# Patient Record
Sex: FEMALE | Race: WHITE | Employment: PART TIME | URBAN - METROPOLITAN AREA
[De-identification: names, ages, dates, MRNs, and addresses within clinical notes are randomized per-mention and may not be internally consistent; named-entity substitution may affect disease eponyms.]

---

## 2017-04-14 ENCOUNTER — HOSPITAL ENCOUNTER (EMERGENCY)
Age: 22
Discharge: HOME | End: 2017-04-14
Attending: EMERGENCY MEDICINE
Payer: COMMERCIAL

## 2017-04-14 VITALS
HEART RATE: 80 BPM | RESPIRATION RATE: 18 BRPM | OXYGEN SATURATION: 100 % | HEIGHT: 58 IN | WEIGHT: 85 LBS | TEMPERATURE: 97.9 F | SYSTOLIC BLOOD PRESSURE: 94 MMHG | BODY MASS INDEX: 17.84 KG/M2 | DIASTOLIC BLOOD PRESSURE: 55 MMHG

## 2017-04-14 DIAGNOSIS — K52.9 GASTROENTERITIS: Primary | ICD-10-CM

## 2017-04-14 LAB
ALBUMIN SERPL-MCNC: 5.1 G/DL (ref 3.9–4.9)
ALP BLD-CCNC: 60 U/L (ref 40–130)
ALT SERPL-CCNC: 34 U/L (ref 0–33)
AMPHETAMINE SCREEN, URINE: ABNORMAL
ANION GAP SERPL CALCULATED.3IONS-SCNC: 25 MEQ/L (ref 7–13)
AST SERPL-CCNC: 42 U/L (ref 0–35)
BACTERIA: ABNORMAL /HPF
BARBITURATE SCREEN URINE: ABNORMAL
BASOPHILS ABSOLUTE: 0.1 K/UL (ref 0–0.2)
BASOPHILS RELATIVE PERCENT: 0.6 %
BENZODIAZEPINE SCREEN, URINE: ABNORMAL
BILIRUB SERPL-MCNC: 0.6 MG/DL (ref 0–1.2)
BILIRUBIN URINE: NEGATIVE
BLOOD, URINE: ABNORMAL
BUN BLDV-MCNC: 20 MG/DL (ref 6–20)
CALCIUM SERPL-MCNC: 10.1 MG/DL (ref 8.6–10.2)
CANNABINOID SCREEN URINE: POSITIVE
CHLORIDE BLD-SCNC: 99 MEQ/L (ref 98–107)
CLARITY: CLEAR
CO2: 11 MEQ/L (ref 22–29)
COCAINE METABOLITE SCREEN URINE: ABNORMAL
COLOR: YELLOW
CREAT SERPL-MCNC: 0.68 MG/DL (ref 0.5–0.9)
EOSINOPHILS ABSOLUTE: 0.1 K/UL (ref 0–0.7)
EOSINOPHILS RELATIVE PERCENT: 0.4 %
EPITHELIAL CELLS, UA: ABNORMAL /HPF
GFR AFRICAN AMERICAN: >60
GFR NON-AFRICAN AMERICAN: >60
GLOBULIN: 3 G/DL (ref 2.3–3.5)
GLUCOSE BLD-MCNC: 64 MG/DL (ref 74–109)
GLUCOSE URINE: NEGATIVE MG/DL
HCT VFR BLD CALC: 43.9 % (ref 37–47)
HEMOGLOBIN: 14.7 G/DL (ref 12–16)
KETONES, URINE: >=80 MG/DL
LEUKOCYTE ESTERASE, URINE: NEGATIVE
LYMPHOCYTES ABSOLUTE: 3.1 K/UL (ref 1–4.8)
LYMPHOCYTES RELATIVE PERCENT: 22.9 %
Lab: ABNORMAL
MCH RBC QN AUTO: 30.5 PG (ref 27–31.3)
MCHC RBC AUTO-ENTMCNC: 33.5 % (ref 33–37)
MCV RBC AUTO: 91.1 FL (ref 82–100)
MONOCYTES ABSOLUTE: 0.6 K/UL (ref 0.2–0.8)
MONOCYTES RELATIVE PERCENT: 4.4 %
NEUTROPHILS ABSOLUTE: 9.7 K/UL (ref 1.4–6.5)
NEUTROPHILS RELATIVE PERCENT: 71.7 %
NITRITE, URINE: NEGATIVE
OPIATE SCREEN URINE: ABNORMAL
PDW BLD-RTO: 12.6 % (ref 11.5–14.5)
PH UA: 5.5 (ref 5–9)
PHENCYCLIDINE SCREEN URINE: ABNORMAL
PLATELET # BLD: 177 K/UL (ref 130–400)
POTASSIUM SERPL-SCNC: 4.2 MEQ/L (ref 3.5–5.1)
PROTEIN UA: 30 MG/DL
RBC # BLD: 4.82 M/UL (ref 4.2–5.4)
RBC UA: ABNORMAL /HPF (ref 0–2)
SLIDE REVIEW: ABNORMAL
SODIUM BLD-SCNC: 135 MEQ/L (ref 132–144)
SPECIFIC GRAVITY UA: 1.03 (ref 1–1.03)
TOTAL PROTEIN: 8.1 G/DL (ref 6.4–8.1)
URINE REFLEX TO CULTURE: YES
UROBILINOGEN, URINE: 0.2 E.U./DL
WBC # BLD: 13.5 K/UL (ref 4.5–11)
WBC UA: ABNORMAL /HPF (ref 0–5)

## 2017-04-14 PROCEDURE — 99284 EMERGENCY DEPT VISIT MOD MDM: CPT

## 2017-04-14 PROCEDURE — 6370000000 HC RX 637 (ALT 250 FOR IP): Performed by: EMERGENCY MEDICINE

## 2017-04-14 PROCEDURE — 96374 THER/PROPH/DIAG INJ IV PUSH: CPT

## 2017-04-14 PROCEDURE — 2580000003 HC RX 258: Performed by: EMERGENCY MEDICINE

## 2017-04-14 PROCEDURE — 36415 COLL VENOUS BLD VENIPUNCTURE: CPT

## 2017-04-14 PROCEDURE — 6360000002 HC RX W HCPCS: Performed by: EMERGENCY MEDICINE

## 2017-04-14 PROCEDURE — 81001 URINALYSIS AUTO W/SCOPE: CPT

## 2017-04-14 PROCEDURE — 87086 URINE CULTURE/COLONY COUNT: CPT

## 2017-04-14 PROCEDURE — 80053 COMPREHEN METABOLIC PANEL: CPT

## 2017-04-14 PROCEDURE — 96375 TX/PRO/DX INJ NEW DRUG ADDON: CPT

## 2017-04-14 PROCEDURE — 80307 DRUG TEST PRSMV CHEM ANLYZR: CPT

## 2017-04-14 PROCEDURE — 85025 COMPLETE CBC W/AUTO DIFF WBC: CPT

## 2017-04-14 PROCEDURE — 96372 THER/PROPH/DIAG INJ SC/IM: CPT

## 2017-04-14 RX ORDER — ONDANSETRON 2 MG/ML
4 INJECTION INTRAMUSCULAR; INTRAVENOUS ONCE
Status: COMPLETED | OUTPATIENT
Start: 2017-04-14 | End: 2017-04-14

## 2017-04-14 RX ORDER — 0.9 % SODIUM CHLORIDE 0.9 %
2000 INTRAVENOUS SOLUTION INTRAVENOUS ONCE
Status: COMPLETED | OUTPATIENT
Start: 2017-04-14 | End: 2017-04-14

## 2017-04-14 RX ORDER — DIPHENOXYLATE HYDROCHLORIDE AND ATROPINE SULFATE 2.5; .025 MG/1; MG/1
2 TABLET ORAL ONCE
Status: COMPLETED | OUTPATIENT
Start: 2017-04-14 | End: 2017-04-14

## 2017-04-14 RX ORDER — DIPHENOXYLATE HYDROCHLORIDE AND ATROPINE SULFATE 2.5; .025 MG/1; MG/1
1 TABLET ORAL 4 TIMES DAILY PRN
Qty: 20 TABLET | Refills: 0 | Status: SHIPPED | OUTPATIENT
Start: 2017-04-14 | End: 2017-04-24

## 2017-04-14 RX ORDER — DICYCLOMINE HYDROCHLORIDE 10 MG/ML
20 INJECTION INTRAMUSCULAR ONCE
Status: COMPLETED | OUTPATIENT
Start: 2017-04-14 | End: 2017-04-14

## 2017-04-14 RX ORDER — DEXTROSE MONOHYDRATE 25 G/50ML
25 INJECTION, SOLUTION INTRAVENOUS ONCE
Status: COMPLETED | OUTPATIENT
Start: 2017-04-14 | End: 2017-04-14

## 2017-04-14 RX ORDER — ONDANSETRON 4 MG/1
4 TABLET, FILM COATED ORAL EVERY 8 HOURS PRN
Qty: 20 TABLET | Refills: 0 | Status: SHIPPED | OUTPATIENT
Start: 2017-04-14 | End: 2017-05-05

## 2017-04-14 RX ADMIN — ONDANSETRON 4 MG: 2 INJECTION, SOLUTION INTRAMUSCULAR; INTRAVENOUS at 10:50

## 2017-04-14 RX ADMIN — DIPHENOXYLATE HYDROCHLORIDE AND ATROPINE SULFATE 2 TABLET: 2.5; .025 TABLET ORAL at 11:18

## 2017-04-14 RX ADMIN — DICYCLOMINE HYDROCHLORIDE 20 MG: 20 INJECTION, SOLUTION INTRAMUSCULAR at 10:48

## 2017-04-14 RX ADMIN — DEXTROSE MONOHYDRATE 25 G: 500 INJECTION PARENTERAL at 12:51

## 2017-04-14 RX ADMIN — SODIUM CHLORIDE 2000 ML: 900 INJECTION, SOLUTION INTRAVENOUS at 10:50

## 2017-04-14 ASSESSMENT — ENCOUNTER SYMPTOMS
EYE REDNESS: 0
NAUSEA: 1
CONSTIPATION: 0
FACIAL SWELLING: 0
STRIDOR: 0
SHORTNESS OF BREATH: 0
BACK PAIN: 0
ANAL BLEEDING: 0
EYE ITCHING: 0
EYE PAIN: 0
COLOR CHANGE: 0
CHOKING: 0
ABDOMINAL DISTENTION: 0
TROUBLE SWALLOWING: 0
RHINORRHEA: 0
SORE THROAT: 0
CHEST TIGHTNESS: 0
ABDOMINAL PAIN: 0
PHOTOPHOBIA: 0
DIARRHEA: 1
VOMITING: 1
EYE DISCHARGE: 0
WHEEZING: 0
COUGH: 0
VOICE CHANGE: 0
BLOOD IN STOOL: 0
SINUS PRESSURE: 0

## 2017-04-15 LAB — URINE CULTURE, ROUTINE: NORMAL

## 2017-05-05 ENCOUNTER — OFFICE VISIT (OUTPATIENT)
Dept: PODIATRY | Age: 22
End: 2017-05-05

## 2017-05-05 VITALS
TEMPERATURE: 98.2 F | DIASTOLIC BLOOD PRESSURE: 70 MMHG | OXYGEN SATURATION: 99 % | HEART RATE: 71 BPM | HEIGHT: 58 IN | WEIGHT: 83.4 LBS | SYSTOLIC BLOOD PRESSURE: 106 MMHG | BODY MASS INDEX: 17.51 KG/M2

## 2017-05-05 DIAGNOSIS — L03.039 ONYCHIA OF TOE, UNSPECIFIED LATERALITY: Primary | ICD-10-CM

## 2017-05-05 PROCEDURE — 99213 OFFICE O/P EST LOW 20 MIN: CPT | Performed by: PODIATRIST

## 2021-07-21 ENCOUNTER — HOSPITAL ENCOUNTER (INPATIENT)
Age: 26
LOS: 5 days | Discharge: HOME | DRG: 885 | End: 2021-07-26
Attending: PSYCHIATRY & NEUROLOGY | Admitting: PSYCHIATRY & NEUROLOGY
Payer: COMMERCIAL

## 2021-07-21 DIAGNOSIS — F33.2 SEVERE EPISODE OF RECURRENT MAJOR DEPRESSIVE DISORDER, WITHOUT PSYCHOTIC FEATURES (HCC): Primary | ICD-10-CM

## 2021-07-21 PROBLEM — F33.9 MAJOR DEPRESSION, RECURRENT (HCC): Status: ACTIVE | Noted: 2021-07-21

## 2021-07-21 LAB
ACETAMINOPHEN LEVEL: <5 UG/ML (ref 10–30)
ALBUMIN SERPL-MCNC: 4.6 G/DL (ref 3.5–4.6)
ALP BLD-CCNC: 59 U/L (ref 40–130)
ALT SERPL-CCNC: 11 U/L (ref 0–33)
AMPHETAMINE SCREEN, URINE: ABNORMAL
ANION GAP SERPL CALCULATED.3IONS-SCNC: 13 MEQ/L (ref 9–15)
AST SERPL-CCNC: 14 U/L (ref 0–35)
BACTERIA: NEGATIVE /HPF
BARBITURATE SCREEN URINE: ABNORMAL
BASOPHILS ABSOLUTE: 0 K/UL (ref 0–0.2)
BASOPHILS RELATIVE PERCENT: 0.5 %
BENZODIAZEPINE SCREEN, URINE: ABNORMAL
BILIRUB SERPL-MCNC: 0.3 MG/DL (ref 0.2–0.7)
BILIRUBIN URINE: NEGATIVE
BLOOD, URINE: NEGATIVE
BUN BLDV-MCNC: 9 MG/DL (ref 6–20)
CALCIUM SERPL-MCNC: 9.3 MG/DL (ref 8.5–9.9)
CANNABINOID SCREEN URINE: POSITIVE
CHLORIDE BLD-SCNC: 105 MEQ/L (ref 95–107)
CHOLESTEROL, TOTAL: 149 MG/DL (ref 0–199)
CLARITY: CLEAR
CO2: 22 MEQ/L (ref 20–31)
COCAINE METABOLITE SCREEN URINE: ABNORMAL
COLOR: YELLOW
CREAT SERPL-MCNC: 0.47 MG/DL (ref 0.5–0.9)
EOSINOPHILS ABSOLUTE: 0 K/UL (ref 0–0.7)
EOSINOPHILS RELATIVE PERCENT: 0.1 %
EPITHELIAL CELLS, UA: ABNORMAL /HPF (ref 0–5)
ETHANOL PERCENT: NORMAL G/DL
ETHANOL: <10 MG/DL (ref 0–0.08)
GFR AFRICAN AMERICAN: >60
GFR NON-AFRICAN AMERICAN: >60
GLOBULIN: 2.4 G/DL (ref 2.3–3.5)
GLUCOSE BLD-MCNC: 109 MG/DL (ref 70–99)
GLUCOSE URINE: NEGATIVE MG/DL
HCG(URINE) PREGNANCY TEST: NEGATIVE
HCT VFR BLD CALC: 39 % (ref 37–47)
HDLC SERPL-MCNC: 61 MG/DL (ref 40–59)
HEMOGLOBIN: 12.8 G/DL (ref 12–16)
HYALINE CASTS: ABNORMAL /HPF (ref 0–5)
KETONES, URINE: NEGATIVE MG/DL
LDL CHOLESTEROL CALCULATED: 81 MG/DL (ref 0–129)
LEUKOCYTE ESTERASE, URINE: ABNORMAL
LYMPHOCYTES ABSOLUTE: 1.3 K/UL (ref 1–4.8)
LYMPHOCYTES RELATIVE PERCENT: 17.9 %
Lab: ABNORMAL
MCH RBC QN AUTO: 30.5 PG (ref 27–31.3)
MCHC RBC AUTO-ENTMCNC: 32.9 % (ref 33–37)
MCV RBC AUTO: 92.5 FL (ref 82–100)
METHADONE SCREEN, URINE: ABNORMAL
MONOCYTES ABSOLUTE: 0.5 K/UL (ref 0.2–0.8)
MONOCYTES RELATIVE PERCENT: 6.4 %
NEUTROPHILS ABSOLUTE: 5.5 K/UL (ref 1.4–6.5)
NEUTROPHILS RELATIVE PERCENT: 75.1 %
NITRITE, URINE: NEGATIVE
OPIATE SCREEN URINE: ABNORMAL
OXYCODONE URINE: ABNORMAL
PDW BLD-RTO: 13.3 % (ref 11.5–14.5)
PH UA: 5.5 (ref 5–9)
PHENCYCLIDINE SCREEN URINE: ABNORMAL
PLATELET # BLD: 244 K/UL (ref 130–400)
POTASSIUM SERPL-SCNC: 4.5 MEQ/L (ref 3.4–4.9)
PROPOXYPHENE SCREEN: ABNORMAL
PROTEIN UA: NEGATIVE MG/DL
RBC # BLD: 4.22 M/UL (ref 4.2–5.4)
RBC UA: ABNORMAL /HPF (ref 0–5)
SALICYLATE, SERUM: <0.3 MG/DL (ref 15–30)
SARS-COV-2, NAAT: NOT DETECTED
SODIUM BLD-SCNC: 140 MEQ/L (ref 135–144)
SPECIFIC GRAVITY UA: 1.02 (ref 1–1.03)
TOTAL CK: 48 U/L (ref 0–170)
TOTAL PROTEIN: 7 G/DL (ref 6.3–8)
TRIGL SERPL-MCNC: 35 MG/DL (ref 0–150)
TSH SERPL DL<=0.05 MIU/L-ACNC: 1.17 UIU/ML (ref 0.44–3.86)
URINE REFLEX TO CULTURE: YES
UROBILINOGEN, URINE: 0.2 E.U./DL
WBC # BLD: 7.3 K/UL (ref 4.8–10.8)
WBC UA: ABNORMAL /HPF (ref 0–5)

## 2021-07-21 PROCEDURE — 81001 URINALYSIS AUTO W/SCOPE: CPT

## 2021-07-21 PROCEDURE — 80307 DRUG TEST PRSMV CHEM ANLYZR: CPT

## 2021-07-21 PROCEDURE — 80053 COMPREHEN METABOLIC PANEL: CPT

## 2021-07-21 PROCEDURE — 84703 CHORIONIC GONADOTROPIN ASSAY: CPT

## 2021-07-21 PROCEDURE — 87086 URINE CULTURE/COLONY COUNT: CPT

## 2021-07-21 PROCEDURE — 80143 DRUG ASSAY ACETAMINOPHEN: CPT

## 2021-07-21 PROCEDURE — 82550 ASSAY OF CK (CPK): CPT

## 2021-07-21 PROCEDURE — 80179 DRUG ASSAY SALICYLATE: CPT

## 2021-07-21 PROCEDURE — 36415 COLL VENOUS BLD VENIPUNCTURE: CPT

## 2021-07-21 PROCEDURE — 85025 COMPLETE CBC W/AUTO DIFF WBC: CPT

## 2021-07-21 PROCEDURE — 82077 ASSAY SPEC XCP UR&BREATH IA: CPT

## 2021-07-21 PROCEDURE — 93005 ELECTROCARDIOGRAM TRACING: CPT | Performed by: PSYCHIATRY & NEUROLOGY

## 2021-07-21 PROCEDURE — 80061 LIPID PANEL: CPT

## 2021-07-21 PROCEDURE — 99284 EMERGENCY DEPT VISIT MOD MDM: CPT

## 2021-07-21 PROCEDURE — 84443 ASSAY THYROID STIM HORMONE: CPT

## 2021-07-21 PROCEDURE — 1240000000 HC EMOTIONAL WELLNESS R&B

## 2021-07-21 PROCEDURE — 87635 SARS-COV-2 COVID-19 AMP PRB: CPT

## 2021-07-21 RX ORDER — HALOPERIDOL 5 MG
5 TABLET ORAL EVERY 6 HOURS PRN
Status: DISCONTINUED | OUTPATIENT
Start: 2021-07-21 | End: 2021-07-26 | Stop reason: HOSPADM

## 2021-07-21 RX ORDER — TRAZODONE HYDROCHLORIDE 100 MG/1
100 TABLET ORAL NIGHTLY
Status: ON HOLD | COMMUNITY
End: 2021-07-26 | Stop reason: HOSPADM

## 2021-07-21 RX ORDER — HYDROXYZINE HYDROCHLORIDE 50 MG/ML
50 INJECTION, SOLUTION INTRAMUSCULAR EVERY 6 HOURS PRN
Status: DISCONTINUED | OUTPATIENT
Start: 2021-07-21 | End: 2021-07-26 | Stop reason: HOSPADM

## 2021-07-21 RX ORDER — TRAZODONE HYDROCHLORIDE 50 MG/1
50 TABLET ORAL NIGHTLY PRN
Status: DISCONTINUED | OUTPATIENT
Start: 2021-07-22 | End: 2021-07-26 | Stop reason: HOSPADM

## 2021-07-21 RX ORDER — HALOPERIDOL 5 MG/ML
5 INJECTION INTRAMUSCULAR EVERY 6 HOURS PRN
Status: DISCONTINUED | OUTPATIENT
Start: 2021-07-21 | End: 2021-07-26 | Stop reason: HOSPADM

## 2021-07-21 RX ORDER — GABAPENTIN 100 MG/1
200 CAPSULE ORAL 2 TIMES DAILY
Status: ON HOLD | COMMUNITY
End: 2021-07-26 | Stop reason: HOSPADM

## 2021-07-21 RX ORDER — BENZTROPINE MESYLATE 1 MG/ML
2 INJECTION INTRAMUSCULAR; INTRAVENOUS 2 TIMES DAILY PRN
Status: DISCONTINUED | OUTPATIENT
Start: 2021-07-21 | End: 2021-07-26 | Stop reason: HOSPADM

## 2021-07-21 RX ORDER — ACETAMINOPHEN 325 MG/1
650 TABLET ORAL EVERY 4 HOURS PRN
Status: DISCONTINUED | OUTPATIENT
Start: 2021-07-21 | End: 2021-07-26 | Stop reason: HOSPADM

## 2021-07-21 RX ORDER — MAGNESIUM HYDROXIDE/ALUMINUM HYDROXICE/SIMETHICONE 120; 1200; 1200 MG/30ML; MG/30ML; MG/30ML
30 SUSPENSION ORAL PRN
Status: DISCONTINUED | OUTPATIENT
Start: 2021-07-21 | End: 2021-07-26 | Stop reason: HOSPADM

## 2021-07-21 RX ORDER — HYDROXYZINE PAMOATE 50 MG/1
50 CAPSULE ORAL EVERY 6 HOURS PRN
Status: DISCONTINUED | OUTPATIENT
Start: 2021-07-21 | End: 2021-07-26 | Stop reason: HOSPADM

## 2021-07-21 ASSESSMENT — SLEEP AND FATIGUE QUESTIONNAIRES
DIFFICULTY STAYING ASLEEP: YES
DIFFICULTY FALLING ASLEEP: YES
RESTFUL SLEEP: NO
DO YOU USE A SLEEP AID: NO
AVERAGE NUMBER OF SLEEP HOURS: 4
DO YOU HAVE DIFFICULTY SLEEPING: YES
SLEEP PATTERN: DIFFICULTY FALLING ASLEEP
DIFFICULTY ARISING: YES

## 2021-07-21 ASSESSMENT — PATIENT HEALTH QUESTIONNAIRE - PHQ9: SUM OF ALL RESPONSES TO PHQ QUESTIONS 1-9: 9

## 2021-07-21 ASSESSMENT — ENCOUNTER SYMPTOMS
TROUBLE SWALLOWING: 0
ALLERGIC/IMMUNOLOGIC NEGATIVE: 1
EYE PAIN: 0
ABDOMINAL PAIN: 0
COLOR CHANGE: 0
APNEA: 0
SHORTNESS OF BREATH: 0

## 2021-07-21 NOTE — ED NOTES
Provisional Diagnosis:Bipolar disorder    psychosocial and Contextual Factors:  Pt lives alone. She reports that her  has been unfatihtful to her and she wants to end there marriage. She reports that she feels abandoned by a friend who told her mother some information about her personal life in confidence and now feels betrayed. Pt is not currently employed and lives with  and 9year old son. C-SSRS Summary:     Patient: C-SSRS Suicide Screening  1) Within the past month, have you wished you were dead or wished you could go to sleep and not wake up? : Yes  2) Have you actually had any thoughts of killing yourself? : Yes  3) Have you been thinking about how you might kill yourself? : No  4) Have you had these thoughts and had some intention of acting on them? : No (\" I don't think so\")  5) Have you started to work out or worked out the details of how to kill yourself? Do you intend to carry out this plan? : No  6) Have you ever done anything, started to do anything, or prepared to do anything to end your life?: Yes  Did this occur within the past 3 months? : No (May [address]21 was going to hang self, 2013 tried to [facility] on medication )    Family: amily: This writer spoke with patient sister Dae Mtz [phone]. She reports that she is aware her sister has been feeling ' stressed\" but she does not feel she is a danger to self however this writer after found that the pateitnts sister Dae Mtz is only 16 and not aware of full situation    Agency: [facility] , Pt has also  [facility] and this writer received a call from her [facility] states that the patient has been making multiple suicidal statements to her  over the last week.  That when  woke up this morning that she was gone and that when she was found found [place] she has threatened to jump  in the past. Quita Martell states she feels she is a strong risk for suicide and

## 2021-07-21 NOTE — ED NOTES
Pt asked for paper and pencil and is using the phone in the [address] [place] [address] to call family, she is quiet and cooperative with no problems and no C/O any kind expressed.      Toni Corea RN  07/21/21 7764

## 2021-07-21 NOTE — ED NOTES
Janet De Paz PA-C is here assessing the pt medically.   Called lab talked with Beena Martinez in the lab who states he will send lab to the [facility] to draw the pt's blood work     Dandre Marques RN  07/21/21 2398

## 2021-07-21 NOTE — ED NOTES
Pt is quiet and cooperative awake in the -ER and has no problems and no C/O any kind expressed.      Efrain Ang RN  07/21/21 2669

## 2021-07-21 NOTE — ED PROVIDER NOTES
[address] [facility] ED  EMERGENCY DEPARTMENT ENCOUNTER      Pt Name: Анна Correia  MRN: [id number]  [place] 1995  Date of evaluation: 7/21/2021  Provider: Quincy Cordova PA-C    CHIEF COMPLAINT       Chief Complaint   Patient presents with    Suicidal         HISTORY OF PRESENT ILLNESS   (Location/Symptom, Timing/Onset, Context/Setting, Quality, Duration, Modifying Factors, Severity)  Note limiting factors. Анна Correia is a 26 y.o. female who presents to the emergency department with complaints of depression with indirect suicidal thoughts. Patient states \"not technically\" when asked, but states \"I just wanted all to and\". Patient denies any homicidal ideation, auditory visual hallucinations. Patient states generalized fatigue but denies any recent illness or injury otherwise  HPI    Nursing Notes were reviewed. REVIEW OF SYSTEMS    (2-9 systems for level 4, 10 or more for level 5)     Review of Systems   Constitutional: Negative for diaphoresis and fever. HENT: Negative for hearing loss and trouble swallowing. Eyes: Negative for pain. Respiratory: Negative for apnea and shortness of breath. Cardiovascular: Negative for chest pain. Gastrointestinal: Negative for abdominal pain. Endocrine: Negative. Genitourinary: Negative for hematuria. Musculoskeletal: Negative for neck pain and neck stiffness. Skin: Negative for color change. Allergic/Immunologic: Negative. Neurological: Negative for dizziness and numbness. Hematological: Negative. Psychiatric/Behavioral: Negative. All other systems reviewed and are negative. Except as noted above the remainder of the review of systems was reviewed and negative. PAST MEDICAL HISTORY   No past medical history on file. SURGICAL HISTORY     No past surgical history on file. CURRENT MEDICATIONS       Previous Medications    GABAPENTIN (NEURONTIN) 100 MG CAPSULE    Take 200 mg by mouth 2 times daily.     TRAZODONE (DESYREL) 100 MG TABLET    Take 100 mg by mouth nightly    VORTIOXETINE (TRINTELLIX) 5 MG TABLET    Take 5 mg by mouth daily       ALLERGIES     Adhesive tape    FAMILY HISTORY     No family history on file. SOCIAL HISTORY       Social History     Socioeconomic History    Marital status: Single     Spouse name: Not on file    Number of children: Not on file    Years of education: Not on file    Highest education level: Not on file   Occupational History    Not on file   Tobacco Use    Smoking status: Not on file   Substance and Sexual Activity    Alcohol use: Not on file    Drug use: Not on file    Sexual activity: Not on file   Other Topics Concern    Not on file   Social History Narrative    Not on file     Social Determinants of Health     Financial Resource Strain:     Difficulty of Paying Living Expenses:    Food Insecurity:     Worried About Running Out of Food in the Last Year:     [address] in the Last Year:    Transportation Needs:     Lack of Transportation (Medical):  Lack of Transportation (Non-Medical):    Physical Activity:     Days of Exercise per Week:     Minutes of Exercise per Session:    Stress:     Feeling of Stress :    Social Connections:     Frequency of Communication with Friends and Family:     Frequency of Social Gatherings with Friends and Family:     Attends Islam Services:     Active Member of Clubs or Organizations:     Attends Club or Organization Meetings:     Marital Status:    Intimate Partner Violence:     Fear of Current or Ex-Partner:     Emotionally Abused:     Physically Abused:     Sexually Abused:        SCREENINGS                        PHYSICAL EXAM    (up to 7 for level 4, 8 or more for level 5)     ED Triage Vitals [07/21/21 1446]   BP Temp Temp Source Pulse Resp SpO2 Height Weight   114/77 97 °F (36.1 °C) Temporal 88 18 99 % 4' 10\" (1.473 m) 90 lb (40.8 kg)       Physical Exam  Vitals and nursing note reviewed. Constitutional:       General: She is not in acute distress. Appearance: She is well-developed. She is not diaphoretic. HENT:      Head: Normocephalic and atraumatic. Mouth/Throat:      Pharynx: No oropharyngeal exudate. Eyes:      General: No scleral icterus. Conjunctiva/sclera: Conjunctivae normal.      Pupils: Pupils are equal, round, and reactive to light. Neck:      Trachea: No tracheal deviation. Cardiovascular:      Rate and Rhythm: Normal rate. Heart sounds: Normal heart sounds. Pulmonary:      Effort: Pulmonary effort is normal. No respiratory distress. Breath sounds: Normal breath sounds. Abdominal:      General: Bowel sounds are normal. There is no distension. Palpations: Abdomen is soft. Musculoskeletal:         General: Normal range of motion. Cervical back: Normal range of motion and neck supple. Skin:     General: Skin is warm and dry. Findings: No erythema or rash. Neurological:      Mental Status: She is alert and oriented to person, place, and time. Cranial Nerves: No cranial nerve deficit. Motor: No abnormal muscle tone. Psychiatric:         Mood and Affect: Mood is depressed. Behavior: Behavior is withdrawn.          DIAGNOSTIC RESULTS     EKG: All EKG's are interpreted by the Emergency Department Physician who either signs or Co-signs this chart in the absence of a cardiologist.    Normal sinus rhythm, rate 81 bpm, no acute ST elevation    RADIOLOGY:   Non-plain film images such as CT, Ultrasound and MRI are read by the radiologist. Plain radiographic images are visualized and preliminarily interpreted by the emergency physician with the below findings:        Interpretation per the Radiologist below, if available at the time of this note:    No orders to display         ED BEDSIDE ULTRASOUND:   Performed by ED Physician - none    LABS:  Labs Reviewed   CBC WITH AUTO DIFFERENTIAL - Abnormal; Notable for the following components:       Result Value    MCHC 32.9 (*)     All other components within normal limits   COMPREHENSIVE METABOLIC PANEL - Abnormal; Notable for the following components:    Glucose 109 (*)     CREATININE 0.47 (*)     All other components within normal limits   URINE DRUG SCREEN - Abnormal; Notable for the following components:    Cannabinoid Scrn, Ur POSITIVE (*)     All other components within normal limits   ACETAMINOPHEN LEVEL - Abnormal; Notable for the following components:    Acetaminophen Level <5 (*)     All other components within normal limits   LIPID PANEL - Abnormal; Notable for the following components:    HDL 61 (*)     All other components within normal limits   SALICYLATE LEVEL - Abnormal; Notable for the following components:    Salicylate, Serum <6.5 (*)     All other components within normal limits   URINE RT REFLEX TO CULTURE - Abnormal; Notable for the following components:    Leukocyte Esterase, Urine MODERATE (*)     All other components within normal limits   MICROSCOPIC URINALYSIS - Abnormal; Notable for the following components:    WBC, UA 20-50 (*)     RBC, UA 3-5 (*)     All other components within normal limits   COVID-19, RAPID   CULTURE, URINE   ETHANOL   CK   TSH WITHOUT REFLEX   PREGNANCY, URINE       All other labs were within normal range or not returned as of this dictation. EMERGENCY DEPARTMENT COURSE and DIFFERENTIAL DIAGNOSIS/MDM:   Vitals:    Vitals:    07/21/21 1446   BP: 114/77   Pulse: 88   Resp: 18   Temp: 97 °F (36.1 °C)   TempSrc: Temporal   SpO2: 99%   Weight: 90 lb (40.8 kg)   Height: 4' 10\" (1.473 m)           MDM      REASSESSMENT      Patient is medically cleared for psychiatric evaluation and care    CONSULTS:  IP CONSULT TO HOSPITALIST  IP CONSULT TO SOCIAL WORK    PROCEDURES:  Unless otherwise noted below, none     Procedures    She was seen, evaluated and admitted by psychiatry    FINAL IMPRESSION      1.  Severe episode of recurrent major depressive disorder, without psychotic features ([facility] [address]          DISPOSITION/PLAN   DISPOSITION        PATIENT REFERRED TO:  No follow-up provider specified. DISCHARGE MEDICATIONS:  New Prescriptions    No medications on file     Controlled Substances Monitoring:     No flowsheet data found.     (Please note that portions of this note were completed with a voice recognition program.  Efforts were made to edit the dictations but occasionally words are mis-transcribed.)    Shaye Delarosa PA-C (electronically signed)  Attending Emergency Physician            Shaye Delarosa PA-C  07/21/21 2041

## 2021-07-21 NOTE — ED NOTES
Re contacted [facility]. Judith is sending med list. She states her last fefill  would have been in April and that her appearances for appointments have been sporadic . Pateints prescriber Rayna Rizo CNP. [facility]  07/21/21 8143

## 2021-07-21 NOTE — ED NOTES
Skin and clothing  check were completed. Pts belongings were cataloged by security and secured in locker. Lindy Brumfield  [place], [address] [place] [address]  07/21/21 4810

## 2021-07-21 NOTE — ED NOTES
Lab is here to draw the pt's blood work.   - staff are present in the [address] the      Leslie Ivan RN  07/21/21 8804

## 2021-07-22 LAB
EKG ATRIAL RATE: 81 BPM
EKG P AXIS: 51 DEGREES
EKG P-R INTERVAL: 130 MS
EKG Q-T INTERVAL: 352 MS
EKG QRS DURATION: 86 MS
EKG QTC CALCULATION (BAZETT): 408 MS
EKG R AXIS: 76 DEGREES
EKG T AXIS: 53 DEGREES
EKG VENTRICULAR RATE: 81 BPM

## 2021-07-22 PROCEDURE — 99222 1ST HOSP IP/OBS MODERATE 55: CPT | Performed by: PSYCHIATRY & NEUROLOGY

## 2021-07-22 PROCEDURE — 1240000000 HC EMOTIONAL WELLNESS R&B

## 2021-07-22 PROCEDURE — 6370000000 HC RX 637 (ALT 250 FOR IP): Performed by: PSYCHIATRY & NEUROLOGY

## 2021-07-22 PROCEDURE — 93010 ELECTROCARDIOGRAM REPORT: CPT | Performed by: INTERNAL MEDICINE

## 2021-07-22 RX ORDER — LAMOTRIGINE 25 MG/1
50 TABLET ORAL DAILY
Status: DISCONTINUED | OUTPATIENT
Start: 2021-07-22 | End: 2021-07-26 | Stop reason: HOSPADM

## 2021-07-22 RX ORDER — ACETAMINOPHEN 80 MG
TABLET,CHEWABLE ORAL ONCE
Status: DISCONTINUED | OUTPATIENT
Start: 2021-07-22 | End: 2021-07-26 | Stop reason: HOSPADM

## 2021-07-22 RX ORDER — PROPRANOLOL HYDROCHLORIDE 10 MG/1
5 TABLET ORAL 3 TIMES DAILY PRN
Status: DISCONTINUED | OUTPATIENT
Start: 2021-07-22 | End: 2021-07-26 | Stop reason: HOSPADM

## 2021-07-22 RX ADMIN — LAMOTRIGINE 50 MG: 25 TABLET ORAL at 14:14

## 2021-07-22 RX ADMIN — TRAZODONE HYDROCHLORIDE 50 MG: 50 TABLET ORAL at 22:06

## 2021-07-22 ASSESSMENT — LIFESTYLE VARIABLES: HISTORY_ALCOHOL_USE: NO

## 2021-07-22 NOTE — GROUP NOTE
Group Therapy Note    Date: 7/22/2021    Group Start Time: 1000  Group End Time: 1100  Group Topic: Psychoeducation    [facility]    Alvin Esquivel, CTRS        Group Therapy Note    Attendees: 6          Patient's Goal:  None      Notes:  Pt. attended the 1000 skill group. Worked independently and was quiet. Pleasant upon approach. Status After Intervention:  Unchanged    Participation Level:  Active Listener and Minimal    Participation Quality: Appropriate and Attentive      Speech:  normal      Thought Process/Content: Logical      Affective Functioning: Flat      Mood: depressed      Level of consciousness:  Alert and Oriented x4      Response to Learning: Progressing to goal      Endings: None Reported    Modes of Intervention: Education, Support, Socialization and Activity      Discipline Responsible: Psychoeducational Specialist      Signature:  Wanda Number
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
## 2021-07-22 NOTE — GROUP NOTE
Group Therapy Note    Date: 7/22/2021    Group Start Time: 0492  Group End Time: 1700  Group Topic: Healthy Living/Wellness    [facility]    Lyn Cr        Group Therapy Note    Attendees: 4/8         Patient's Goal:  To participate in a game to learn about coping skills    Notes:  Patient actively participated in group    Status After Intervention:  Unchanged    Participation Level:  Active Listener and Interactive    Participation Quality: Appropriate, Attentive and Sharing      Speech:  normal      Thought Process/Content: Logical      Affective Functioning: Congruent      Mood: euthymic      Level of consciousness:  Alert and Attentive      Response to Learning: Able to verbalize current knowledge/experience and Progressing to goal      Endings: None Reported    Modes of Intervention: Education      Discipline Responsible: [employer] [address]      Signature:  Lyn Cr
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
## 2021-07-22 NOTE — GROUP NOTE
Group Therapy Note    Date: 7/22/2021    Group Start Time: 8974  Group End Time: 1500  Group Topic: Healthy Living/Wellness    [facility]    Andrew Godoy RN        Group Therapy Note    Attendees:6/7       Patient's Goal:  Socialization, learning coping skills through out recovery. Status After Intervention:  Unchanged    Participation Level:  Active Listener    Participation Quality: Appropriate      Speech:  normal      Thought Process/Content: Logical      Affective Functioning: Congruent      Mood: euthymic      Level of consciousness:  Oriented x4      Response to Learning: Able to verbalize current knowledge/experience      Endings: None Reported    Modes of Intervention: Socialization and Exploration      Discipline Responsible: Registered Nurse      Signature:  Andrew Godoy RN
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
## 2021-07-22 NOTE — CARE COORDINATION
Brief Intervention and Referral to Treatment Summary    Patient was provided PHQ-9, AUDIT and DAST Screening:      PHQ-9 Score: 9  AUDIT Score:  0  DAST Score:  0    Patients substance use is considered     Low Risk/Healthy X   Moderate Risk  Harmful  Dependent    Patients depression is considered:     Minimal  Mild   Moderate X   Moderately Severe  Severe    Brief Education Was Provided n/a     Patient was receptive  Patient was not receptive       Brief Intervention Is Provided (Only for AUDIT or DAST)  N/a     Patient reports readiness to decrease and/or stop use and a plan was discussed   Patient denies readiness to decrease and/or stop use and a plan was not discussed      Recommendations/Referrals for Brief and/or Specialized Treatment Provided to Patient     Pt reports smoking marijuana daily but denies this being an issue. Pt is linked with [facility] for counseling and pharm mangement.  Electronically signed by OLIVIA Rios on 7/22/2021 at 3:51 PM

## 2021-07-22 NOTE — GROUP NOTE
Group Therapy Note    Date: 7/22/2021    Group Start Time: 0200  Group End Time: 0540  Group Topic: Cognitive Skills    [facility]    OLIVIA Crenshaw        Group Therapy Note    Attendees: 6/7         Patient's Goal:  To participate in recreational activities and practice social skills with peers experiencing similar situations. Notes:  N/a    Status After Intervention:  Unchanged    Participation Level:  Active Listener    Participation Quality: Appropriate      Speech:  normal      Thought Process/Content: Logical      Affective Functioning: Flat      Mood: depressed      Level of consciousness:  Alert      Response to Learning: Progressing to goal      Endings: None Reported    Modes of Intervention: Education      Discipline Responsible: /Counselor      Signature:  OLIVIA Crenshaw

## 2021-07-23 LAB — URINE CULTURE, ROUTINE: NORMAL

## 2021-07-23 PROCEDURE — 6370000000 HC RX 637 (ALT 250 FOR IP): Performed by: NURSE PRACTITIONER

## 2021-07-23 PROCEDURE — 99232 SBSQ HOSP IP/OBS MODERATE 35: CPT | Performed by: PSYCHIATRY & NEUROLOGY

## 2021-07-23 PROCEDURE — 1240000000 HC EMOTIONAL WELLNESS R&B

## 2021-07-23 PROCEDURE — 6370000000 HC RX 637 (ALT 250 FOR IP): Performed by: PSYCHIATRY & NEUROLOGY

## 2021-07-23 RX ORDER — IBUPROFEN 600 MG/1
600 TABLET ORAL EVERY 8 HOURS PRN
Status: COMPLETED | OUTPATIENT
Start: 2021-07-23 | End: 2021-07-25

## 2021-07-23 RX ADMIN — PROPRANOLOL HYDROCHLORIDE 5 MG: 10 TABLET ORAL at 17:35

## 2021-07-23 RX ADMIN — ACETAMINOPHEN 650 MG: 325 TABLET ORAL at 12:18

## 2021-07-23 RX ADMIN — LAMOTRIGINE 50 MG: 25 TABLET ORAL at 09:14

## 2021-07-23 RX ADMIN — TRAZODONE HYDROCHLORIDE 50 MG: 50 TABLET ORAL at 22:04

## 2021-07-23 RX ADMIN — IBUPROFEN 600 MG: 600 TABLET ORAL at 23:12

## 2021-07-23 ASSESSMENT — PAIN SCALES - GENERAL
PAINLEVEL_OUTOF10: 9
PAINLEVEL_OUTOF10: 10

## 2021-07-23 NOTE — PROGRESS NOTES
Pt medicated per request with PRN trazodone for sleep. Pt upset after using the phone but preferred not to talk about it. Reassurance provided and pt was informed that staff is available if she were to change her mind about talking.

## 2021-07-23 NOTE — GROUP NOTE
Group Therapy Note    Date: 7/23/2021    Group Start Time: 0678  Group End Time: 3021  Group Topic: Healthy Living/Wellness    [facility]    David Lees        Group Therapy Note    Attendees: 6/11         Patient's Goal:  To participate in a game learning about self-esteem. Notes:  Patient actively participated in group. Patient was pulled out of group by Dr. Padmini Merrill for a bit and returned to group after. Status After Intervention:  Unchanged    Participation Level:  Active Listener and Interactive    Participation Quality: Appropriate and Attentive      Speech:  normal      Thought Process/Content: Logical      Affective Functioning: Congruent      Mood: euthymic      Level of consciousness:  Alert and Attentive      Response to Learning: Progressing to goal      Endings: None Reported    Modes of Intervention: Education      Discipline Responsible: [address] [employer]      Signature:  David Lees

## 2021-07-23 NOTE — GROUP NOTE
Group Therapy Note    Date: 7/23/2021    Group Start Time: 1100  Group End Time: 1150  Group Topic: Psychoeducation    [facility]    KAJAL Arnold LSW        Group Therapy Note    Attendees: 6         Patient's Goal:  To participate in group therapy and to identify a goal    Notes:  Patient's goal is to become independent     Status After Intervention:  Improved    Participation Level: Interactive    Participation Quality: Sharing      Speech:  normal      Thought Process/Content: Logical      Affective Functioning: Congruent      Mood: tearful      Level of consciousness:  Alert      Response to Learning: Able to verbalize current knowledge/experience      Endings: None Reported    Modes of Intervention: Education      Discipline Responsible: /Counselor      Signature:  KAJAL Arnold, MICHELLE

## 2021-07-23 NOTE — GROUP NOTE
Group Therapy Note    Date: 7/23/2021    Group Start Time: 1400  Group End Time: 1500  Group Topic: Relaxation    [facility]    Jesus Brooks RN        Group Therapy Note    Attendees: 5/11         Patient's Goal:  Learn new relaxation technique.     Notes:  Alert and appropriate    Status After Intervention:  Unchanged    Participation Level: Interactive    Participation Quality: Appropriate and Attentive      Speech:  normal      Thought Process/Content: Logical  Linear      Affective Functioning: Congruent      Mood: euthymic      Level of consciousness:  Alert and Oriented x4      Response to Learning: Able to verbalize current knowledge/experience and Able to verbalize/acknowledge new learning      Endings: None Reported    Modes of Intervention: Support, Socialization and Exploration      Discipline Responsible: Registered Nurse      Signature:  Jesus Brooks RN

## 2021-07-23 NOTE — PROGRESS NOTES
Pt. refused to attend the 0900 community meeting. Electronically signed by Shar Craven on 7/23/2021 at 9:27 AM

## 2021-07-23 NOTE — PROGRESS NOTES
BEHAVIORAL HEALTH FOLLOW-UP NOTE     7/23/2021    [x] Patient was seen and examined in person  [x] Chart reviewed  [x] Labs reviewed  [x] Patient's case discussed with staff/team    Chief Complaint: \"anxious\"    Interim History: Patient reports slept ok. Has been attending groups, mood is a 4/10 with 10 being good. Says she has talked to people from home () and says that \"it was alright\". Says she is not having as much suicidal ideation. Appetite:  [x] Normal/Unchanged  [] Increased  [] Decreased    Sleep:   Slept 8 hours last night, feels sleep was has slightly improved. Denies nightmares   Suicidal ideation: [] Present  [x] Absent     Plan []Present  [] Absent  [x]  N/A  Homicidal ideation: []Present  [x] Absent  Energy:    [x] Normal/Unchanged  [] Increased  [] Decreased       Patient is [] able  [x]  unable to CONTRACT FOR SAFETY     Medication side effects(SE):  None     Examination:  /67   Pulse 95   Temp 99 °F (37.2 °C)   Resp 18   Ht 4' 10\" (1.473 m)   Wt 90 lb (40.8 kg)   SpO2 99%   BMI 18.81 kg/m²   Appearance:casual, in hospital attire   alert, cooperative  Speech    spontaneous and normal rate  Mood    \"down, sad\"   Affect    depressed affect  Thought Content    hopelessness, helplessness, worthlessness and excessive guilt denies auditory or visual hallucinations, not responding to internal stimuli    Thought Process    linear and goal directed  Associations    logical connections  Insight    limited  Judgment    Impaired  Orientation    oriented to person, place, time, and general circumstances  Memory    recent and remote memory intact  Attention/Concentration    intact    7/23/2021    PAST MEDICAL/PSYCHIATRIC HISTORY:   No past medical history on file. FAMILY/SOCIAL HISTORY:  No family history on file.   Social History     Socioeconomic History    Marital status: Single     Spouse name: Not on file    Number of children: Not on file    Years of education: Not on file    Highest education level: Not on file   Occupational History    Not on file   Tobacco Use    Smoking status: Not on file   Substance and Sexual Activity    Alcohol use: Not on file    Drug use: Not on file    Sexual activity: Not on file   Other Topics Concern    Not on file   Social History Narrative    Not on file     Social Determinants of Health     Financial Resource Strain:     Difficulty of Paying Living Expenses:    Food Insecurity:     Worried About Running Out of Food in the Last Year:     [address] in the Last Year:    Transportation Needs:     Lack of Transportation (Medical):  Lack of Transportation (Non-Medical):    Physical Activity:     Days of Exercise per Week:     Minutes of Exercise per Session:    Stress:     Feeling of Stress :    Social Connections:     Frequency of Communication with Friends and Family:     Frequency of Social Gatherings with Friends and Family:     Attends Gnosticist Services:     Active Member of Clubs or Organizations:     Attends Club or Organization Meetings:     Marital Status:    Intimate Partner Violence:     Fear of Current or Ex-Partner:     Emotionally Abused:     Physically Abused:     Sexually Abused:      LABS:  Lab Results   Component Value Date     07/21/2021    BUN 9 07/21/2021    CREATININE 0.47 (L) 07/21/2021    TSH 1.170 07/21/2021    WBC 7.3 07/21/2021     No results found for: PHENYTOIN, PHENOBARB, VALPROATE, CBMZ  No results found for: INR, PROTIME  No results found for: APTT  Recent Labs     07/21/21  1503   ETOH <10     [unfilled]  ROS:  [x] All negative/unchanged except if checked.  Explain positive(checked items) below:  [] Constitutional  [] Eyes  [] Ear/Nose/Mouth/Throat  [] Respiratory  [] CV  [] GI  []   [] Musculoskeletal  [] Skin/Breast  [] Neurological  [] Endocrine  [] Heme/Lymph  [] Allergic/Immunologic    Explanation:     MEDICATIONS:    Current Facility-Administered Medications:     lamoTRIgine (LAMICTAL) tablet 50 mg, 50 mg, Oral, Daily, Kenneth Nayak MD, 50 mg at 07/23/21 0914    propranolol (INDERAL) tablet 5 mg, 5 mg, Oral, TID PRN, Kenneth Nayak MD    pill splitter, , Does not apply, Once, Kenneth Nayak MD    acetaminophen (TYLENOL) tablet 650 mg, 650 mg, Oral, Q4H PRN, Chelsea Matamoros MD, 650 mg at 07/23/21 1218    magnesium hydroxide (MILK OF MAGNESIA) 400 MG/5ML suspension 30 mL, 30 mL, Oral, Daily PRN, Chelsea Matamoros MD    aluminum & magnesium hydroxide-simethicone (MAALOX) 200-200-20 MG/5ML suspension 30 mL, 30 mL, Oral, PRN, Chelsea Matamoros MD    haloperidol (HALDOL) tablet 5 mg, 5 mg, Oral, Q6H PRN **OR** haloperidol lactate (HALDOL) injection 5 mg, 5 mg, Intramuscular, Q6H PRN, Chelsea Matamoros MD    benztropine mesylate (COGENTIN) injection 2 mg, 2 mg, Intramuscular, BID PRN, Chelsea Matamoros MD    hydrOXYzine (VISTARIL) injection 50 mg, 50 mg, Intramuscular, Q6H PRN **OR** hydrOXYzine (VISTARIL) capsule 50 mg, 50 mg, Oral, Q6H PRN, Chelsea Matamoros MD    traZODone (DESYREL) tablet 50 mg, 50 mg, Oral, Nightly PRN, Chelsea Matamoros MD, 50 mg at 07/22/21 2206    PSYCHOTHERAPY/COUNSELING:  [x] Therapeutic interview  [x] Supportive  [] CBT  [x] Ongoing  [] Other    ASSESSMENT:    [x] Patient continues to need, on a daily basis, active treatment furnished directly by or requiring the supervision of inpatient psychiatric personnel     Diagnosis:  Active Problems:    Major depression, recurrent ([facility] [address]  Resolved Problems:    * No resolved hospital problems. *  continues to have symptoms of severe depression with less suicidal ideation. Patient is benefitting from group therapy, getting better sleep. Will continue current medications.      Treatment Plan:      DATE and changes made   7/23/21  o Lamotrigine (Lamictal) 50 mg for mood   o Trazodone (Desyrel) 50 mg every night as needed for sleep   o Propranolol hcl (Inderal) 5 mg three times daily as needed for anxiety       [x] Continue Current Medications  [x] Continue Follow-up and coordination of outpatient care in preparation for discharge from the hospital   [x] Continue Labs  [x] Risks, benefits, side effects, drug-to-drug interactions and alternatives to treatment were discussed in my usual manner. Current medications:     Current Facility-Administered Medications:     lamoTRIgine (LAMICTAL) tablet 50 mg, 50 mg, Oral, Daily, Irina Pierre MD, 50 mg at 07/23/21 0914    propranolol (INDERAL) tablet 5 mg, 5 mg, Oral, TID PRN, Irina Pierre MD    pill splitter, , Does not apply, Once, Irina Pierre MD    acetaminophen (TYLENOL) tablet 650 mg, 650 mg, Oral, Q4H PRN, Nena Garcia MD, 650 mg at 07/23/21 1218    magnesium hydroxide (MILK OF MAGNESIA) 400 MG/5ML suspension 30 mL, 30 mL, Oral, Daily PRN, Nena Garcia MD    aluminum & magnesium hydroxide-simethicone (MAALOX) 200-200-20 MG/5ML suspension 30 mL, 30 mL, Oral, PRN, Nena Garcia MD    haloperidol (HALDOL) tablet 5 mg, 5 mg, Oral, Q6H PRN **OR** haloperidol lactate (HALDOL) injection 5 mg, 5 mg, Intramuscular, Q6H PRN, Nena Garcia MD    benztropine mesylate (COGENTIN) injection 2 mg, 2 mg, Intramuscular, BID PRN, Nena Garcia MD    hydrOXYzine (VISTARIL) injection 50 mg, 50 mg, Intramuscular, Q6H PRN **OR** hydrOXYzine (VISTARIL) capsule 50 mg, 50 mg, Oral, Q6H PRN, Nena Garcia MD    traZODone (DESYREL) tablet 50 mg, 50 mg, Oral, Nightly PRN, Nena Garcia MD, 50 mg at 07/22/21 2206    ASSESSMENT/PLAN: NO CHANGES TO MEDICATIONS: Patient doing well on current medication regimen which is being titrated under the supervision of the physician. At this time, while patient continues to exhibit symptoms, we will not change medications and will continue to monitor for continued improvement. No acute concerns voiced.      Reason for more than one antipsychotic:  [x] N/A  [] 3 failed monotherapy(drugs tried):  [] Cross over to a new antipsychotic  [] Taper to monotherapy from polypharmacy  [] Augmentation of Clozapine therapy due to treatment resistance to single therapy      Electronically signed by Rosa Pryor MD on 7/23/2021 at 4:39 PM

## 2021-07-23 NOTE — BH NOTE
Patient described feeling confused. She reports  wants them to stay together and she is unsure what she wants. She denies SI, HI, or hallucinations. C/O menstrual cramps. Isolates to room but did visit with . Medicated with Inderal for anxiety with fair results.

## 2021-07-23 NOTE — BH NOTE
Patient has been on the periphery with minimal peer interaction. She denies SI, HI, or hallucinations.

## 2021-07-23 NOTE — SUICIDE SAFETY PLAN
SAFETY PLAN    A suicide Safety Plan is a document that supports someone when they are having thoughts of suicide. Warning Signs that indicate a suicidal crisis may be developing: What (situations, thoughts, feelings, body sensations, behaviors, etc.) do you experience that lets you know you are beginning to think about suicide? 1. irritable  2. tired  3. Easily angry    Internal Coping Strategies:  What things can I do (relaxation techniques, hobbies, physical activities, etc.) to take my mind off my problems without contacting another person? 1. read  2. color  3. draw    People and social settings that provide distraction: Who can I call or where can I go to distract me? 1. Name: son  Phone: n/a  2. Place: taking son to park  3. Place: taking dog for walk         4. Place: beach, listening to waves crash    People whom I can ask for help: Who can I call when I need help - for example, friends, family, clergy, someone else? 1. Name: bebo              Phone: has #  2. Name: sister, Annabella Gunter Phone: has #  3. Name: [employer] Phone: [phone]    Professionals or [address] [place], [place] [address]vd I can contact during a crisis: Who can I call for help - for example, my doctor, my psychiatrist, my psychologist, a mental health provider, a suicide hotline? 1. Clinician Name: [address] [place] [address]   Phone: [phone]      Clinician Pager or Emergency Contact #: [phone]    2. 911    3. Suicide Prevention Lifeline: 8-840-057-TALK (5388)    4. [address] [place] [address] Emergency Services -  for example, [facility] suicide hotline, [facility] Hotline: 306      Emergency Services Address: [address] [facility]      Emergency Services Phone: [phone]    Making the environment safe: How can I make my environment (house/apartment/living space) safer? For example, can I remove guns, medications, and other items? 1. Keeping active  2.  Take meds/keep appointments

## 2021-07-23 NOTE — GROUP NOTE
Group Therapy Note    Date: 7/22/2021    Group Start Time: 2100  Group End Time: 2118  Group Topic: Wrap-Up    [facility]    Emma Gasca        Group Therapy Note    Attendees: 2/10         Patient's Goal:  \"to get through today\"    Notes:  Patient reported meeting her goal \"fairly well. \" Patient noted something good that happened today was that she wrote some poems and also got started on writing a short story. Status After Intervention:  Unchanged    Participation Level:  Active Listener and Interactive    Participation Quality: Appropriate, Attentive and Sharing      Speech:  normal      Thought Process/Content: Logical      Affective Functioning: Congruent      Mood: euthymic      Level of consciousness:  Alert and Attentive      Response to Learning: Progressing to goal      Endings: None Reported    Modes of Intervention: Support      Discipline Responsible: [address], [employer]      Signature:  Emma Gasca

## 2021-07-23 NOTE — GROUP NOTE
Group Therapy Note    Date: 7/23/2021    Group Start Time: 1000  Group End Time: 1100  Group Topic: Recreational    [facility]    Vikki Peña        Group Therapy Note    Attendees: 6         Patient's Goal:  To go home. Notes:  Pt was attentive    Status After Intervention:  Unchanged    Participation Level: Interactive    Participation Quality: Attentive      Speech:  normal      Thought Process/Content: Logical      Affective Functioning: Congruent      Mood: calm      Level of consciousness:  Alert      Response to Learning: Able to verbalize current knowledge/experience      Endings: None Reported    Modes of Intervention: Activity      Discipline Responsible: MELODIE      Signature:   Lili Yu

## 2021-07-23 NOTE — GROUP NOTE
Group Therapy Note    Date: 7/22/2021    Group Start Time: 1940  Group End Time: 2005  Group Topic: Recreational    [facility]    Candy Bocanegra        Group Therapy Note    Attendees: 4/9         Patient's Goal:  To participate in a game ([employer])    Notes:  Patient actively participated in game. Patient was seen smiling and laughing.     Status After Intervention:  Improved    Participation Level: Interactive    Participation Quality: Appropriate and Attentive      Speech:  normal      Thought Process/Content: Logical      Affective Functioning: Congruent      Mood: euthymic      Level of consciousness:  Alert and Attentive      Response to Learning: Progressing to goal      Endings: None Reported    Modes of Intervention: Activity      Discipline Responsible: [address], [employer]      Signature:  Candy Bocanegra

## 2021-07-23 NOTE — GROUP NOTE
Group Therapy Note    Date: 7/23/2021    Group Start Time: 1300  Group End Time: 1350  Group Topic: Psychoeducation    [facility]    KAJAL Lewis LSW        Group Therapy Note    Attendees:5         Patient's Goal:  To participate in stress management techniques    Notes:  Patient shared stress reduction techniques    Status After Intervention:  Improved    Participation Level: Interactive    Participation Quality: Sharing      Speech:  normal      Thought Process/Content: Logical      Affective Functioning: Congruent      Mood: calm      Level of consciousness:  Alert      Response to Learning: Able to verbalize current knowledge/experience      Endings: None Reported    Modes of Intervention: Education      Discipline Responsible: /Counselor      Signature:  KAJAL Lewis LSW

## 2021-07-24 PROCEDURE — 6370000000 HC RX 637 (ALT 250 FOR IP): Performed by: PSYCHIATRY & NEUROLOGY

## 2021-07-24 PROCEDURE — 6370000000 HC RX 637 (ALT 250 FOR IP): Performed by: NURSE PRACTITIONER

## 2021-07-24 PROCEDURE — 1240000000 HC EMOTIONAL WELLNESS R&B

## 2021-07-24 RX ADMIN — PROPRANOLOL HYDROCHLORIDE 5 MG: 10 TABLET ORAL at 09:53

## 2021-07-24 RX ADMIN — LAMOTRIGINE 50 MG: 25 TABLET ORAL at 09:02

## 2021-07-24 RX ADMIN — IBUPROFEN 600 MG: 600 TABLET ORAL at 13:08

## 2021-07-24 RX ADMIN — TRAZODONE HYDROCHLORIDE 50 MG: 50 TABLET ORAL at 22:42

## 2021-07-24 ASSESSMENT — PAIN SCALES - GENERAL: PAINLEVEL_OUTOF10: 8

## 2021-07-24 NOTE — PROGRESS NOTES
Patient did not attend the 11:00 activity group despite staff encouragement.     Electronically signed by Beth Coronel OT on 7/24/2021 at 12:04 PM

## 2021-07-24 NOTE — PROGRESS NOTES
Pt very emotional this late evening. Pt stating she is having severe menstrual cramps, requesting a heating pad, requesting a muscle relaxer stating she used to be prescribed cyclobenzaprine. Pt states she is passing clots which makes her sad d/t reminding her of when she had a miscarriage. Pt given one on one with this nurse. Pt states she really wants to go home where she can be more comfortable. Pt wants to cuddle her david bear and/or her dog. Hopsitalist notified, new orders received for PRN ibuprofen which pt is accepting of @ [phone]. Informed pt of available PRN vistaril if unable to relax to sleep. Pt noted to be resting in bed with eyes closed, appears to be sleeping approximately 30 minutes later.

## 2021-07-24 NOTE — PROGRESS NOTES
Patient did not attend group despite staff encouragement.   Electronically signed by Anita Ordoñez on 7/23/2021 at 10:15 PM

## 2021-07-24 NOTE — GROUP NOTE
Group Therapy Note    Date: 7/23/2021    Group Start Time: 2120  Group End Time: 2200  Group Topic: Wrap-Up    [facility] BERENICE Montesinos; Nohelia Gonsalves        Group Therapy Note    Attendees: 5/11         Patient's Goal:  \"To get through the day\"    Notes: Patient reported meeting her goal. Patient participated in wrap up discussion, and an activity, but chose not to participate in meditation. Patient noted being happy about finishing a puzzle with peers. Status After Intervention:  Unchanged    Participation Level:  Active Listener and Interactive    Participation Quality: Appropriate and Attentive      Speech:  normal      Thought Process/Content: Logical      Affective Functioning: Congruent      Mood: depressed      Level of consciousness:  Alert and Attentive      Response to Learning: Progressing to goal      Endings: None Reported    Modes of Intervention: Support and Activity      Discipline Responsible: [address], [employer]      Signature:  Santos Montesinos

## 2021-07-24 NOTE — GROUP NOTE
Group Therapy Note    Date: 7/24/2021    Group Start Time: 1600  Group End Time: 1640  Group Topic: Healthy Living/Wellness    [facility]    Julio Pepper RN        Group Therapy Note    Attendees: 5/11         Patient's Goal:  To participate in a game learning about self esteem. Notes: Attentive and appropriate. Status After Intervention:  Improved    Participation Level:  Active Listener and Interactive    Participation Quality: Appropriate, Attentive, Sharing and Supportive      Speech:  normal      Thought Process/Content: Logical      Affective Functioning: Congruent      Mood: euthymic      Level of consciousness:  Alert, Oriented x4 and Attentive      Response to Learning: Progressing to goal      Endings: None Reported    Modes of Intervention: Education, Support, Socialization, Exploration and Activity      Discipline Responsible: Registered Nurse      Signature:  Julio Pepper RN

## 2021-07-24 NOTE — PROGRESS NOTES
Pt did not attend the 0900 morning community meeting despite staff encouragement.     Electronically signed by Eleazar Medrano OT on 7/24/2021 at 9:17 AM

## 2021-07-24 NOTE — GROUP NOTE
Group Therapy Note    Date: 7/24/2021    Group Start Time: 1000  Group End Time: 0680  Group Topic: Psychotherapy    [facility]    MICHELLE Caruso        Group Therapy Note    Attendees: 7/11         Patient's Goal:  \"To get through today\"    Notes:  Pt was supportive and shared when prompted but was tearful and seemed depressed during group.     Status After Intervention:  Unchanged    Participation Level: Interactive    Participation Quality: Appropriate, Attentive, Sharing and Supportive      Speech:  Quiet      Thought Process/Content: Logical      Affective Functioning: Constricted/Restricted      Mood: depressed and labile      Level of consciousness:  Alert, Oriented x4 and Attentive      Response to Learning: Able to verbalize current knowledge/experience, Able to retain information and Capable of insight      Endings: None Reported    Modes of Intervention: Education, Exploration, Problem-solving and Activity      Discipline Responsible: /Counselor      Signature:  MICHELLE Caruso

## 2021-07-24 NOTE — PROGRESS NOTES
BEHAVIORAL HEALTH FOLLOW-UP NOTE     7/24/2021    [x] Patient was seen and examined in person  [x] Chart reviewed  [x] Labs reviewed  [x] Patient's case discussed with staff/team    Chief Complaint: depression, anxiety    Interim History: Patient said she was \"trying to leave her \" who had cheated on her; she caught him sexting with another woman. She said she felt really hurt, because she took care of him a lot (since he has stage ESRD and needed hemodialysis, which she had helped him with at home). She said she currently has her period, and has PTSD/flashbacks because of seeing blood clots which remind her of her miscarriage last year in February. She said she feels she would be better off at home, where she could take hot baths and could cuddle her dog. She was able to sleep OK; her appetite is low, but she said she never had a good appetite though. She denied having auditory hallucinations; said she was 'arguing with herself about things, what she wanted vs. What she should or should not do. She denied visual hallucinations. She denied paranoia or other delusions. She said she does not want to kill herself, but wanted her thoughts to stop so she cut herself on the wrist. She said she tends to calm down her mental pain when she has physical pain.      Appetite:  [x] Normal/Unchanged  [] Increased  [] Decreased    Sleep: Reported to be good     Suicidal ideation: [] Present  [x] Denies (but may be minimizing in order to be discharged)     Plan []Present  [] Absent  [x]  N/A  Homicidal ideation: []Present  [x] Absent  Energy:    [x] Normal/Unchanged  [] Increased  [] Decreased       Patient is [] able  [x]  unable to CONTRACT FOR SAFETY     Medication side effects(SE):  None     Examination:  /68   Pulse 100   Temp 98.1 °F (36.7 °C) (Oral)   Resp 16   Ht 4' 10\" (1.473 m)   Wt 90 lb (40.8 kg)   SpO2 100%   BMI 18.81 kg/m²   Appearance:casual, in hospital attire   alert, cooperative  Speech: Organization Meetings:     Marital Status:    Intimate Partner Violence:     Fear of Current or Ex-Partner:     Emotionally Abused:     Physically Abused:     Sexually Abused:      LABS:  Lab Results   Component Value Date     07/21/2021    BUN 9 07/21/2021    CREATININE 0.47 (L) 07/21/2021    TSH 1.170 07/21/2021    WBC 7.3 07/21/2021     No results found for: PHENYTOIN, PHENOBARB, VALPROATE, CBMZ  No results found for: INR, PROTIME  No results found for: APTT  Recent Labs     07/21/21  1503   ETOH <10       ROS:  [x] All negative/unchanged except if checked.  Explain positive(checked items) below:  [] Constitutional  [] Eyes  [] Ear/Nose/Mouth/Throat  [] Respiratory  [] CV  [] GI  []   [] Musculoskeletal  [] Skin/Breast  [] Neurological  [] Endocrine  [] Heme/Lymph  [] Allergic/Immunologic    Explanation:     MEDICATIONS:    Current Facility-Administered Medications:     ibuprofen (ADVIL;MOTRIN) tablet 600 mg, 600 mg, Oral, Q8H PRN, UZAIR Vasquez - CNP, 600 mg at 07/24/21 1308    lamoTRIgine (LAMICTAL) tablet 50 mg, 50 mg, Oral, Daily, Stacy Apodaca MD, 50 mg at 07/24/21 0902    propranolol (INDERAL) tablet 5 mg, 5 mg, Oral, TID PRN, Stacy Apodaca MD, 5 mg at 07/24/21 7508    pill splitter, , Does not apply, Once, Stacy Apodaca MD    acetaminophen (TYLENOL) tablet 650 mg, 650 mg, Oral, Q4H PRN, Colt Combs MD, 650 mg at 07/23/21 1218    magnesium hydroxide (MILK OF MAGNESIA) 400 MG/5ML suspension 30 mL, 30 mL, Oral, Daily PRN, Colt Combs MD    aluminum & magnesium hydroxide-simethicone (MAALOX) 200-200-20 MG/5ML suspension 30 mL, 30 mL, Oral, PRN, Colt Combs MD    haloperidol (HALDOL) tablet 5 mg, 5 mg, Oral, Q6H PRN **OR** haloperidol lactate (HALDOL) injection 5 mg, 5 mg, Intramuscular, Q6H PRN, Colt Combs MD    benztropine mesylate (COGENTIN) injection 2 mg, 2 mg, Intramuscular, BID PRN, Colt Combs MD    hydrOXYzine (VISTARIL) injection 50 mg, 50 mg, Intramuscular, Q6H PRN **OR** hydrOXYzine (VISTARIL) capsule 50 mg, 50 mg, Oral, Q6H PRN, Anthony Chu MD    traZODone (DESYREL) tablet 50 mg, 50 mg, Oral, Nightly PRN, Anthony Chu MD, 50 mg at 07/23/21 2204    PSYCHOTHERAPY/COUNSELING:  [x] Therapeutic interview  [x] Supportive  [] CBT  [x] Ongoing  [] Other    ASSESSMENT:    [x] Patient continues to need, on a daily basis, active treatment furnished directly by or requiring the supervision of inpatient psychiatric personnel     Diagnosis:  Active Problems:    Major depression, recurrent ([facility] [address]  Resolved Problems:    * No resolved hospital problems. *  continues to have symptoms of depression; currently denies suicidal ideation but may be minimizing since she is preoccupied with discharge. Patient is benefitting from group therapy, getting better sleep. Will continue current medications. Treatment Plan:      DATE and changes made   7/23/21  o Lamotrigine (Lamictal) 50 mg for mood   o Trazodone (Desyrel) 50 mg every night as needed for sleep   o Propranolol hcl (Inderal) 5 mg three times daily as needed for anxiety       [x] Continue Current Medications  [x] Continue Follow-up and coordination of outpatient care in preparation for discharge from the hospital   [x] Continue Labs  [x] Risks, benefits, side effects, drug-to-drug interactions and alternatives to treatment were discussed in my usual manner.     Current medications:     Current Facility-Administered Medications:     ibuprofen (ADVIL;MOTRIN) tablet 600 mg, 600 mg, Oral, Q8H PRN, Sindy Bruce, APRN - CNP, 600 mg at 07/24/21 1308    lamoTRIgine (LAMICTAL) tablet 50 mg, 50 mg, Oral, Daily, Raj Ferrera MD, 50 mg at 07/24/21 0902    propranolol (INDERAL) tablet 5 mg, 5 mg, Oral, TID PRN, Raj Ferrera MD, 5 mg at 07/24/21 8990    pill splitter, , Does not apply, Once, Raj Ferrera MD    acetaminophen (TYLENOL) tablet 650 mg, 650 mg, Oral, Q4H PRN, Shakira Leggett MD, 650 mg at 07/23/21 1218    magnesium hydroxide (MILK OF MAGNESIA) 400 MG/5ML suspension 30 mL, 30 mL, Oral, Daily PRN, Shakira Leggett MD    aluminum & magnesium hydroxide-simethicone (MAALOX) 200-200-20 MG/5ML suspension 30 mL, 30 mL, Oral, PRN, Shakira Leggett MD    haloperidol (HALDOL) tablet 5 mg, 5 mg, Oral, Q6H PRN **OR** haloperidol lactate (HALDOL) injection 5 mg, 5 mg, Intramuscular, Q6H PRN, Shakira Leggett MD    benztropine mesylate (COGENTIN) injection 2 mg, 2 mg, Intramuscular, BID PRN, Shakira Leggett MD    hydrOXYzine (VISTARIL) injection 50 mg, 50 mg, Intramuscular, Q6H PRN **OR** hydrOXYzine (VISTARIL) capsule 50 mg, 50 mg, Oral, Q6H PRN, Shakira Leggett MD    traZODone (DESYREL) tablet 50 mg, 50 mg, Oral, Nightly PRN, Shakira Leggett MD, 50 mg at 07/23/21 5835    ASSESSMENT/PLAN: NO CHANGES TO MEDICATIONS: Patient doing well on current medication regimen which is being titrated under the supervision of the physician. At this time, while patient continues to exhibit symptoms, we will not change medications and will continue to monitor for continued improvement. No acute concerns voiced.      Reason for more than one antipsychotic:  [x] N/A  [] 3 failed monotherapy(drugs tried):  [] Cross over to a new antipsychotic  [] Taper to monotherapy from polypharmacy  [] Augmentation of Clozapine therapy due to treatment resistance to single therapy      Electronically signed by Shakira Leggett MD on 7/24/2021 at 2:48 PM

## 2021-07-24 NOTE — CARE COORDINATION
Family/Support Name: Janine Lechuga #: [phone]  Relationship to Patient:    Placed call to above for collateral information    Response: Message was unable to be left as it stated phone was on another call.  Will need to be tried again later

## 2021-07-24 NOTE — GROUP NOTE
Group Therapy Note    Date: 7/23/2021    Group Start Time: 1945  Group End Time: 2025  Group Topic: Recreational    [facility]    Sarita Tadeo        Group Therapy Note    Attendees: 5/11         Patient's Goal:  To participate in [employer] games. Notes:  Patient actively participated in games. Status After Intervention:  Unchanged    Participation Level:  Active Listener and Interactive    Participation Quality: Appropriate and Attentive      Speech:  normal      Thought Process/Content: Logical      Affective Functioning: Congruent      Mood: euthymic      Level of consciousness:  Alert and Attentive      Response to Learning: Progressing to goal      Endings: None Reported    Modes of Intervention: Activity      Discipline Responsible: [address], [employer]      Signature:  Sarita Tadeo

## 2021-07-25 PROCEDURE — 6370000000 HC RX 637 (ALT 250 FOR IP): Performed by: PSYCHIATRY & NEUROLOGY

## 2021-07-25 PROCEDURE — 1240000000 HC EMOTIONAL WELLNESS R&B

## 2021-07-25 PROCEDURE — 6370000000 HC RX 637 (ALT 250 FOR IP): Performed by: NURSE PRACTITIONER

## 2021-07-25 RX ADMIN — TRAZODONE HYDROCHLORIDE 50 MG: 50 TABLET ORAL at 21:52

## 2021-07-25 RX ADMIN — IBUPROFEN 600 MG: 600 TABLET ORAL at 12:09

## 2021-07-25 RX ADMIN — PROPRANOLOL HYDROCHLORIDE 5 MG: 10 TABLET ORAL at 20:49

## 2021-07-25 RX ADMIN — LAMOTRIGINE 50 MG: 25 TABLET ORAL at 08:10

## 2021-07-25 RX ADMIN — PROPRANOLOL HYDROCHLORIDE 5 MG: 10 TABLET ORAL at 15:46

## 2021-07-25 ASSESSMENT — PAIN SCALES - GENERAL: PAINLEVEL_OUTOF10: 7

## 2021-07-25 NOTE — PROGRESS NOTES
BEHAVIORAL HEALTH FOLLOW-UP NOTE     7/25/2021    [x] Patient was seen and examined in person  [x] Chart reviewed  [x] Labs reviewed  [x] Patient's case discussed with staff/team    Chief Complaint: depression, anxiety    Interim History:   Patient said she is feeling 'better'. She said she slept well and that her appetite was \"normal\", \"I eat like a bird\". She said her mood was [place] lot better\". She said she had talked to her  yesterday and was thinking of reconciling; they were going to try to communicate better and make lists of what each would like the other to act like towards them. She denied suicidal or homicidal ideation. She denied hallucinations, paranoia or other delusions.      Appetite:  [x] Normal/Unchanged  [] Increased  [] Decreased    Sleep: Reported to be good     Suicidal ideation: [] Present  [x] Denies (unclear whether she is minimizing in order to be discharged)     Plan []Present  [] Absent  [x]  N/A  Homicidal ideation: []Present  [x] Absent  Energy:    [x] Normal/Unchanged  [] Increased  [] Decreased       Patient is [x] able  []  unable to CONTRACT FOR SAFETY     Medication side effects(SE):  None     Examination:  /71   Pulse 127   Temp 97.6 °F (36.4 °C)   Resp 14   Ht 4' 10\" (1.473 m)   Wt 90 lb (40.8 kg)   SpO2 100%   BMI 18.81 kg/m²   Appearance:casual, in hospital attire,  alert, cooperative, holding a david bear  Speech: spontaneous, with normal rate  Mood    Stated to be improved   Affect:    brighter  Thought Content   denies hopelessness, helplessness, worthlessness and excessive guilt, not responding to internal stimuli; denies paranoia or other delusions    Perception: denies auditory or visual hallucinations  Thought Process    linear and goal directed  Associations    logical associations  Insight: Limited; she is preoccupied with discharge and possibly minimizes symptoms   Judgment: fair  Orientation    oriented to person, place, time, and general circumstances  Memory    recent and remote memory intact  Attention/Concentration   intact    7/25/2021    PAST MEDICAL/PSYCHIATRIC HISTORY:   No past medical history on file. FAMILY/SOCIAL HISTORY:  No family history on file. Social History     Socioeconomic History    Marital status: Single     Spouse name: Not on file    Number of children: Not on file    Years of education: Not on file    Highest education level: Not on file   Occupational History    Not on file   Tobacco Use    Smoking status: Not on file   Substance and Sexual Activity    Alcohol use: Not on file    Drug use: Not on file    Sexual activity: Not on file   Other Topics Concern    Not on file   Social History Narrative    Not on file     Social Determinants of Health     Financial Resource Strain:     Difficulty of Paying Living Expenses:    Food Insecurity:     Worried About Running Out of Food in the Last Year:     [address] in the Last Year:    Transportation Needs:     Lack of Transportation (Medical):      Lack of Transportation (Non-Medical):    Physical Activity:     Days of Exercise per Week:     Minutes of Exercise per Session:    Stress:     Feeling of Stress :    Social Connections:     Frequency of Communication with Friends and Family:     Frequency of Social Gatherings with Friends and Family:     Attends Jainism Services:     Active Member of Clubs or Organizations:     Attends Club or Organization Meetings:     Marital Status:    Intimate Partner Violence:     Fear of Current or Ex-Partner:     Emotionally Abused:     Physically Abused:     Sexually Abused:      LABS:  Lab Results   Component Value Date     07/21/2021    BUN 9 07/21/2021    CREATININE 0.47 (L) 07/21/2021    TSH 1.170 07/21/2021    WBC 7.3 07/21/2021     No results found for: PHENYTOIN, PHENOBARB, VALPROATE, CBMZ  No results found for: INR, PROTIME  No results found for: APTT  No results for input(s): ETOH in the last 72 hours.    ROS:  [x] All negative/unchanged except if checked. Explain positive(checked items) below:  [] Constitutional  [] Eyes  [] Ear/Nose/Mouth/Throat  [] Respiratory  [] CV  [] GI  []   [] Musculoskeletal  [] Skin/Breast  [] Neurological  [] Endocrine  [] Heme/Lymph  [] Allergic/Immunologic    Explanation:     MEDICATIONS:    Current Facility-Administered Medications:     lamoTRIgine (LAMICTAL) tablet 50 mg, 50 mg, Oral, Daily, Opal Vergara MD, 50 mg at 07/25/21 0810    propranolol (INDERAL) tablet 5 mg, 5 mg, Oral, TID PRN, Opal Vergara MD, 5 mg at 07/25/21 1546    pill splitter, , Does not apply, Once, Opal Vergara MD    acetaminophen (TYLENOL) tablet 650 mg, 650 mg, Oral, Q4H PRN, Bev Ding MD, 650 mg at 07/23/21 1218    magnesium hydroxide (MILK OF MAGNESIA) 400 MG/5ML suspension 30 mL, 30 mL, Oral, Daily PRN, Bev Ding MD    aluminum & magnesium hydroxide-simethicone (MAALOX) 200-200-20 MG/5ML suspension 30 mL, 30 mL, Oral, PRN, Bev Ding MD    haloperidol (HALDOL) tablet 5 mg, 5 mg, Oral, Q6H PRN **OR** haloperidol lactate (HALDOL) injection 5 mg, 5 mg, Intramuscular, Q6H PRN, Bev Ding MD    benztropine mesylate (COGENTIN) injection 2 mg, 2 mg, Intramuscular, BID PRN, Bev Ding MD    hydrOXYzine (VISTARIL) injection 50 mg, 50 mg, Intramuscular, Q6H PRN **OR** hydrOXYzine (VISTARIL) capsule 50 mg, 50 mg, Oral, Q6H PRN, Bev Ding MD    traZODone (DESYREL) tablet 50 mg, 50 mg, Oral, Nightly PRN, Bev Ding MD, 50 mg at 07/24/21 2242    PSYCHOTHERAPY/COUNSELING:  [x] Therapeutic interview  [x] Supportive  [] CBT  [x] Ongoing  [] Other    ASSESSMENT:    [x] Patient continues to need, on a daily basis, active treatment furnished directly by or requiring the supervision of inpatient psychiatric personnel     Diagnosis:  Active Problems:    [facility]  Resolved Problems:    * No resolved hospital problems. *  currently denies suicidal ideation but may be minimizing since she is preoccupied with discharge. Patient is benefitting from group therapy, getting better sleep. Will continue current medications. Treatment Plan:      DATE and changes made   7/23/21  o Lamotrigine (Lamictal) 50 mg for mood   o Trazodone (Desyrel) 50 mg every night as needed for sleep   o Propranolol hcl (Inderal) 5 mg three times daily as needed for anxiety       [x] Continue Current Medications  [x] Continue Follow-up and coordination of outpatient care in preparation for discharge from the hospital   [x] Continue Labs  [x] Risks, benefits, side effects, drug-to-drug interactions and alternatives to treatment were discussed in my usual manner.     Current medications:     Current Facility-Administered Medications:     lamoTRIgine (LAMICTAL) tablet 50 mg, 50 mg, Oral, Daily, Tyler Peterson MD, 50 mg at 07/25/21 0810    propranolol (INDERAL) tablet 5 mg, 5 mg, Oral, TID PRN, Tyler Peterson MD, 5 mg at 07/25/21 1546    pill splitter, , Does not apply, Once, Tyler Peterson MD    acetaminophen (TYLENOL) tablet 650 mg, 650 mg, Oral, Q4H PRN, Ade Avelar MD, 650 mg at 07/23/21 1218    magnesium hydroxide (MILK OF MAGNESIA) 400 MG/5ML suspension 30 mL, 30 mL, Oral, Daily PRN, Ade Avelar MD    aluminum & magnesium hydroxide-simethicone (MAALOX) 200-200-20 MG/5ML suspension 30 mL, 30 mL, Oral, PRN, Ade Avelar MD    haloperidol (HALDOL) tablet 5 mg, 5 mg, Oral, Q6H PRN **OR** haloperidol lactate (HALDOL) injection 5 mg, 5 mg, Intramuscular, Q6H PRN, Ade Avelar MD    benztropine mesylate (COGENTIN) injection 2 mg, 2 mg, Intramuscular, BID PRN, Ade Avelar MD    hydrOXYzine (VISTARIL) injection 50 mg, 50 mg, Intramuscular, Q6H PRN **OR** hydrOXYzine (VISTARIL) capsule 50 mg, 50 mg, Oral, Q6H PRN, Ade Avelar MD    traZODone (DESYREL) tablet 50 mg, 50 mg, Oral, Nightly PRN, Ade Avelar MD, 50 mg at 07/24/21 2246    ASSESSMENT/PLAN: NO CHANGES TO MEDICATIONS: Patient doing well on current medication regimen which is being titrated under the supervision of the physician. At this time, while patient continues to exhibit symptoms, we will not change medications and will continue to monitor for continued improvement. No acute concerns voiced.      Reason for more than one antipsychotic:  [x] N/A  [] 3 failed monotherapy(drugs tried):  [] Cross over to a new antipsychotic  [] Taper to monotherapy from polypharmacy  [] Augmentation of Clozapine therapy due to treatment resistance to single therapy      Electronically signed by Shakira Leggett MD on 7/25/2021 at 4:10 PM

## 2021-07-25 NOTE — GROUP NOTE
Group Therapy Note    Date: 7/25/2021    Group Start Time: 1100  Group End Time: 3918  Group Topic: Group Therapy    [facility] BERENICE Wiseman, RILEY        Group Therapy Note    Attendees: 7         Patient's Goal:  To participate in a goal oriented group. Notes:  Patient shared about her struggles with being passive and allowing people to walk all over her. She was able to accept support from the group. Status After Intervention:  Improved    Participation Level: Active Listener    Participation Quality: Appropriate      Speech:  normal      Thought Process/Content: Logical      Affective Functioning: Congruent      Mood: elevated      Level of consciousness:  Alert      Response to Learning: Able to verbalize current knowledge/experience      Endings: None Reported    Modes of Intervention: Education      Discipline Responsible: /Counselor      Signature:   Harini Wiseman, RILEY

## 2021-07-25 NOTE — PROGRESS NOTES
Pt remained visible on unit after phone call about grandmother. Seen laughing and joking with peers. States, \"I'm still working up the courage to call her\". Currently playing card game with  during visitation.

## 2021-07-25 NOTE — GROUP NOTE
Group Therapy Note    Date: 7/24/2021    Group Start Time: 2055  Group End Time: 2107  Group Topic: Wrap-Up    [facility]    Heather Martin        Group Therapy Note    Attendees: 6/11         Patient's Goal:  \"get through the day\"    Notes:  Patient reported meeting their goal for the day. Patient reported being happy she finished a puzzle.     Status After Intervention:  Unchanged    Participation Level: Interactive    Participation Quality: Appropriate, Attentive and Sharing      Speech:  normal      Thought Process/Content: Logical      Affective Functioning: Congruent      Mood: euthymic      Level of consciousness:  Alert and Attentive      Response to Learning: Progressing to goal      Endings: None Reported    Modes of Intervention: Support      Discipline Responsible: [place]      Signature:  Heather Martin

## 2021-07-25 NOTE — GROUP NOTE
Group Therapy Note    Date: 7/24/2021    Group Start Time: 2000  Group End Time: 2030  Group Topic: Recreational    [facility]    Keyon Feliz        Group Therapy Note    Attendees: 6/11         Patient's Goal:  To play one of the provided games or puzzles. Notes:  Patient actively participated in group.     Status After Intervention:  Improved    Participation Level: Interactive    Participation Quality: Appropriate and Attentive      Speech:  normal      Thought Process/Content: Logical      Affective Functioning: Congruent      Mood: euthymic      Level of consciousness:  Alert and Attentive      Response to Learning: Progressing to goal      Endings: None Reported    Modes of Intervention: Activity      Discipline Responsible: [employer]      Signature:  Keyon Feliz

## 2021-07-25 NOTE — GROUP NOTE
Group Therapy Note    Date: 7/25/2021    Group Start Time: 1600  Group End Time: 1700  Group Topic: Healthy Living/Wellness    [facility]    Santos Montesinos        Group Therapy Note    Attendees: 3/11         Patient's Goal:  To learn about Alcoholics Anonymous     Notes:  Patient actively listened in group and participated in discussion. Patient also followed up with group leaders after. Patient took AA book to learn further. Patient also got a group leaders contact information to follow up after discharge. Status After Intervention:  Improved    Participation Level:  Active Listener and Interactive    Participation Quality: Appropriate, Attentive and Sharing      Speech:  normal      Thought Process/Content: Logical      Affective Functioning: Congruent      Mood: anxious      Level of consciousness:  Alert and Attentive      Response to Learning: Able to verbalize/acknowledge new learning, Capable of insight and Progressing to goal      Endings: None Reported    Modes of Intervention: Education      Discipline Responsible: [address], [facility] [employer]      Signature:  Santos Montesinos

## 2021-07-25 NOTE — PROGRESS NOTES
Patient called and left a voice message stating that he has an issue with his insurance for his medication and his blood sugars are running high again  Today they are around 264  He needs to get his medication but there is an issue of some kind with his insurance  Please called the patient at [phone]  Pt at the desk requested/ received PRN trazodone for sleep.

## 2021-07-26 VITALS
HEIGHT: 58 IN | SYSTOLIC BLOOD PRESSURE: 94 MMHG | HEART RATE: 108 BPM | DIASTOLIC BLOOD PRESSURE: 63 MMHG | OXYGEN SATURATION: 100 % | WEIGHT: 90 LBS | BODY MASS INDEX: 18.89 KG/M2 | RESPIRATION RATE: 18 BRPM | TEMPERATURE: 99 F

## 2021-07-26 PROBLEM — F43.10 PTSD (POST-TRAUMATIC STRESS DISORDER): Status: ACTIVE | Noted: 2021-07-26

## 2021-07-26 PROBLEM — F31.9 BIPOLAR DEPRESSION (HCC): Status: ACTIVE | Noted: 2021-07-21

## 2021-07-26 PROCEDURE — 6370000000 HC RX 637 (ALT 250 FOR IP): Performed by: PSYCHIATRY & NEUROLOGY

## 2021-07-26 PROCEDURE — 99239 HOSP IP/OBS DSCHRG MGMT >30: CPT | Performed by: PSYCHIATRY & NEUROLOGY

## 2021-07-26 RX ORDER — LAMOTRIGINE 25 MG/1
50 TABLET ORAL DAILY
Qty: 30 TABLET | Refills: 2 | Status: SHIPPED | OUTPATIENT
Start: 2021-07-27

## 2021-07-26 RX ORDER — PROPRANOLOL HYDROCHLORIDE 10 MG/1
5 TABLET ORAL 3 TIMES DAILY PRN
Qty: 15 TABLET | Refills: 1 | Status: SHIPPED | OUTPATIENT
Start: 2021-07-26

## 2021-07-26 RX ORDER — TRAZODONE HYDROCHLORIDE 50 MG/1
50 TABLET ORAL NIGHTLY PRN
Qty: 15 TABLET | Refills: 2 | Status: SHIPPED | OUTPATIENT
Start: 2021-07-26

## 2021-07-26 RX ADMIN — LAMOTRIGINE 50 MG: 25 TABLET ORAL at 08:19

## 2021-07-26 NOTE — GROUP NOTE
Group Therapy Note    Date: 7/26/2021    Group Start Time: 1110  Group End Time: 1937  Group Topic: Group Therapy    [facility]    RILEY Ponce        Group Therapy Note    Attendees: 3         Patient's Goal: To participate in a goal oriented group. Notes:  Patient stated her goal is to see her grandmother to say goodbye before she passes away. Status After Intervention:  Improved    Participation Level: Active Listener    Participation Quality: Appropriate      Speech:  normal      Thought Process/Content: Logical      Affective Functioning: Congruent      Mood: elevated      Level of consciousness:  Alert      Response to Learning: Able to verbalize current knowledge/experience      Endings: None Reported    Modes of Intervention: Education      Discipline Responsible: /Counselor      Signature:   RILEY Ponce

## 2021-07-26 NOTE — PROGRESS NOTES
Pt attended the 0900 morning community meeting.      Electronically signed by Torsten Maxwell OT on 7/26/2021 at 9:19 AM

## 2021-07-26 NOTE — GROUP NOTE
Group Therapy Note    Date: 7/25/2021    Group Start Time: 1905  Group End Time: 1955  Group Topic: Recreational    [facility]    Santos Montesinos        Group Therapy Note    Attendees: 6/11         Patient's Goal:  To participate in group activity    Notes:  Patient actively participated in group activity    Status After Intervention:  Unchanged    Participation Level: Interactive    Participation Quality: Appropriate and Attentive      Speech:  normal      Thought Process/Content: Logical      Affective Functioning: Congruent      Mood: euthymic      Level of consciousness:  Alert and Attentive      Response to Learning: Progressing to goal      Endings: None Reported    Modes of Intervention: Activity      Discipline Responsible: [address], [email or web address]      Signature:  Santos Montesinos

## 2021-07-26 NOTE — GROUP NOTE
Group Therapy Note    Date: 7/25/2021    Group Start Time: 2100  Group End Time: 2115  Group Topic: Wrap-Up    [facility]    Estefania Rosenberg        Group Therapy Note    Attendees: 3/11         Patient's Goal:  \"to get through the day\"    Notes:  Patient reported feeling like she didn't get through the day after finding out about her grandma. Patient mentioned she had fun playing games with peers today. Patient participated in a guided meditation. Status After Intervention:  Unchanged    Participation Level:  Active Listener and Interactive    Participation Quality: Appropriate, Attentive and Sharing      Speech:  normal      Thought Process/Content: Logical      Affective Functioning: Congruent      Mood: depressed      Level of consciousness:  Alert and Attentive      Response to Learning: Progressing to goal      Endings: None Reported    Modes of Intervention: Support      Discipline Responsible: [address] [facility] [place]      Signature:  Estefania Rosenberg

## 2021-07-26 NOTE — CARE COORDINATION
FAMILY COLLATERAL NOTE    Family/Support Name:  Jarrett Dickerson #-: [phone]  Relationship to Pt[de-identified]          Family/Support contact aware of hospitalization: yes  Presenting Symptoms/Current Concerns:  Presented to ER after  contacted Shira Donis due to [facility]. History of hospitalizations at [facility]. Reports being linked with scar. Top 3 Life Stressors:   Needs to work with her counselor more   [employer] college - 5 years into a two year college degree  House is dirty      Background History Relevant to Current Hospitalization:  Report spt has been really depressed and states they have had marital issues the past few years. Sherri Rain was super super mad at me because I was talking to female friend as friends and pt freaked out. \"   Reports pt has trauma from her childhood involving her mom. Recently found out who her father was. She reports pt struggles a lot with her mental health. Struggles to engage with her [address] treatment, get a job, work on her schooling and self destruct and quit everything. Reports this can happen within a few months. Reports pt sounds so much better than when she was admitted. Reports he is comfortable with her returning home. Family Mental Health/Substance Use History:   Maternal side has a history of depression. Maternal grandma schizophrenia. Support Network's Goal for Hospitalization: \"she needs to get the hep she needs and use her support/counselor. \"    Discharge Plan: discharge home and link with outpatient services       Support Network Supportive of Discharge Plan: in agreement        Support can confirm Safety of Location and Security of Weapons:  has a firearm at home in a safe that pt does not have access to.        Support agreeable to Safeguard and Monitor Medications (including Prescription and OTC): agreeable     Identified Barriers to Compliance with Discharge Plan: Marital issues     Recommendations for Support Network: be available for follow up calls         OLIVIA Campbell

## 2021-07-26 NOTE — DISCHARGE SUMMARY
[facility]  Hospitalization: yes, 2 prior stays, both were at [facility], last in May  Hx of Suicidal Attempts: \"a long time ago\" overdosed on medications, (years ago) has had thoughts of suicidal ideation but no plan recently  Hx of violence:  no   ECT: no     PAST MEDICAL/PSYCHIATRIC HISTORY:   No past medical history on file. FAMILY/SOCIAL HISTORY:  No family history on file. Social History     Socioeconomic History    Marital status: Single     Spouse name: Not on file    Number of children: Not on file    Years of education: Not on file    Highest education level: Not on file   Occupational History    Not on file   Tobacco Use    Smoking status: Not on file   Substance and Sexual Activity    Alcohol use: Not on file    Drug use: Not on file    Sexual activity: Not on file   Other Topics Concern    Not on file   Social History Narrative    Not on file     Social Determinants of Health     Financial Resource Strain:     Difficulty of Paying Living Expenses:    Food Insecurity:     Worried About Running Out of Food in the Last Year:     [address] in the Last Year:    Transportation Needs:     Lack of Transportation (Medical):      Lack of Transportation (Non-Medical):    Physical Activity:     Days of Exercise per Week:     Minutes of Exercise per Session:    Stress:     Feeling of Stress :    Social Connections:     Frequency of Communication with Friends and Family:     Frequency of Social Gatherings with Friends and Family:     Attends Yarsanism Services:     Active Member of Clubs or Organizations:     Attends Club or Organization Meetings:     Marital Status:    Intimate Partner Violence:     Fear of Current or Ex-Partner:     Emotionally Abused:     Physically Abused:     Sexually Abused:        MEDICATIONS:    Current Facility-Administered Medications:     lamoTRIgine (LAMICTAL) tablet 50 mg, 50 mg, Oral, Daily, Jacki Lott MD, 50 mg at 07/26/21 0819    propranolol (INDERAL) tablet 5 mg, 5 mg, Oral, TID PRN, Serina Clark MD, 5 mg at 07/25/21 2049    pill splitter, , Does not apply, Once, Serina Clark MD    acetaminophen (TYLENOL) tablet 650 mg, 650 mg, Oral, Q4H PRN, Riki Rob MD, 650 mg at 07/23/21 1218    magnesium hydroxide (MILK OF MAGNESIA) 400 MG/5ML suspension 30 mL, 30 mL, Oral, Daily PRN, Riki Rob MD    aluminum & magnesium hydroxide-simethicone (MAALOX) 200-200-20 MG/5ML suspension 30 mL, 30 mL, Oral, PRN, Riki Rob MD    haloperidol (HALDOL) tablet 5 mg, 5 mg, Oral, Q6H PRN **OR** haloperidol lactate (HALDOL) injection 5 mg, 5 mg, Intramuscular, Q6H PRN, Riki Rob MD    benztropine mesylate (COGENTIN) injection 2 mg, 2 mg, Intramuscular, BID PRN, Riki Rob MD    hydrOXYzine (VISTARIL) injection 50 mg, 50 mg, Intramuscular, Q6H PRN **OR** hydrOXYzine (VISTARIL) capsule 50 mg, 50 mg, Oral, Q6H PRN, Riki Rob MD    traZODone (DESYREL) tablet 50 mg, 50 mg, Oral, Nightly PRN, Riki Rob MD, 50 mg at 07/25/21 2152    Examination:  BP 94/63   Pulse 108   Temp 99 °F (37.2 °C)   Resp 18   Ht 4' 10\" (1.473 m)   Wt 90 lb (40.8 kg)   SpO2 100%   BMI 18.81 kg/m²   Gait - steady    HOSPITAL COURSE[de-identified]  Following admission to the hospital, patient had a complete physical exam and blood work up  Patient was monitored closely with suicide precaution  Patient was started on lamictal  Was encouraged to participate in group and other milieu activity  Patient started to feel better with this combination of treatment. Significant progress in the symptoms since admission.     Mood better, with the score of 2/10 - bad  No AVH or paranoid thoughts  No Hopeless or worthless feeling  No active SI/HI  Appetite:  [x] Normal  [] Increased  [] Decreased    Sleep:       [x] Normal  [] Fair       [] Poor            Energy:    [x] Normal  [] Increased  [] Decreased     SI [] Present  [x] Absent  HI  []Present  [x] Absent Aggression:  [] yes  [] no  Patient is [x] able  [] unable to CONTRACT FOR SAFETY   Medication side effects(SE):  [x] None(Psych. Meds.) [] Other      Mental Status Examination on discharge:    Level of consciousness:  within normal limits   Appearance:  well-appearing  Behavior/Motor:  no abnormalities noted  Attitude toward examiner:  attentive and good eye contact  Speech:  spontaneous, normal rate and normal volume   Mood: euthymic  Affect:  mood congruent  Thought processes:  coherent   Thought content:  Suicidal Ideation:  denies suicidal ideation  Cognition:  oriented to person, place, and time   Concentration intact  Memory intact  Insight good   Judgement fair   Fund of Knowledge adequate      ASSESSMENT:  Patient symptoms are:  [x] Well controlled  [x] Improving  [] Worsening  [] No change      Diagnosis:  Principal Problem:    Bipolar depression ([facility] [address]  Active Problems:    PTSD (post-traumatic stress disorder)  Resolved Problems:    * No resolved hospital problems. *      LABS:    No results for input(s): WBC, HGB, PLT in the last 72 hours. No results for input(s): NA, K, CL, CO2, BUN, CREATININE, GLUCOSE in the last 72 hours. No results for input(s): BILITOT, ALKPHOS, AST, ALT in the last 72 hours. Lab Results   Component Value Date    LABAMPH Neg 07/21/2021    BARBSCNU Neg 07/21/2021    LABBENZ Neg 07/21/2021    LABMETH Neg 07/21/2021    OPIATESCREENURINE Neg 07/21/2021    PHENCYCLIDINESCREENURINE Neg 07/21/2021    ETOH <10 07/21/2021     Lab Results   Component Value Date    TSH 1.170 07/21/2021     No results found for: LITHIUM  No results found for: VALPROATE, CBMZ    RISK ASSESSMENT AT DISCHARGE: Low risk for suicide and homicide. Treatment Plan:  Reviewed current Medications with the patient. Education provided on the complaince with treatment. Risks, benefits, side effects, drug-to-drug interactions and alternatives to treatment were discussed.     Encourage patient to attend outpatient follow up appointment and therapy. Patient was advised to call the outpatient provider, visit the nearest ED or call 911 if symptoms are not manageable. Patient's family member was contacted prior to the discharge.          Medication List      START taking these medications    lamoTRIgine 25 MG tablet  Commonly known as: LAMICTAL  Take 2 tablets by mouth daily  Start taking on: July 27, 2021     propranolol 10 MG tablet  Commonly known as: INDERAL  Take 0.5 tablets by mouth 3 times daily as needed (anxiety)        CHANGE how you take these medications    traZODone 50 MG tablet  Commonly known as: DESYREL  Take 1 tablet by mouth nightly as needed for Sleep  What changed:   · medication strength  · how much to take  · when to take this  · reasons to take this        STOP taking these medications    gabapentin 100 MG capsule  Commonly known as: NEURONTIN     Trintellix 5 MG tablet  Generic drug: VORTIoxetine           Where to Get Your Medications      [facility] [address] [facility] [phone]  [address] [place] [phone]    Hours: 24-hours Phone: [phone]   · lamoTRIgine 25 MG tablet  · propranolol 10 MG tablet  · traZODone 50 MG tablet           Reason for more than one antipsychotic:   [x] N/A  [] 3 failed monotherapy(drugs tried):  [] Cross over to a new antipsychotic  [] Taper to monotherapy from polypharmacy  [] Augmentation of Clozapine therapy due to treatment resistance to single therapy        TIME SPEND - 35 MINUTES TO COMPLETE THE EVALUATION, DISCHARGE SUMMARY, MEDICATION RECONCILIATION AND FOLLOW UP CARE     Signed:  Sindi Mueller MD  7/26/2021  9:38 AM

## 2021-07-26 NOTE — GROUP NOTE
Group Therapy Note    Date: 7/26/2021    Group Start Time: 1000  Group End Time: 1050  Group Topic: Psychoeducation    [facility]    Damon Song OT        Group Therapy Note    Attendees: 4         Patient's Goal:  \"To see my grandmother\"     Notes:  Pt demonstrated good sustained attention to task and was appropriate in conversations with other group members. Status After Intervention:  Improved    Participation Level:  Active Listener and Interactive    Participation Quality: Appropriate, Attentive and Sharing      Speech:  normal      Thought Process/Content: Logical      Affective Functioning: Congruent      Mood: appropriate      Level of consciousness:  Alert and Attentive      Response to Learning: Capable of insight and Progressing to goal      Endings: None Reported    Modes of Intervention: Support, Socialization and Problem-solving      Discipline Responsible: Psychoeducational Specialist      Signature:  Damon Song OT

## 2021-09-30 NOTE — FLOWSHEET NOTE
Patient at desk requesting propanolol due to anxiety of 9 out of 10 with 10 being the worst.
Patient has been out, with bright affect and socializing with peers after spending most of morning on phone. Patient is alert and orient x 4, adits to depression at times and anxiety but denies SI/HI/AVH. Patient reports good sleep and appetite.
Patient is alert and orient x 4, has sad, flat affect, is upset she is not discharged. Has not been very social, has been laying in bed or on telephone, patient denies depression but says her anxiety is a 9/10 and visteril was given. Patient denies SI/HI and hallucinations.  She reports good sleep and appetite
Patient is alert and orient x 4, out on unit and attending groups. Patient has bright affect at most times, and is social with peers. Patient rates her depression a 5 and her anxiety a 4, out of 10 with 10 being the worst.  Patient denies SI/HI and hallucinations. Patient spoke of college and that she has 3 more classes til she has her associates degree and due to being here she is afraid they may fail her this semester. Patient spoke of plans of trying to work things out with spouse and even wrote out a list of things she wants and does not want so they can have a better relatonship. Patient admits she has difficulty communicating with others. Patient reports good sleep and that she does not eat a lot but that is normal for her.
Patient was on phone in [place] and was noted to be crying, afterwards she came to desk requesting something for anxiety. Asked patient if everything is okay and patient got very tearful, crying, stated Keyshawn bautista is dying, the doctor only gave her til Tuesday and I was told I will be here til Tuesday, so I will not be able to see her and tell her goodbye \". Manuelito RN spoke with patient and she was able to calm self down and was given her propanolol. This nurse spoke to patient and told her Dr Tiera Lizarraga would be in tomorrow and it will be his decision.
Pt did not attend group despite encouragement.
36.3

## 2023-02-22 LAB — GROUP A STREP SCREEN, CULTURE: NORMAL

## 2023-02-26 LAB — GROUP A STREP SCREEN, CULTURE: NORMAL

## 2023-03-15 ENCOUNTER — OFFICE VISIT (OUTPATIENT)
Dept: PRIMARY CARE | Facility: CLINIC | Age: 28
End: 2023-03-15
Payer: COMMERCIAL

## 2023-03-15 VITALS
TEMPERATURE: 98 F | WEIGHT: 97 LBS | HEART RATE: 94 BPM | SYSTOLIC BLOOD PRESSURE: 110 MMHG | DIASTOLIC BLOOD PRESSURE: 68 MMHG | BODY MASS INDEX: 20.27 KG/M2 | OXYGEN SATURATION: 98 %

## 2023-03-15 DIAGNOSIS — Z20.818 EXPOSURE TO STREP THROAT: ICD-10-CM

## 2023-03-15 LAB — POC RAPID STREP: NEGATIVE

## 2023-03-15 PROCEDURE — 1036F TOBACCO NON-USER: CPT | Performed by: NURSE PRACTITIONER

## 2023-03-15 PROCEDURE — 99214 OFFICE O/P EST MOD 30 MIN: CPT | Performed by: NURSE PRACTITIONER

## 2023-03-15 PROCEDURE — 87880 STREP A ASSAY W/OPTIC: CPT | Performed by: NURSE PRACTITIONER

## 2023-03-15 PROCEDURE — U0005 INFEC AGEN DETEC AMPLI PROBE: HCPCS

## 2023-03-15 PROCEDURE — 87636 SARSCOV2 & INF A&B AMP PRB: CPT

## 2023-03-15 PROCEDURE — 87651 STREP A DNA AMP PROBE: CPT

## 2023-03-15 RX ORDER — METRONIDAZOLE 500 MG/1
TABLET ORAL
COMMUNITY
Start: 2023-03-01 | End: 2023-07-17 | Stop reason: ALTCHOICE

## 2023-03-15 RX ORDER — ALBUTEROL SULFATE 90 UG/1
2 AEROSOL, METERED RESPIRATORY (INHALATION) EVERY 4 HOURS PRN
COMMUNITY
Start: 2016-03-18

## 2023-03-15 RX ORDER — FLUTICASONE PROPIONATE 50 MCG
2 SPRAY, SUSPENSION (ML) NASAL DAILY
COMMUNITY
Start: 2020-10-20 | End: 2023-07-17 | Stop reason: ALTCHOICE

## 2023-03-15 ASSESSMENT — ENCOUNTER SYMPTOMS
VOMITING: 0
NAUSEA: 0
CHILLS: 0
SHORTNESS OF BREATH: 0
COUGH: 0
SORE THROAT: 1
DIARRHEA: 0
HEADACHES: 0
ABDOMINAL PAIN: 0
MYALGIAS: 0
FEVER: 0

## 2023-03-15 ASSESSMENT — PAIN SCALES - GENERAL: PAINLEVEL: 6

## 2023-03-15 NOTE — PROGRESS NOTES
Son tested positive for strep. Woke up with sore throat this morning. Patient has left ear pain that started today. Hurts to swallow. Took tylenol and it helped. Pt is vaccinated against COVID with no boosters.     Patient is pregnant         Review of Systems   Constitutional:  Negative for chills and fever.   HENT:  Positive for congestion, ear pain and sore throat.    Respiratory:  Negative for cough and shortness of breath.    Gastrointestinal:  Negative for abdominal pain, diarrhea, nausea and vomiting.   Musculoskeletal:  Negative for myalgias.   Neurological:  Negative for headaches.       Objective   /68 (BP Location: Left arm, Patient Position: Sitting, BP Cuff Size: Adult)   Pulse 94   Temp 36.7 °C (98 °F) (Temporal)   Wt 44 kg (97 lb)   LMP  (LMP Unknown)   SpO2 98%   BMI 20.27 kg/m²     Physical Exam  Vitals reviewed.   Constitutional:       General: She is not in acute distress.     Appearance: Normal appearance. She is not ill-appearing.   HENT:      Right Ear: Tympanic membrane, ear canal and external ear normal.      Left Ear: Tympanic membrane, ear canal and external ear normal.      Nose: Congestion (slightly congested) present.      Mouth/Throat:      Mouth: Mucous membranes are moist.      Pharynx: Oropharynx is clear. No posterior oropharyngeal erythema.      Comments: Tonsils surgically absent  Eyes:      Conjunctiva/sclera: Conjunctivae normal.   Cardiovascular:      Rate and Rhythm: Normal rate and regular rhythm.      Pulses: Normal pulses.      Heart sounds: Normal heart sounds. No murmur heard.  Pulmonary:      Effort: Pulmonary effort is normal.      Breath sounds: Normal breath sounds. No wheezing.   Musculoskeletal:         General: Normal range of motion.      Cervical back: Neck supple.   Skin:     General: Skin is warm and dry.   Neurological:      General: No focal deficit present.      Mental Status: She is alert.   Psychiatric:         Mood and Affect: Mood normal.          Behavior: Behavior normal.         Assessment/Plan   Problem List Items Addressed This Visit    None  Visit Diagnoses       Exposure to strep throat        Relevant Orders    POCT Rapid Strep A manually resulted (Completed)    Group A Streptococcus, PCR    Sars-CoV-2 and Influenza A/B PCR, Symptomatic          Rapid strep negative in the office. will get back up strep testing and PCR COVID/flu tests. Advised patient on use of humidifier and hot steam treatments. Discussed that patient is to drink plenty of fluids and stay well hydrated. May use flonase. Can take tylenol every four to six hours as needed for any fevers or discomfort. Discussed that patient is to go to the ER for any decreased fluid intake/urine output, difficulty breathing, shortness of breath or new/concerning symptoms; she agreed. Will call pt when results become available. Pt reminded that she is to self quarantine until feeling better, results become available; she agreed. pt to follow up in 2-3 days if symptoms are not improving.

## 2023-03-16 ENCOUNTER — TELEPHONE (OUTPATIENT)
Dept: PRIMARY CARE | Facility: CLINIC | Age: 28
End: 2023-03-16
Payer: COMMERCIAL

## 2023-03-16 DIAGNOSIS — U07.1 COVID-19: Primary | ICD-10-CM

## 2023-03-16 LAB
FLU A RESULT: NOT DETECTED
FLU B RESULT: NOT DETECTED
GROUP A STREP, PCR: NOT DETECTED
SARS-COV-2 RESULT: DETECTED

## 2023-03-16 NOTE — TELEPHONE ENCOUNTER
Spoke to patient and relayed results of positive COVID testing. Patient is to let her OB know that she is positive for COVID. Pt is to use supportive care. Advised that she is to self quarantine at least five days since symptoms started and until she is feeling better. She was advised to wear a mask in public for at least ten days after quarantine; she agreed. Pt advised to go to the ER for any worsening or new/concerning symptoms; she agreed.

## 2023-03-23 ENCOUNTER — HOSPITAL ENCOUNTER (OUTPATIENT)
Dept: DATA CONVERSION | Facility: HOSPITAL | Age: 28
End: 2023-03-23
Attending: OBSTETRICS & GYNECOLOGY
Payer: COMMERCIAL

## 2023-03-23 DIAGNOSIS — Z3A.27 27 WEEKS GESTATION OF PREGNANCY (HHS-HCC): ICD-10-CM

## 2023-03-23 DIAGNOSIS — R10.31 RIGHT LOWER QUADRANT PAIN: ICD-10-CM

## 2023-03-23 DIAGNOSIS — J45.909 UNSPECIFIED ASTHMA, UNCOMPLICATED (HHS-HCC): ICD-10-CM

## 2023-03-23 DIAGNOSIS — O47.02: ICD-10-CM

## 2023-03-23 DIAGNOSIS — O99.512 DISEASES OF THE RESPIRATORY SYSTEM COMPLICATING PREGNANCY, SECOND TRIMESTER (HHS-HCC): ICD-10-CM

## 2023-03-23 DIAGNOSIS — O99.322 DRUG USE COMPLICATING PREGNANCY, SECOND TRIMESTER (HHS-HCC): ICD-10-CM

## 2023-03-23 DIAGNOSIS — O26.892 OTHER SPECIFIED PREGNANCY RELATED CONDITIONS, SECOND TRIMESTER (HHS-HCC): ICD-10-CM

## 2023-03-23 DIAGNOSIS — F12.90 CANNABIS USE, UNSPECIFIED, UNCOMPLICATED: ICD-10-CM

## 2023-03-23 DIAGNOSIS — Z86.16 PERSONAL HISTORY OF COVID-19: ICD-10-CM

## 2023-03-23 LAB
ACTIVATED PARTIAL THROMBOPLASTIN TIME IN PPP BY COAGULATION ASSAY: 25 SEC (ref 26–39)
ERYTHROCYTE DISTRIBUTION WIDTH (RATIO) BY AUTOMATED COUNT: 12.9 % (ref 11.5–14.5)
ERYTHROCYTE MEAN CORPUSCULAR HEMOGLOBIN CONCENTRATION (G/DL) BY AUTOMATED: 32.8 G/DL (ref 32–36)
ERYTHROCYTE MEAN CORPUSCULAR VOLUME (FL) BY AUTOMATED COUNT: 92 FL (ref 80–100)
ERYTHROCYTES (10*6/UL) IN BLOOD BY AUTOMATED COUNT: 3.33 X10E12/L (ref 4–5.2)
FIBRINOGEN (MG/DL) IN PPP BY COAGULATION ASSAY: 368 MG/DL (ref 200–400)
HEMATOCRIT (%) IN BLOOD BY AUTOMATED COUNT: 30.8 % (ref 36–46)
HEMOGLOBIN (G/DL) IN BLOOD: 10.1 G/DL (ref 12–16)
INR IN PPP BY COAGULATION ASSAY: 0.9 (ref 0.9–1.1)
LEUKOCYTES (10*3/UL) IN BLOOD BY AUTOMATED COUNT: 13.8 X10E9/L (ref 4.4–11.3)
NRBC (PER 100 WBCS) BY AUTOMATED COUNT: 0 /100 WBC (ref 0–0)
PLATELETS (10*3/UL) IN BLOOD AUTOMATED COUNT: 254 X10E9/L (ref 150–450)
PROTHROMBIN TIME (PT) IN PPP BY COAGULATION ASSAY: 10.7 SEC (ref 9.8–13.4)

## 2023-03-30 LAB
ERYTHROCYTE DISTRIBUTION WIDTH (RATIO) BY AUTOMATED COUNT: 13.1 % (ref 11.5–14.5)
ERYTHROCYTE MEAN CORPUSCULAR HEMOGLOBIN CONCENTRATION (G/DL) BY AUTOMATED: 32.8 G/DL (ref 32–36)
ERYTHROCYTE MEAN CORPUSCULAR VOLUME (FL) BY AUTOMATED COUNT: 94 FL (ref 80–100)
ERYTHROCYTES (10*6/UL) IN BLOOD BY AUTOMATED COUNT: 3.13 X10E12/L (ref 4–5.2)
GLUCOSE, 1 HR SCREEN, PREG: 141 MG/DL
HEMATOCRIT (%) IN BLOOD BY AUTOMATED COUNT: 29.3 % (ref 36–46)
HEMOGLOBIN (G/DL) IN BLOOD: 9.6 G/DL (ref 12–16)
LEUKOCYTES (10*3/UL) IN BLOOD BY AUTOMATED COUNT: 13.2 X10E9/L (ref 4.4–11.3)
PLATELETS (10*3/UL) IN BLOOD AUTOMATED COUNT: 252 X10E9/L (ref 150–450)
SYPHILIS TOTAL AB: NONREACTIVE

## 2023-05-26 LAB — GROUP B STREP SCREEN: NORMAL

## 2023-06-09 ENCOUNTER — HOSPITAL ENCOUNTER (OUTPATIENT)
Dept: DATA CONVERSION | Facility: HOSPITAL | Age: 28
End: 2023-06-09
Attending: OBSTETRICS & GYNECOLOGY
Payer: COMMERCIAL

## 2023-06-09 DIAGNOSIS — Z3A.38 38 WEEKS GESTATION OF PREGNANCY (HHS-HCC): ICD-10-CM

## 2023-07-17 ENCOUNTER — OFFICE VISIT (OUTPATIENT)
Dept: PRIMARY CARE | Facility: CLINIC | Age: 28
End: 2023-07-17
Payer: COMMERCIAL

## 2023-07-17 VITALS
TEMPERATURE: 98.2 F | OXYGEN SATURATION: 98 % | HEART RATE: 88 BPM | RESPIRATION RATE: 18 BRPM | SYSTOLIC BLOOD PRESSURE: 112 MMHG | WEIGHT: 95 LBS | DIASTOLIC BLOOD PRESSURE: 72 MMHG | BODY MASS INDEX: 19.86 KG/M2

## 2023-07-17 DIAGNOSIS — J06.9 UPPER RESPIRATORY TRACT INFECTION, UNSPECIFIED TYPE: ICD-10-CM

## 2023-07-17 DIAGNOSIS — J02.9 SORE THROAT: Primary | ICD-10-CM

## 2023-07-17 LAB — POC RAPID STREP: NEGATIVE

## 2023-07-17 PROCEDURE — 87880 STREP A ASSAY W/OPTIC: CPT | Performed by: NURSE PRACTITIONER

## 2023-07-17 PROCEDURE — 99213 OFFICE O/P EST LOW 20 MIN: CPT | Performed by: NURSE PRACTITIONER

## 2023-07-17 PROCEDURE — 1036F TOBACCO NON-USER: CPT | Performed by: NURSE PRACTITIONER

## 2023-07-17 PROCEDURE — 87651 STREP A DNA AMP PROBE: CPT

## 2023-07-17 RX ORDER — AMOXICILLIN 400 MG/5ML
POWDER, FOR SUSPENSION ORAL
Qty: 220 ML | Refills: 0 | Status: SHIPPED | OUTPATIENT
Start: 2023-07-17 | End: 2024-02-15 | Stop reason: WASHOUT

## 2023-07-17 RX ORDER — FLUOXETINE 20 MG/1
1 TABLET ORAL DAILY
COMMUNITY
Start: 2023-06-19

## 2023-07-17 RX ORDER — ALBUTEROL SULFATE 90 UG/1
2 AEROSOL, METERED RESPIRATORY (INHALATION) EVERY 4 HOURS PRN
Qty: 8.5 G | Refills: 0 | Status: SHIPPED | OUTPATIENT
Start: 2023-07-17 | End: 2023-08-16

## 2023-07-17 RX ORDER — FLUTICASONE PROPIONATE 50 MCG
1 SPRAY, SUSPENSION (ML) NASAL DAILY
Qty: 16 G | Refills: 0 | Status: SHIPPED | OUTPATIENT
Start: 2023-07-17 | End: 2023-08-16

## 2023-07-17 ASSESSMENT — ENCOUNTER SYMPTOMS
FEVER: 0
APPETITE CHANGE: 0
CHILLS: 0
ABDOMINAL PAIN: 0
SORE THROAT: 1
VOMITING: 0
MYALGIAS: 0
DIARRHEA: 0
COUGH: 1
FATIGUE: 0
NAUSEA: 0
HEADACHES: 1
SHORTNESS OF BREATH: 0
WHEEZING: 0

## 2023-07-17 NOTE — PROGRESS NOTES
Subjective   Patient ID: Dorothy Drummond is a 27 y.o. female who presents for Sore Throat.    Symptoms started on Friday with a bad cough and sore throat. Patient is vaccinated against COVID. Patient has taken tylenol and it did not help. Normal urine output. No fever.     Review of Systems   Constitutional:  Negative for appetite change, chills, fatigue and fever.   HENT:  Positive for postnasal drip and sore throat. Negative for congestion.    Respiratory:  Positive for cough. Negative for shortness of breath and wheezing.    Gastrointestinal:  Negative for abdominal pain, diarrhea, nausea and vomiting.   Musculoskeletal:  Negative for myalgias.   Neurological:  Positive for headaches.     Objective   /72   Pulse 88   Temp 36.8 °C (98.2 °F) (Temporal)   Resp 18   Wt (!) 43.1 kg (95 lb)   LMP 09/14/2022   SpO2 98%   BMI 19.86 kg/m²     Physical Exam  Vitals reviewed.   Constitutional:       General: She is not in acute distress.     Appearance: Normal appearance. She is not ill-appearing.   HENT:      Head: Atraumatic.      Right Ear: Tympanic membrane, ear canal and external ear normal.      Left Ear: Tympanic membrane, ear canal and external ear normal.      Nose: Congestion present. No rhinorrhea.      Mouth/Throat:      Pharynx: Posterior oropharyngeal erythema present. No oropharyngeal exudate.      Comments: Tonsils surgically absent, uvula midline, moderate post nasal drainage present   Eyes:      Conjunctiva/sclera: Conjunctivae normal.   Cardiovascular:      Rate and Rhythm: Normal rate and regular rhythm.      Heart sounds: Normal heart sounds. No murmur heard.  Pulmonary:      Effort: Pulmonary effort is normal.      Breath sounds: Normal breath sounds. No wheezing.   Musculoskeletal:         General: Normal range of motion.   Skin:     General: Skin is warm and dry.   Neurological:      General: No focal deficit present.      Mental Status: She is alert.   Psychiatric:         Mood and  Affect: Mood normal.         Behavior: Behavior normal.     Assessment/Plan   Problem List Items Addressed This Visit    None  Visit Diagnoses       Sore throat    -  Primary    Relevant Orders    Group A Streptococcus, PCR    POCT rapid strep A manually resulted    Upper respiratory tract infection, unspecified type        Relevant Medications    albuterol (ProAir HFA) 90 mcg/actuation inhaler    fluticasone (Flonase) 50 mcg/actuation nasal spray    amoxicillin (Amoxil) 400 mg/5 mL suspension          Rapid strep is negative. Will send back up strep testing for patient. Patient had COVID in March. Will send in inhaler for patient to use every four to six hours as needed. Advised patient on use of humidifier and hot steam treatments. Discussed that patient is to drink plenty of fluids and stay well hydrated. Can take tylenol as needed for any fevers or discomfort. Patient can use flonase as well. Discussed that patient is to go to the ER for any chest pain, difficulty breathing, shortness of breath or new/concerning symptoms; she agreed. Will call pt when results become available. Called in rx for amoxicillin for pt to start if not better in the next 2-3 days. She is breastfeeding, but we discussed that amoxicillin is safe to use during pregnancy.

## 2023-07-18 LAB — GROUP A STREP, PCR: NOT DETECTED

## 2023-09-07 VITALS — BODY MASS INDEX: 21.84 KG/M2 | WEIGHT: 104.06 LBS | HEIGHT: 58 IN

## 2023-09-08 VITALS — WEIGHT: 99.43 LBS | HEIGHT: 58 IN | BODY MASS INDEX: 20.87 KG/M2

## 2023-09-11 ENCOUNTER — TELEPHONE (OUTPATIENT)
Dept: PRIMARY CARE | Facility: CLINIC | Age: 28
End: 2023-09-11
Payer: COMMERCIAL

## 2023-09-11 NOTE — TELEPHONE ENCOUNTER
This pt is a mother of your pt and is requesting to speak with you in regards to her  [place].    Mom can be reached at [phone].

## 2023-09-14 NOTE — PROGRESS NOTES
Current Stage:   Stage: Triage     OB Dating:   EDC/EGA:  ·  Final TOMMY 2023   ·  EGA 27.1     Subjective Data:   Antepartum:  Vaginal Bleeding: No   Contractions/Abdominal Pain: Yes   Discharge/Loss of Fluid: No   Fetal Movement: Good   Fevers/Chills: No   Preeclampsia Symptoms: No   Antepartum:    26 yo  at 27.1 wga by LMP c/w 7.1 wga US who presents with contractions. She reports that her ctx started at 11:30. They have been happening every 15 minutes. She described  it as cramping similar to menstrual periods. Her pain is in her bilateral lower quadrants and is intermittent. She describes the pain as rotating sides of her abdomen.     denies lof, vaginal bleeding. Good fetal movement.     Pregnancy notable for:   - COVID positive    OBHx   in  c/b vacuum   GynHx: denies STD or abnormal pap smear   PMHx: asthma  PSHx: tonsilectomy  Social Hx: endorses marijuana use. denies t/e/d        Objective Information:    Objective Information:      T   P  R  BP   MAP  SpO2   Value  36.7  74  18  100/55   70  100%  Date/Time 3/23 16:27 3/23 21:07 3/23 16:27 3/23 21:07  3/23 21:07 3/23 16:27  Range  (36.7C - 36.7C )  (72 - 74 )  (18 - 18 )  (100 - 105 )/ (55 - 58 )  (70 - 75 )  (100% - 100% )      Pain reported at 3/23 18:30: 0 = None      Physical Exam:   Constitutional: Alert, conversational, well-appearing   Obstetric: Cervical Exam: 0/0/-3--> 0.5/0/-3--> 0.5/0/-3    FHT: 140, mod variability, +accels, -decels   [place]: irritable, occasional contractions   Eyes: Sclera white, EOM intact, no discharge or erythema   ENMT: No hearing deficit, no goiter present   Head/Neck: Normocephalic, atraumatic, full range  of motion intact   Respiratory/Thorax: No increased work of breathing,  no cough present, rate normal   Cardiovascular: No edema present   Gastrointestinal: Gravid, soft, nontender   Genitourinary: No suprapubic tenderness   Musculoskeletal: Strength +5 in all extremities bilaterally    Extremities: No calf tenderness, redness or warmth   Neurological: A/O x3, conversational   Breast: No redness or warmth   Psychological: Behavior appropriate, interactive   Skin: No rashes or lesions     Recent Lab Results:    Results:    CBC: 3/23/2023 17:10              \     Hgb     /                              \     10.1 L    /  WBC  ----------------  Plt               13.8 H    ----------------    254              /     Hct     \                              /     30.8 L    \            RBC: 3.33 L    MCV: 92       Coagulation: 3/23/2023 17:10  PT  /                    10.7  /  -------<    INR          ----------<      0.9  PTT\                    25 L \            Fibrinogen: 368            Testing:   NST Interpretation - Baby A:  ·  Baseline    ·  Variability moderate (amplitude range 6 to 25 bpm)   ·  Interpretation Appropriate for EGA (2 10x10 accels)   ·  Accelerations Present   ·  Decelerations Absent     Assessment and Plan:        Additional Dx:   27 weeks gestation of pregnancy: Entered Date: 23-Mar-2023  21:17   Non-stress test reactive: Entered Date: 23-Mar-2023 21:17   False labor: Entered Date: 23-Mar-2023 21:17    Assessment:    26 yo  at 27.1 wga by LMP c/w 7.1 wga US who presents with contractions    r/o PTL  - SVE 0/0/-3 -> repeat showed 0.5/0/-3; examined and confirmed with Dr Silva   - abruption labs were negative   - discussed PTL, FMCs, warning signs, when to return for eval, pt verb understanding    Maternal Well Being  - No acute distress  - Vitals stable and WNL    IUP at 27.1 wga  - prenatal lab work reviewed and unremarkable  - NST appropriate for gestational age  - GBS unknown    Staffed with Dr Hopper.    Khushboo Anne MD PGY-1   dochalo/siriera      Dr. Anne obtained HPI and discussed assessment/plan.  On additional 2hr recheck, pt was unchanged at 0.5/0/-3.  Signs of active labor were discussed.  All questions and concerns addressed.  Dr. Shirley reviewed  tracing and agrees with d/c home.    Cristine Landeros, MSN, APRN, FNP-C              Attestation:   Note Completion:  I am a:  Advanced Practice Provider   Attending Only - Shared Visit with Advanced Practice Provider This is a shared visit.  I have reviewed the Advanced Practice Provider?s encounter note, approve the Advanced Practice Provider?s documentation,  and provide the following additional information from my personal encounter.    Comments/ Additional Findings    pt seen and all questions answered          Electronic Signatures:  Mohini Shirley)  (Signed 23-Mar-2023 21:58)   Authored: Note Completion   Co-Signer: Current Stage, OB Dating, Subjective Data, Objective Data,  Testing, Assessment and Plan, Note Completion  Cristine Landeros (APRN-CNP)  (Signed 23-Mar-2023 21:21)   Authored: Current Stage, OB Dating, Subjective Data,  Objective Data,  Testing, Assessment and Plan, Note Completion      Last Updated: 23-Mar-2023 21:58 by Mohini Shirley)

## 2023-09-19 LAB
CALCIDIOL (25 OH VITAMIN D3) (NG/ML) IN SER/PLAS: 16 NG/ML
THYROTROPIN (MIU/L) IN SER/PLAS BY DETECTION LIMIT <= 0.05 MIU/L: 1.52 MIU/L (ref 0.44–3.98)

## 2024-02-15 ENCOUNTER — OFFICE VISIT (OUTPATIENT)
Dept: PRIMARY CARE | Facility: CLINIC | Age: 29
End: 2024-02-15
Payer: COMMERCIAL

## 2024-02-15 ENCOUNTER — LAB (OUTPATIENT)
Dept: LAB | Facility: LAB | Age: 29
End: 2024-02-15
Payer: COMMERCIAL

## 2024-02-15 VITALS
OXYGEN SATURATION: 98 % | SYSTOLIC BLOOD PRESSURE: 112 MMHG | TEMPERATURE: 97.6 F | RESPIRATION RATE: 18 BRPM | DIASTOLIC BLOOD PRESSURE: 64 MMHG | WEIGHT: 102 LBS | BODY MASS INDEX: 21.32 KG/M2 | HEART RATE: 88 BPM

## 2024-02-15 DIAGNOSIS — Z20.2 POSSIBLE EXPOSURE TO STD: Primary | ICD-10-CM

## 2024-02-15 DIAGNOSIS — Z20.2 POSSIBLE EXPOSURE TO STD: ICD-10-CM

## 2024-02-15 PROBLEM — F41.9 ANXIETY AND DEPRESSION: Status: ACTIVE | Noted: 2024-02-15

## 2024-02-15 PROBLEM — J45.20 MILD INTERMITTENT ASTHMA WITHOUT COMPLICATION (HHS-HCC): Status: ACTIVE | Noted: 2024-02-15

## 2024-02-15 PROBLEM — F32.A ANXIETY AND DEPRESSION: Status: ACTIVE | Noted: 2024-02-15

## 2024-02-15 LAB
HAV IGM SER QL: NONREACTIVE
HBV CORE IGM SER QL: NONREACTIVE
HBV SURFACE AG SERPL QL IA: NONREACTIVE
HCV AB SER QL: NONREACTIVE
HIV 1+2 AB+HIV1 P24 AG SERPL QL IA: NONREACTIVE
PREGNANCY TEST URINE, POC: NEGATIVE
TREPONEMA PALLIDUM IGG+IGM AB [PRESENCE] IN SERUM OR PLASMA BY IMMUNOASSAY: NONREACTIVE

## 2024-02-15 PROCEDURE — 86780 TREPONEMA PALLIDUM: CPT

## 2024-02-15 PROCEDURE — 87591 N.GONORRHOEAE DNA AMP PROB: CPT

## 2024-02-15 PROCEDURE — 80074 ACUTE HEPATITIS PANEL: CPT

## 2024-02-15 PROCEDURE — 87205 SMEAR GRAM STAIN: CPT

## 2024-02-15 PROCEDURE — 36415 COLL VENOUS BLD VENIPUNCTURE: CPT

## 2024-02-15 PROCEDURE — 81025 URINE PREGNANCY TEST: CPT | Performed by: FAMILY MEDICINE

## 2024-02-15 PROCEDURE — 1036F TOBACCO NON-USER: CPT | Performed by: FAMILY MEDICINE

## 2024-02-15 PROCEDURE — 87389 HIV-1 AG W/HIV-1&-2 AB AG IA: CPT

## 2024-02-15 PROCEDURE — 99214 OFFICE O/P EST MOD 30 MIN: CPT | Performed by: FAMILY MEDICINE

## 2024-02-15 PROCEDURE — 87661 TRICHOMONAS VAGINALIS AMPLIF: CPT

## 2024-02-15 PROCEDURE — 87529 HSV DNA AMP PROBE: CPT

## 2024-02-15 PROCEDURE — 87491 CHLMYD TRACH DNA AMP PROBE: CPT

## 2024-02-15 ASSESSMENT — ENCOUNTER SYMPTOMS
WHEEZING: 0
CONSTIPATION: 0
DYSURIA: 0
COUGH: 0
HEMATURIA: 0
DIARRHEA: 1
FEVER: 0
VOMITING: 0
BLOOD IN STOOL: 0
SORE THROAT: 0
NAUSEA: 0
SHORTNESS OF BREATH: 0

## 2024-02-15 NOTE — PROGRESS NOTES
Subjective   Patient ID: Dorothy Drummond is a 28 y.o. female who presents for Exposure to STD.    Exposure to STD   The patient's primary symptoms include a discharge. The patient's pertinent negatives include no dysuria. Primary symptoms comment: vaginal bleeding. This is a new problem. The current episode started yesterday. The problem has been unchanged. The vaginal discharge was bloody and yellow. Pertinent negatives include no fever or sore throat.        Review of Systems   Constitutional:  Negative for fever.   HENT:  Positive for congestion. Negative for ear pain and sore throat.    Respiratory:  Negative for cough, shortness of breath and wheezing.    Gastrointestinal:  Positive for diarrhea. Negative for blood in stool, constipation, nausea and vomiting.   Genitourinary:  Positive for vaginal discharge. Negative for dysuria, hematuria and vaginal bleeding.        Pt with possible exposure to sti.  Pt is sexually active, not on birth control.  Pt has 2 partners, both were new.  No hx of sti in the past  LMP=2/1/24 was normal       Objective   /64   Pulse 88   Temp 36.4 °C (97.6 °F) (Temporal)   Resp 18   Wt 46.3 kg (102 lb)   LMP 09/14/2022   SpO2 98%   BMI 21.32 kg/m²     Physical Exam  Vitals and nursing note reviewed.   Constitutional:       General: She is not in acute distress.     Appearance: Normal appearance.   HENT:      Head: Normocephalic and atraumatic.   Cardiovascular:      Rate and Rhythm: Normal rate and regular rhythm.      Heart sounds: Normal heart sounds.   Pulmonary:      Effort: Pulmonary effort is normal.      Breath sounds: Normal breath sounds.   Abdominal:      General: Abdomen is flat. Bowel sounds are normal.      Palpations: Abdomen is soft.   Skin:     General: Skin is warm and dry.   Neurological:      Mental Status: She is alert.         Assessment/Plan   Problem List Items Addressed This Visit             ICD-10-CM    Possible exposure to STD - Primary Z20.2      Advise pt we will test for sti  today  Will notify pt of any positive results and tx as needed  F/up if no improvement, may need to see gyn           Relevant Orders    Chlamydia Trachomatis, Amplified    HIV 1/2 Antigen/Antibody Screen with Reflex to Confirmation    HSV by PCR, Qualitative Whole Blood    Neisseria gonorrhoeae, Amplified    Syphilis Screen with Reflex    Trichomonas vaginalis, Nucleic Acid Detection    Vaginitis Gram Stain For Bacterial Vaginosis + Yeast    Hepatitis Panel, Acute    POCT pregnancy, urine manually resulted

## 2024-02-15 NOTE — ASSESSMENT & PLAN NOTE
Advise pt we will test for sti  today  Will notify pt of any positive results and tx as needed  F/up if no improvement, may need to see gyn

## 2024-02-16 LAB
C TRACH RRNA SPEC QL NAA+PROBE: POSITIVE
HSV1 DNA BLD QL NAA+PROBE: NOT DETECTED
HSV2 DNA BLD QL NAA+PROBE: NOT DETECTED
N GONORRHOEA DNA SPEC QL PROBE+SIG AMP: NEGATIVE
T VAGINALIS RRNA SPEC QL NAA+PROBE: NEGATIVE

## 2024-02-19 DIAGNOSIS — A74.9 CHLAMYDIA INFECTION: Primary | ICD-10-CM

## 2024-02-19 RX ORDER — DOXYCYCLINE 100 MG/1
100 CAPSULE ORAL 2 TIMES DAILY
Qty: 14 CAPSULE | Refills: 0 | Status: SHIPPED | OUTPATIENT
Start: 2024-02-19 | End: 2024-02-26

## 2024-02-21 LAB
CLUE CELLS VAG LPF-#/AREA: ABNORMAL /[LPF]
NUGENT SCORE: 0
YEAST VAG WET PREP-#/AREA: PRESENT

## 2024-02-22 DIAGNOSIS — B37.9 CANDIDA INFECTION: Primary | ICD-10-CM

## 2024-02-22 RX ORDER — FLUCONAZOLE 150 MG/1
150 TABLET ORAL
Qty: 2 TABLET | Refills: 0 | Status: SHIPPED | OUTPATIENT
Start: 2024-02-22

## 2024-03-06 ENCOUNTER — APPOINTMENT (OUTPATIENT)
Dept: RADIOLOGY | Facility: HOSPITAL | Age: 29
End: 2024-03-06
Payer: COMMERCIAL

## 2024-03-06 ENCOUNTER — HOSPITAL ENCOUNTER (EMERGENCY)
Facility: HOSPITAL | Age: 29
Discharge: HOME | End: 2024-03-06
Attending: EMERGENCY MEDICINE
Payer: COMMERCIAL

## 2024-03-06 VITALS
OXYGEN SATURATION: 100 % | RESPIRATION RATE: 16 BRPM | DIASTOLIC BLOOD PRESSURE: 64 MMHG | BODY MASS INDEX: 21.62 KG/M2 | TEMPERATURE: 98.1 F | HEIGHT: 58 IN | SYSTOLIC BLOOD PRESSURE: 102 MMHG | HEART RATE: 70 BPM | WEIGHT: 103 LBS

## 2024-03-06 DIAGNOSIS — M94.0 COSTOCHONDRITIS, ACUTE: Primary | ICD-10-CM

## 2024-03-06 PROCEDURE — 71046 X-RAY EXAM CHEST 2 VIEWS: CPT

## 2024-03-06 PROCEDURE — 96372 THER/PROPH/DIAG INJ SC/IM: CPT

## 2024-03-06 PROCEDURE — 2500000004 HC RX 250 GENERAL PHARMACY W/ HCPCS (ALT 636 FOR OP/ED): Performed by: EMERGENCY MEDICINE

## 2024-03-06 PROCEDURE — 71046 X-RAY EXAM CHEST 2 VIEWS: CPT | Performed by: STUDENT IN AN ORGANIZED HEALTH CARE EDUCATION/TRAINING PROGRAM

## 2024-03-06 PROCEDURE — 99283 EMERGENCY DEPT VISIT LOW MDM: CPT | Mod: 25

## 2024-03-06 RX ORDER — KETOROLAC TROMETHAMINE 10 MG/1
10 TABLET, FILM COATED ORAL EVERY 8 HOURS PRN
Qty: 12 TABLET | Refills: 0 | Status: SHIPPED | OUTPATIENT
Start: 2024-03-06 | End: 2024-03-11

## 2024-03-06 RX ORDER — KETOROLAC TROMETHAMINE 30 MG/ML
30 INJECTION, SOLUTION INTRAMUSCULAR; INTRAVENOUS ONCE
Status: COMPLETED | OUTPATIENT
Start: 2024-03-06 | End: 2024-03-06

## 2024-03-06 RX ORDER — METHYLPREDNISOLONE 4 MG/1
TABLET ORAL
Qty: 21 TABLET | Refills: 0 | Status: SHIPPED | OUTPATIENT
Start: 2024-03-06 | End: 2024-03-13

## 2024-03-06 RX ADMIN — KETOROLAC TROMETHAMINE 30 MG: 30 INJECTION, SOLUTION INTRAMUSCULAR at 11:36

## 2024-03-06 ASSESSMENT — PAIN SCALES - GENERAL
PAINLEVEL_OUTOF10: 8
PAINLEVEL_OUTOF10: 4

## 2024-03-06 ASSESSMENT — PAIN - FUNCTIONAL ASSESSMENT
PAIN_FUNCTIONAL_ASSESSMENT: 0-10
PAIN_FUNCTIONAL_ASSESSMENT: 0-10

## 2024-03-06 ASSESSMENT — PAIN DESCRIPTION - LOCATION: LOCATION: RIB CAGE

## 2024-03-06 ASSESSMENT — COLUMBIA-SUICIDE SEVERITY RATING SCALE - C-SSRS
6. HAVE YOU EVER DONE ANYTHING, STARTED TO DO ANYTHING, OR PREPARED TO DO ANYTHING TO END YOUR LIFE?: NO
2. HAVE YOU ACTUALLY HAD ANY THOUGHTS OF KILLING YOURSELF?: NO
1. IN THE PAST MONTH, HAVE YOU WISHED YOU WERE DEAD OR WISHED YOU COULD GO TO SLEEP AND NOT WAKE UP?: NO

## 2024-03-06 ASSESSMENT — PAIN DESCRIPTION - ORIENTATION: ORIENTATION: LEFT

## 2024-03-06 ASSESSMENT — LIFESTYLE VARIABLES
EVER HAD A DRINK FIRST THING IN THE MORNING TO STEADY YOUR NERVES TO GET RID OF A HANGOVER: NO
EVER FELT BAD OR GUILTY ABOUT YOUR DRINKING: NO
HAVE YOU EVER FELT YOU SHOULD CUT DOWN ON YOUR DRINKING: NO
HAVE PEOPLE ANNOYED YOU BY CRITICIZING YOUR DRINKING: NO

## 2024-03-06 ASSESSMENT — PAIN DESCRIPTION - PAIN TYPE: TYPE: ACUTE PAIN

## 2024-03-06 NOTE — ED PROVIDER NOTES
"HPI   Chief Complaint   Patient presents with    Rib Injury     Patient was playing with her daughter, picked her up and felt a \"pop\" on her left side.        Patient is a young 28-year-old female who is otherwise healthy was laying on bed and reached out to grab her [id number] daughter was trying to crawl out of bed and she felt a pop in her mid sternum and having pain in the mid sternum and down the left ribs, hurts to breathe hurts to move it feels better when she holds pressure against it                          [place] Coma Scale Score: 15                     Patient History   Past Medical History:   Diagnosis Date    Complete or unspecified spontaneous  without complication 2020    Complete spontaneous     Encounter for examination of ears and hearing without abnormal findings 2016    Encounter for hearing evaluation    Otitis media, unspecified, right ear 10/20/2020    Otitis media, right    Pain in unspecified ankle and joints of unspecified foot 2021    Ankle pain    Personal history of other (healed) physical injury and trauma 2021    History of sprain of ankle    Personal history of other diseases of the digestive system 2017    History of oral lesions    Personal history of other diseases of the digestive system 2020    History of dental abscess    Personal history of other diseases of the respiratory system 2020    History of pharyngitis     Past Surgical History:   Procedure Laterality Date    CT ABDOMEN PELVIS ANGIOGRAM W AND/OR WO IV CONTRAST  2019    CT ABDOMEN PELVIS ANGIOGRAM W AND/OR WO IV CONTRAST 2019 [facility] [place] ANCILLARY LEGACY    TONSILLECTOMY  2014    Tonsillectomy     No family history on file.  Social History     Tobacco Use    Smoking status: Never    Smokeless tobacco: Never   Substance Use Topics    Alcohol use: Not on file    Drug use: Not on file       Physical Exam   ED Triage Vitals [24 1133]   Temperature " Heart Rate Respirations BP   36.7 °C (98.1 °F) 82 16 97/54      Pulse Ox Temp Source Heart Rate Source Patient Position   100 % Temporal Monitor --      BP Location FiO2 (%)     -- --       Physical Exam  Vitals and nursing note reviewed. Exam conducted with a chaperone present.   Constitutional:       Appearance: Normal appearance.   HENT:      Head: Normocephalic and atraumatic.      Nose: Nose normal.      Mouth/Throat:      Mouth: Mucous membranes are moist.   Eyes:      Pupils: Pupils are equal, round, and reactive to light.   Cardiovascular:      Rate and Rhythm: Normal rate and regular rhythm.   Pulmonary:      Effort: Pulmonary effort is normal.      Comments: Tenderness to palpation lower part of the sternum and the left lower ribs, no crepitus no deformity  Chest:      Chest wall: Tenderness present.   Abdominal:      Palpations: Abdomen is soft.   Musculoskeletal:         General: Normal range of motion.      Cervical back: Normal range of motion.   Skin:     General: Skin is warm and dry.      Capillary Refill: Capillary refill takes less than 2 seconds.   Neurological:      General: No focal deficit present.      Mental Status: She is alert.   Psychiatric:         Mood and Affect: Mood normal.         ED Course & MDM   Diagnoses as of 03/06/24 1258   Costochondritis, acute       Medical Decision Making  My differential diagnosis  Acute costochondritis, acute rib strain, acute pneumothorax  Ordered a chest x-ray and IM Toradol for the patient.  Further workup management and based upon the results of the test ordered  Labs Reviewed - No data to display     XR chest 2 views   Final Result    No acute cardiopulmonary process.                MACRO:    None          Signed by: Caro Owens 3/6/2024 12:33 PM    Dictation workstation:   [id number]     Seen at this time was reevaluated, symptoms were significantly improved feels her pain is much better at this time patient was discharged home with a  prescription for Toradol and Medrol Dosepak and advised to follow-up with the family doctor as needed.  There was no pneumothorax no rib fractures, and then based upon the patient's history that she just reached out and felt a pop I did not do any further testing as there was no direct trauma involved.        Procedure  Procedures     Sierra Blandon MD  03/06/24 4719

## 2024-03-09 ENCOUNTER — HOSPITAL ENCOUNTER (EMERGENCY)
Facility: HOSPITAL | Age: 29
Discharge: HOME | End: 2024-03-09
Payer: COMMERCIAL

## 2024-03-09 VITALS
BODY MASS INDEX: 21.62 KG/M2 | SYSTOLIC BLOOD PRESSURE: 133 MMHG | HEART RATE: 77 BPM | RESPIRATION RATE: 17 BRPM | OXYGEN SATURATION: 100 % | DIASTOLIC BLOOD PRESSURE: 73 MMHG | WEIGHT: 103 LBS | TEMPERATURE: 98.2 F | HEIGHT: 58 IN

## 2024-03-09 DIAGNOSIS — Z20.2 ENCOUNTER FOR ASSESSMENT OF STD EXPOSURE: Primary | ICD-10-CM

## 2024-03-09 LAB — HCG UR QL IA.RAPID: NEGATIVE

## 2024-03-09 PROCEDURE — 96372 THER/PROPH/DIAG INJ SC/IM: CPT

## 2024-03-09 PROCEDURE — 87800 DETECT AGNT MULT DNA DIREC: CPT | Mod: ELYLAB | Performed by: PHYSICIAN ASSISTANT

## 2024-03-09 PROCEDURE — 99283 EMERGENCY DEPT VISIT LOW MDM: CPT | Performed by: PHYSICIAN ASSISTANT

## 2024-03-09 PROCEDURE — 81025 URINE PREGNANCY TEST: CPT | Performed by: PHYSICIAN ASSISTANT

## 2024-03-09 PROCEDURE — 2500000004 HC RX 250 GENERAL PHARMACY W/ HCPCS (ALT 636 FOR OP/ED): Performed by: PHYSICIAN ASSISTANT

## 2024-03-09 PROCEDURE — 2500000001 HC RX 250 WO HCPCS SELF ADMINISTERED DRUGS (ALT 637 FOR MEDICARE OP): Performed by: PHYSICIAN ASSISTANT

## 2024-03-09 RX ORDER — CEFTRIAXONE 500 MG/1
500 INJECTION, POWDER, FOR SOLUTION INTRAMUSCULAR; INTRAVENOUS ONCE
Status: COMPLETED | OUTPATIENT
Start: 2024-03-09 | End: 2024-03-09

## 2024-03-09 RX ORDER — DOXYCYCLINE 100 MG/1
100 TABLET ORAL 2 TIMES DAILY
Qty: 20 TABLET | Refills: 0 | Status: SHIPPED | OUTPATIENT
Start: 2024-03-10 | End: 2024-03-20

## 2024-03-09 RX ORDER — DOXYCYCLINE HYCLATE 100 MG
100 TABLET ORAL ONCE
Status: COMPLETED | OUTPATIENT
Start: 2024-03-09 | End: 2024-03-09

## 2024-03-09 RX ADMIN — CEFTRIAXONE SODIUM 500 MG: 500 INJECTION, POWDER, FOR SOLUTION INTRAMUSCULAR; INTRAVENOUS at 06:41

## 2024-03-09 RX ADMIN — DOXYCYCLINE HYCLATE 100 MG: 100 TABLET, FILM COATED ORAL at 06:41

## 2024-03-09 ASSESSMENT — PAIN SCALES - GENERAL: PAINLEVEL_OUTOF10: 0 - NO PAIN

## 2024-03-09 ASSESSMENT — PAIN - FUNCTIONAL ASSESSMENT: PAIN_FUNCTIONAL_ASSESSMENT: 0-10

## 2024-03-09 ASSESSMENT — LIFESTYLE VARIABLES
HAVE YOU EVER FELT YOU SHOULD CUT DOWN ON YOUR DRINKING: NO
EVER HAD A DRINK FIRST THING IN THE MORNING TO STEADY YOUR NERVES TO GET RID OF A HANGOVER: NO
EVER FELT BAD OR GUILTY ABOUT YOUR DRINKING: NO
HAVE PEOPLE ANNOYED YOU BY CRITICIZING YOUR DRINKING: NO

## 2024-03-09 ASSESSMENT — COLUMBIA-SUICIDE SEVERITY RATING SCALE - C-SSRS
2. HAVE YOU ACTUALLY HAD ANY THOUGHTS OF KILLING YOURSELF?: NO
1. IN THE PAST MONTH, HAVE YOU WISHED YOU WERE DEAD OR WISHED YOU COULD GO TO SLEEP AND NOT WAKE UP?: NO
6. HAVE YOU EVER DONE ANYTHING, STARTED TO DO ANYTHING, OR PREPARED TO DO ANYTHING TO END YOUR LIFE?: NO

## 2024-03-09 NOTE — ED PROVIDER NOTES
"HPI   Chief Complaint   Patient presents with    Exposure to STD     \"I was diagnosed with an STD and treated but her partner didn't take his medication.\"       A 28-year-old female patient comes in the emergency department today concern for STD.  She denies any vaginal discharge, dysuria, fevers, chills.  States she is asymptomatic but her sexual partner has dysuria.  They believe it is secondary to chlamydia.  For this purpose they come to the emergency department today for further evaluation.                          [place] Coma Scale Score: 15                     Patient History   Past Medical History:   Diagnosis Date    Complete or unspecified spontaneous  without complication 2020    Complete spontaneous     Encounter for examination of ears and hearing without abnormal findings 2016    Encounter for hearing evaluation    Otitis media, unspecified, right ear 10/20/2020    Otitis media, right    Pain in unspecified ankle and joints of unspecified foot 2021    Ankle pain    Personal history of other (healed) physical injury and trauma 2021    History of sprain of ankle    Personal history of other diseases of the digestive system 2017    History of oral lesions    Personal history of other diseases of the digestive system 2020    History of dental abscess    Personal history of other diseases of the respiratory system 2020    History of pharyngitis     Past Surgical History:   Procedure Laterality Date    CT ABDOMEN PELVIS ANGIOGRAM W AND/OR WO IV CONTRAST  2019    CT ABDOMEN PELVIS ANGIOGRAM W AND/OR WO IV CONTRAST 2019 [facility] [place] ANCILLARY LEGACY    TONSILLECTOMY  2014    Tonsillectomy     No family history on file.  Social History     Tobacco Use    Smoking status: Never    Smokeless tobacco: Never   Substance Use Topics    Alcohol use: Not on file    Drug use: Not on file       Physical Exam   ED Triage Vitals [24 0547]   Temperature " Heart Rate Respirations BP   36.8 °C (98.2 °F) 77 17 133/73      Pulse Ox Temp Source Heart Rate Source Patient Position   100 % Temporal Monitor Sitting      BP Location FiO2 (%)     -- --       Physical Exam  Constitutional:       Appearance: Normal appearance.   HENT:      Head: Normocephalic and atraumatic.      Nose: Nose normal.   Eyes:      Extraocular Movements: Extraocular movements intact.      Conjunctiva/sclera: Conjunctivae normal.      Pupils: Pupils are equal, round, and reactive to light.   Cardiovascular:      Rate and Rhythm: Normal rate and regular rhythm.   Pulmonary:      Effort: Pulmonary effort is normal. No respiratory distress.      Breath sounds: Normal breath sounds. No stridor. No wheezing.   Abdominal:      General: Abdomen is flat.      Palpations: Abdomen is soft.      Tenderness: There is no right CVA tenderness or left CVA tenderness.   Musculoskeletal:         General: Normal range of motion.      Cervical back: Normal range of motion.   Skin:     General: Skin is warm and dry.   Neurological:      General: No focal deficit present.      Mental Status: She is alert and oriented to person, place, and time. Mental status is at baseline.   Psychiatric:         Mood and Affect: Mood normal.         ED Course & MDM   Diagnoses as of 03/09/24 0629   Encounter for assessment of STD exposure       Medical Decision Making  A 28-year-old female patient comes in the emergency department today concern for STD.  She denies any vaginal discharge, dysuria, fevers, chills.  States she is asymptomatic but her sexual partner has dysuria.  They believe it is secondary to chlamydia.  For this purpose they come to the emergency department today for further evaluation.    Patient tested for common STDs here in the emergency department will be given IM Rocephin and p.o. doxycycline and discharged home on p.o. doxycycline.  Offered patient a work note patient declined.  Will discharge patient home at this  time.  Patient agrees with this plan.    Historians the patient    Diagnosis: Exposure to STD        Procedure  Procedures     Lenard Rodríguez PA-C  03/09/24 0629

## 2024-03-10 LAB
C TRACH RRNA SPEC QL NAA+PROBE: POSITIVE
N GONORRHOEA DNA SPEC QL PROBE+SIG AMP: NEGATIVE

## 2024-03-11 ENCOUNTER — TELEPHONE (OUTPATIENT)
Dept: PHARMACY | Facility: HOSPITAL | Age: 29
End: 2024-03-11
Payer: COMMERCIAL

## 2024-03-11 NOTE — PROGRESS NOTES
EDPD Note: Dose Change    Contacted Dorothy Drummond regarding positive chlamydia result that was taken during their recent emergency room visit. I completed education with  patient . The patient is being treated with the proper medication; however, the dose of the discharge prescription is incorrect. I gave verbal education about the new medication dose found below. No further follow up needed from EDPD Team.     Patient presented to the ED with concerns for and STD after partner did not take medication after last infection. She was discharged with doxycycline 100 mg BID x 10 days which is appropriate however instructed her to decrease down to 7 days which would be adequate coverage.     Drug: Doxycycline 100 mg   Sig: Take 1 tablet PO BID   Days Supply: 7         Component  Ref Range & Units    Neisseria gonorrhea,Amplified  Negative Negative   Chlamydia trachomatis, Amplified  Negative Positive Abnormal           Jody Prasad, PharmD

## 2024-08-16 ENCOUNTER — APPOINTMENT (OUTPATIENT)
Dept: OBSTETRICS AND GYNECOLOGY | Facility: CLINIC | Age: 29
End: 2024-08-16
Payer: COMMERCIAL

## 2024-08-29 ENCOUNTER — TELEPHONE (OUTPATIENT)
Dept: OBSTETRICS AND GYNECOLOGY | Facility: CLINIC | Age: 29
End: 2024-08-29
Payer: COMMERCIAL

## 2024-08-29 NOTE — TELEPHONE ENCOUNTER
Patient has NOB scheduled with you on 9/6, having nausea and vomiting and is requesting nausea medication to be sent to [facility] in her chart. Advised OTC remedies as well. Please advise.   
142

## 2024-09-06 ENCOUNTER — LAB (OUTPATIENT)
Dept: LAB | Facility: LAB | Age: 29
End: 2024-09-06
Payer: COMMERCIAL

## 2024-09-06 ENCOUNTER — APPOINTMENT (OUTPATIENT)
Dept: OBSTETRICS AND GYNECOLOGY | Facility: CLINIC | Age: 29
End: 2024-09-06
Payer: COMMERCIAL

## 2024-09-06 VITALS — DIASTOLIC BLOOD PRESSURE: 63 MMHG | BODY MASS INDEX: 20.36 KG/M2 | WEIGHT: 97.4 LBS | SYSTOLIC BLOOD PRESSURE: 107 MMHG

## 2024-09-06 DIAGNOSIS — O99.340 DEPRESSION AFFECTING PREGNANCY (HHS-HCC): ICD-10-CM

## 2024-09-06 DIAGNOSIS — Z34.91 FIRST TRIMESTER PREGNANCY (HHS-HCC): ICD-10-CM

## 2024-09-06 DIAGNOSIS — Z34.91 PRENATAL CARE IN FIRST TRIMESTER (HHS-HCC): ICD-10-CM

## 2024-09-06 DIAGNOSIS — Z3A.10 10 WEEKS GESTATION OF PREGNANCY (HHS-HCC): Primary | ICD-10-CM

## 2024-09-06 DIAGNOSIS — F32.A DEPRESSION AFFECTING PREGNANCY (HHS-HCC): ICD-10-CM

## 2024-09-06 PROBLEM — Z20.2 POSSIBLE EXPOSURE TO STD: Status: RESOLVED | Noted: 2024-02-15 | Resolved: 2024-09-06

## 2024-09-06 LAB
ABO GROUP (TYPE) IN BLOOD: NORMAL
ANTIBODY SCREEN: NORMAL
ERYTHROCYTE [DISTWIDTH] IN BLOOD BY AUTOMATED COUNT: 13.1 % (ref 11.5–14.5)
HCT VFR BLD AUTO: 38 % (ref 36–46)
HGB BLD-MCNC: 12.7 G/DL (ref 12–16)
MCH RBC QN AUTO: 30.1 PG (ref 26–34)
MCHC RBC AUTO-ENTMCNC: 33.4 G/DL (ref 32–36)
MCV RBC AUTO: 90 FL (ref 80–100)
NRBC BLD-RTO: 0 /100 WBCS (ref 0–0)
PLATELET # BLD AUTO: 275 X10*3/UL (ref 150–450)
PREGNANCY TEST URINE, POC: POSITIVE
RBC # BLD AUTO: 4.22 X10*6/UL (ref 4–5.2)
RH FACTOR (ANTIGEN D): NORMAL
WBC # BLD AUTO: 12.6 X10*3/UL (ref 4.4–11.3)

## 2024-09-06 PROCEDURE — 86900 BLOOD TYPING SEROLOGIC ABO: CPT

## 2024-09-06 PROCEDURE — 87340 HEPATITIS B SURFACE AG IA: CPT

## 2024-09-06 PROCEDURE — 85027 COMPLETE CBC AUTOMATED: CPT

## 2024-09-06 PROCEDURE — 36415 COLL VENOUS BLD VENIPUNCTURE: CPT

## 2024-09-06 PROCEDURE — 86317 IMMUNOASSAY INFECTIOUS AGENT: CPT

## 2024-09-06 PROCEDURE — 87086 URINE CULTURE/COLONY COUNT: CPT

## 2024-09-06 PROCEDURE — 83036 HEMOGLOBIN GLYCOSYLATED A1C: CPT

## 2024-09-06 PROCEDURE — 87389 HIV-1 AG W/HIV-1&-2 AB AG IA: CPT

## 2024-09-06 PROCEDURE — 86803 HEPATITIS C AB TEST: CPT

## 2024-09-06 PROCEDURE — 86901 BLOOD TYPING SEROLOGIC RH(D): CPT

## 2024-09-06 PROCEDURE — 86850 RBC ANTIBODY SCREEN: CPT

## 2024-09-06 PROCEDURE — 86780 TREPONEMA PALLIDUM: CPT

## 2024-09-06 ASSESSMENT — EDINBURGH POSTNATAL DEPRESSION SCALE (EPDS)
I HAVE BEEN ANXIOUS OR WORRIED FOR NO GOOD REASON: YES, SOMETIMES
I HAVE BLAMED MYSELF UNNECESSARILY WHEN THINGS WENT WRONG: YES, SOME OF THE TIME
I HAVE BEEN SO UNHAPPY THAT I HAVE BEEN CRYING: ONLY OCCASIONALLY
I HAVE FELT SCARED OR PANICKY FOR NO GOOD REASON: YES, QUITE A LOT
I HAVE BEEN SO UNHAPPY THAT I HAVE HAD DIFFICULTY SLEEPING: YES, SOMETIMES
THE THOUGHT OF HARMING MYSELF HAS OCCURRED TO ME: SOMETIMES
I HAVE LOOKED FORWARD WITH ENJOYMENT TO THINGS: AS MUCH AS I EVER DID
I HAVE BEEN ABLE TO LAUGH AND SEE THE FUNNY SIDE OF THINGS: NOT QUITE SO MUCH NOW
THINGS HAVE BEEN GETTING ON TOP OF ME: YES, SOMETIMES I HAVEN'T BEEN COPING AS WELL AS USUAL
TOTAL SCORE: 17
I HAVE FELT SAD OR MISERABLE: YES, QUITE OFTEN

## 2024-09-06 ASSESSMENT — PATIENT HEALTH QUESTIONNAIRE - PHQ9
2. FEELING DOWN, DEPRESSED OR HOPELESS: NOT AT ALL
SUM OF ALL RESPONSES TO PHQ9 QUESTIONS 1 & 2: 0
1. LITTLE INTEREST OR PLEASURE IN DOING THINGS: NOT AT ALL

## 2024-09-06 NOTE — PROGRESS NOTES
Subjective   Dorothy Drummond is a 28 y.o.  at Unknown with a working estimated date of delivery of Not found. who presents for an initial prenatal visit. This pregnancy is unplanned, though patient is accepting.   Occupation: Builds [employer]  Partner: Vinny, builder   Children with other partner: 2        Born healthy: yes  Feels safe at home: Yes  Previous  section No  Patient currently experiencing: n/v. Using vitamin B6. Discussed with patient ways to alleviate n/v. Discussed use of peppermint and irwin candies or tea, eating small frequent carb heavy meals, avoiding spicy and high fat foods, and eating carbohydrates about 30 minutes before rising for the day. Patient educated on acupressure for relief of nausea with sea bands. Patient instructed to begin taking a vitamin b6 25 mg tid with the addition of  25 mg Unisom at HS. Patient instructed that if she continues to suffer from daytime nausea and vomiting she may take an additional dose of 12.5 mg of Unisom. Patient encouraged to call office or emergency line if symptoms do not improve or worsen.   Taking prenatal vitamin: Yes  Dating: By LMP    Pregravid BMI: 19.86  Normal weight, BMI 18.5-<24.9, discussed recommended weight gain of 25-35#    Preeclampsia risk:  History of hypertension:  No  ASA prophylaxis: n/a  Ob HX: 2014, 39 wks, VAVB, MLE, male, 7# 8oz, epidural, no other complications  3: 39, NSVB, female, 6# 5 oz, no complications  PMH:mild intermittent asthma, never hospitalized  Hx of metal health disorders: PPD, Anxiety. EPDS 17, sees psych and [facility].  Patient reports thoughts of sometimes wanting to harm herself.  Patient does not report wanting to hurt herself today.  Patient reports overall doing well but that this last year has been hard  since the passing of her . Patient currently on Prosac 20 mg daily. We discussed the management of depression and anxiety during pregnancyWe  discussed that third trimester use of SSRIs in pregnancy may have implications. We reviewed the risk of  abstinence syndrome, but emphasized that this syndrome can be easily managed by the Pediatricians, and no long term effects have been demonstrated. We also discussed the potential association with persistent pulmonary hypertension of the  (PPHN). There may be a small increase in the risk of PPHN among those who use SSRIs in late pregnancy, however about 99% of women exposed to one of these medications in late pregnancy will deliver an infant unaffected by PPHN. We discussed that the absolute risks of continuing SSRIs in pregnancy are small but present and need to be weighed against the potential maternal benefits.  PSHX: tonsillectomy   SH: patient denies use of drugs, alcohol, or tobacco.   Pap HX: 2022 ASCUS hpv neg, for pap with PE    Covid vaccine: had and agreeable to booster  Flu vaccine: agreeable to vaccine    OB History    Para Term  AB Living   4 2 2   1 2   SAB IAB Ectopic Multiple Live Births   1              # Outcome Date GA Lbr Paramjit/2nd Weight Sex Type Anes PTL Lv   4 Current            3 Term            2 SAB            1 Term              [place]  Depression Scale Total: 17  Prior pregnancy complications: anemia  Medical Problems       Problem List       Mild intermittent asthma without complication ([facility])    Anxiety and depression    Possible exposure to STD          Objective   Weight: (!) 44.2 kg (97 lb 6.4 oz)  TW.089 kg (2 lb 6.4 oz)   Expected Total Weight Gain: 11.5 kg (25 lb)-16 kg (35 lb)   Pregravid BMI: 19.86  BP: 107/63  Pregnancy Problems (from 24 to present)       No problems associated with this episode.             Assessment /Plan     Genetic testing: The patient was counseled regarding prenatal genetic testing options and was provided literature in the obstetric folder. First line screening options, including cfDNA and first  trimester ultrasound for nuchal translucency, were discussed and offered.  Benefits, drawbacks, and limitations were explained respectively.  It was clearly stated that only diagnostic testing via CVS or amniocentesis provides definitive information regarding a genetic diagnosis in the fetus (risk of complications with CVS 1/200, risk of fetal loss with CVS 1/400; risk of complications with amniocentesis 1/400 and a risk of fetal loss with amniocentesis 1/1000). It was discussed with the patient that the false positive rates for NIPT is higher for women under 35 years of age. It was encouraged that patient's with high risk personal/family history be referred to genetics for additional screening and counselling. This patient has decided to pursue NIPS and NT scan    Education provided r/t nutrition, prenatal vitamins, folic acid supplementation, dietary guidelines, exercise, smoking, alcohol, caffeine and drug use. Healthy weight gain in pregnancy discussed.     Physical activity -patient encouraged to be physically active throughout pregnancy to promote healthy weight gain.     Safety- Discussed with patient that she may use Tylenol as a pain reliever, but to avoid the use of NSAIDS. Patient informed of safe seat belt use, avoidance of hot tubs and saunas, and when to stop air travel.     Dental health- patient encouraged to maintain good dental health through pregnancy with routine dental screening.     Infant feeding- discussed patient's infant feeding preferences and strongly encouraged breastfeeding     Patient is appropriate for midwifery care. Patient oriented to practice, collaborative practice model, and educated on visit frequency    Warning s/s discussed and SAB precautions reviewed.   RTC 4wks/prn -    NOB plan:   Pap and PE at next visit. Add gc/ct  All new OB labs  today including  NIPS  Patient to schedule NT scan  Continue PNV daily  Offer flu vaccine at next visit.   Maida Kaye, SAMIRA-EZRA

## 2024-09-07 LAB
EST. AVERAGE GLUCOSE BLD GHB EST-MCNC: 97 MG/DL
HBA1C MFR BLD: 5 %
HBV SURFACE AG SERPL QL IA: NONREACTIVE
HCV AB SER QL: NONREACTIVE
HIV 1+2 AB+HIV1 P24 AG SERPL QL IA: NONREACTIVE
REFLEX ADDED, ANEMIA PANEL: NORMAL
RUBV IGG SERPL IA-ACNC: 2.9 IA
RUBV IGG SERPL QL IA: POSITIVE
TREPONEMA PALLIDUM IGG+IGM AB [PRESENCE] IN SERUM OR PLASMA BY IMMUNOASSAY: NONREACTIVE

## 2024-09-08 LAB — BACTERIA UR CULT: NO GROWTH

## 2024-09-16 ENCOUNTER — TELEPHONE (OUTPATIENT)
Dept: OBSTETRICS AND GYNECOLOGY | Facility: CLINIC | Age: 29
End: 2024-09-16

## 2024-09-16 ENCOUNTER — HOSPITAL ENCOUNTER (EMERGENCY)
Facility: HOSPITAL | Age: 29
Discharge: HOME | End: 2024-09-16
Payer: COMMERCIAL

## 2024-09-16 VITALS
RESPIRATION RATE: 18 BRPM | DIASTOLIC BLOOD PRESSURE: 59 MMHG | OXYGEN SATURATION: 98 % | HEART RATE: 89 BPM | SYSTOLIC BLOOD PRESSURE: 124 MMHG | HEIGHT: 58 IN | TEMPERATURE: 98.2 F | WEIGHT: 95 LBS | BODY MASS INDEX: 19.94 KG/M2

## 2024-09-16 DIAGNOSIS — N10 ACUTE PYELONEPHRITIS: Primary | ICD-10-CM

## 2024-09-16 LAB
ALBUMIN SERPL BCP-MCNC: 4 G/DL (ref 3.4–5)
ALP SERPL-CCNC: 47 U/L (ref 33–110)
ALT SERPL W P-5'-P-CCNC: 10 U/L (ref 7–45)
ANION GAP SERPL CALC-SCNC: 12 MMOL/L (ref 10–20)
APPEARANCE UR: ABNORMAL
AST SERPL W P-5'-P-CCNC: 16 U/L (ref 9–39)
BACTERIA #/AREA URNS AUTO: ABNORMAL /HPF
BASOPHILS # BLD AUTO: 0.03 X10*3/UL (ref 0–0.1)
BASOPHILS NFR BLD AUTO: 0.2 %
BILIRUB SERPL-MCNC: 0.4 MG/DL (ref 0–1.2)
BILIRUB UR STRIP.AUTO-MCNC: NEGATIVE MG/DL
BUN SERPL-MCNC: 7 MG/DL (ref 6–23)
CALCIUM SERPL-MCNC: 9.2 MG/DL (ref 8.6–10.3)
CHLORIDE SERPL-SCNC: 105 MMOL/L (ref 98–107)
CO2 SERPL-SCNC: 22 MMOL/L (ref 21–32)
COLOR UR: YELLOW
CREAT SERPL-MCNC: 0.46 MG/DL (ref 0.5–1.05)
EGFRCR SERPLBLD CKD-EPI 2021: >90 ML/MIN/1.73M*2
EOSINOPHIL # BLD AUTO: 0.08 X10*3/UL (ref 0–0.7)
EOSINOPHIL NFR BLD AUTO: 0.7 %
ERYTHROCYTE [DISTWIDTH] IN BLOOD BY AUTOMATED COUNT: 12.8 % (ref 11.5–14.5)
GLUCOSE SERPL-MCNC: 83 MG/DL (ref 74–99)
GLUCOSE UR STRIP.AUTO-MCNC: NORMAL MG/DL
HCT VFR BLD AUTO: 35.2 % (ref 36–46)
HGB BLD-MCNC: 11.9 G/DL (ref 12–16)
HOLD SPECIMEN: NORMAL
HOLD SPECIMEN: NORMAL
IMM GRANULOCYTES # BLD AUTO: 0.04 X10*3/UL (ref 0–0.7)
IMM GRANULOCYTES NFR BLD AUTO: 0.3 % (ref 0–0.9)
KETONES UR STRIP.AUTO-MCNC: ABNORMAL MG/DL
LACTATE SERPL-SCNC: 0.9 MMOL/L (ref 0.4–2)
LEUKOCYTE ESTERASE UR QL STRIP.AUTO: ABNORMAL
LYMPHOCYTES # BLD AUTO: 2.53 X10*3/UL (ref 1.2–4.8)
LYMPHOCYTES NFR BLD AUTO: 20.6 %
MCH RBC QN AUTO: 29.8 PG (ref 26–34)
MCHC RBC AUTO-ENTMCNC: 33.8 G/DL (ref 32–36)
MCV RBC AUTO: 88 FL (ref 80–100)
MONOCYTES # BLD AUTO: 0.71 X10*3/UL (ref 0.1–1)
MONOCYTES NFR BLD AUTO: 5.8 %
MUCOUS THREADS #/AREA URNS AUTO: ABNORMAL /LPF
NEUTROPHILS # BLD AUTO: 8.88 X10*3/UL (ref 1.2–7.7)
NEUTROPHILS NFR BLD AUTO: 72.4 %
NITRITE UR QL STRIP.AUTO: NEGATIVE
NRBC BLD-RTO: 0 /100 WBCS (ref 0–0)
PH UR STRIP.AUTO: 6 [PH]
PLATELET # BLD AUTO: 258 X10*3/UL (ref 150–450)
POTASSIUM SERPL-SCNC: 3.6 MMOL/L (ref 3.5–5.3)
PROT SERPL-MCNC: 7.3 G/DL (ref 6.4–8.2)
PROT UR STRIP.AUTO-MCNC: ABNORMAL MG/DL
RBC # BLD AUTO: 3.99 X10*6/UL (ref 4–5.2)
RBC # UR STRIP.AUTO: NEGATIVE /UL
RBC #/AREA URNS AUTO: ABNORMAL /HPF
SODIUM SERPL-SCNC: 135 MMOL/L (ref 136–145)
SP GR UR STRIP.AUTO: 1.03
SQUAMOUS #/AREA URNS AUTO: ABNORMAL /HPF
UROBILINOGEN UR STRIP.AUTO-MCNC: NORMAL MG/DL
WBC # BLD AUTO: 12.3 X10*3/UL (ref 4.4–11.3)
WBC #/AREA URNS AUTO: ABNORMAL /HPF

## 2024-09-16 PROCEDURE — 83605 ASSAY OF LACTIC ACID: CPT | Performed by: PHYSICIAN ASSISTANT

## 2024-09-16 PROCEDURE — 87086 URINE CULTURE/COLONY COUNT: CPT | Mod: ELYLAB | Performed by: PHYSICIAN ASSISTANT

## 2024-09-16 PROCEDURE — 81003 URINALYSIS AUTO W/O SCOPE: CPT | Performed by: PHYSICIAN ASSISTANT

## 2024-09-16 PROCEDURE — 99283 EMERGENCY DEPT VISIT LOW MDM: CPT

## 2024-09-16 PROCEDURE — 81001 URINALYSIS AUTO W/SCOPE: CPT | Performed by: PHYSICIAN ASSISTANT

## 2024-09-16 PROCEDURE — 36415 COLL VENOUS BLD VENIPUNCTURE: CPT | Performed by: PHYSICIAN ASSISTANT

## 2024-09-16 PROCEDURE — 96360 HYDRATION IV INFUSION INIT: CPT

## 2024-09-16 PROCEDURE — 85025 COMPLETE CBC W/AUTO DIFF WBC: CPT | Performed by: PHYSICIAN ASSISTANT

## 2024-09-16 PROCEDURE — 2500000004 HC RX 250 GENERAL PHARMACY W/ HCPCS (ALT 636 FOR OP/ED): Performed by: PHYSICIAN ASSISTANT

## 2024-09-16 PROCEDURE — 80053 COMPREHEN METABOLIC PANEL: CPT | Performed by: PHYSICIAN ASSISTANT

## 2024-09-16 RX ORDER — ONDANSETRON 4 MG/1
4 TABLET, ORALLY DISINTEGRATING ORAL EVERY 8 HOURS PRN
Qty: 15 TABLET | Refills: 0 | Status: SHIPPED | OUTPATIENT
Start: 2024-09-16 | End: 2024-09-21

## 2024-09-16 RX ORDER — CEFPODOXIME PROXETIL 200 MG/1
200 TABLET, FILM COATED ORAL 2 TIMES DAILY
Qty: 20 TABLET | Refills: 0 | Status: SHIPPED | OUTPATIENT
Start: 2024-09-16 | End: 2024-09-26

## 2024-09-16 ASSESSMENT — COLUMBIA-SUICIDE SEVERITY RATING SCALE - C-SSRS
6. HAVE YOU EVER DONE ANYTHING, STARTED TO DO ANYTHING, OR PREPARED TO DO ANYTHING TO END YOUR LIFE?: YES
2. HAVE YOU ACTUALLY HAD ANY THOUGHTS OF KILLING YOURSELF?: NO
6. HAVE YOU EVER DONE ANYTHING, STARTED TO DO ANYTHING, OR PREPARED TO DO ANYTHING TO END YOUR LIFE?: NO
1. IN THE PAST MONTH, HAVE YOU WISHED YOU WERE DEAD OR WISHED YOU COULD GO TO SLEEP AND NOT WAKE UP?: NO

## 2024-09-16 ASSESSMENT — PAIN DESCRIPTION - PAIN TYPE: TYPE: ACUTE PAIN

## 2024-09-16 ASSESSMENT — PAIN - FUNCTIONAL ASSESSMENT: PAIN_FUNCTIONAL_ASSESSMENT: 0-10

## 2024-09-16 ASSESSMENT — LIFESTYLE VARIABLES
EVER FELT BAD OR GUILTY ABOUT YOUR DRINKING: NO
HAVE PEOPLE ANNOYED YOU BY CRITICIZING YOUR DRINKING: NO
EVER HAD A DRINK FIRST THING IN THE MORNING TO STEADY YOUR NERVES TO GET RID OF A HANGOVER: NO
TOTAL SCORE: 0
HAVE YOU EVER FELT YOU SHOULD CUT DOWN ON YOUR DRINKING: NO

## 2024-09-16 ASSESSMENT — PAIN DESCRIPTION - DIRECTION: RADIATING_TOWARDS: RIGHT LEG

## 2024-09-16 ASSESSMENT — PAIN DESCRIPTION - LOCATION
LOCATION_2: HEAD
LOCATION: BACK

## 2024-09-16 ASSESSMENT — PAIN SCALES - GENERAL
PAINLEVEL_OUTOF10: 6
PAINLEVEL_OUTOF10: 6

## 2024-09-16 ASSESSMENT — PAIN DESCRIPTION - ORIENTATION: ORIENTATION: LOWER

## 2024-09-16 NOTE — ED PROVIDER NOTES
HPI   Chief Complaint   Patient presents with    Back Pain     Sent here by OB for lower back pain and dehydration.        A 28-year-old female patient roughly 12 weeks pregnant comes in the emergency department today at the request of an OB.  States her OB wanted to have her get some IV fluids and check a urine to rule out possible kidney infection she has pain in her right flank.  She rates pain a 6 out of 10 on the pain scale.  Denies any associated fevers, chills, nausea, vomiting.  She otherwise has no other complaints at this present time for this purpose comes in the emergency department today for further evaluation.              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) 2020    Complete spontaneous     Depression     Encounter for examination of ears and hearing without abnormal findings 2016    Encounter for hearing evaluation    Female infertility     Otitis media, unspecified, right ear 10/20/2020    Otitis media, right    Pain in unspecified ankle and joints of unspecified foot 2021    Ankle pain    Personal history of other (healed) physical injury and trauma 2021    History of sprain of ankle    Personal history of other diseases of the digestive system 2017    History of oral lesions    Personal history of other diseases of the digestive system 2020    History of dental abscess    Personal history of other diseases of the respiratory system 2020    History of pharyngitis    Possible exposure to STD 02/15/2024     Past Surgical History:   Procedure Laterality Date    CT ABDOMEN PELVIS ANGIOGRAM W AND/OR WO IV CONTRAST  2019    CT ABDOMEN PELVIS ANGIOGRAM W AND/OR WO IV CONTRAST 2019 [facility]    TONSILLECTOMY  2014    Tonsillectomy     No family history on file.  Social History     Tobacco Use    Smoking status: Never     Smokeless tobacco: Never   Vaping Use    Vaping status: Never Used   Substance Use Topics    Alcohol use: Not Currently    Drug use: Never       Physical Exam   ED Triage Vitals [09/16/24 1521]   Temperature Heart Rate Respirations BP   36.8 °C (98.2 °F) 71 18 124/59      Pulse Ox Temp src Heart Rate Source Patient Position   100 % -- -- --      BP Location FiO2 (%)     Left arm --       Physical Exam  Constitutional:       General: She is in acute distress.      Appearance: Normal appearance. She is not ill-appearing.   HENT:      Head: Normocephalic and atraumatic.      Nose: Nose normal.   Eyes:      Extraocular Movements: Extraocular movements intact.      Conjunctiva/sclera: Conjunctivae normal.      Pupils: Pupils are equal, round, and reactive to light.   Cardiovascular:      Rate and Rhythm: Normal rate and regular rhythm.   Pulmonary:      Effort: Pulmonary effort is normal. No respiratory distress.      Breath sounds: Normal breath sounds. No stridor. No wheezing.   Musculoskeletal:         General: Normal range of motion.      Cervical back: Normal range of motion.   Skin:     General: Skin is warm and dry.   Neurological:      General: No focal deficit present.      Mental Status: She is alert and oriented to person, place, and time. Mental status is at baseline.   Psychiatric:         Mood and Affect: Mood normal.           ED Course & MDM   Diagnoses as of 09/16/24 1733   Acute pyelonephritis                 No data recorded     Blunt Coma Scale Score: 15 (09/16/24 1530 : Td Lux RN)                           Medical Decision Making  A 28-year-old female patient roughly 12 weeks pregnant comes in the emergency department today at the request of an OB.  States her OB wanted to have her get some IV fluids and check a urine to rule out possible kidney infection she has pain in her right flank.  She rates pain a 6 out of 10 on the pain scale.  Denies any associated fevers, chills, nausea,  vomiting.  She otherwise has no other complaints at this present time for this purpose comes in the emergency department today for further evaluation.    Basic laboratory studies ordered, urinalysis, IV fluids.    Patient's urine has leukocytes bacteria and white blood cells, normal renal function, white blood count 12.3 absolute neutrophil count 8.8.  Concerning for pyelonephritis.  Will give patient first dose of p.o. antibiotics here as well as for home.  Patient agrees with this plan expressed verbal understanding.  All questions were answered.    Historian is the patient    Diagnosis: Acute pyelonephritis      Labs Reviewed   CBC WITH AUTO DIFFERENTIAL - Abnormal       Result Value    WBC 12.3 (*)     nRBC 0.0      RBC 3.99 (*)     Hemoglobin 11.9 (*)     Hematocrit 35.2 (*)     MCV 88      MCH 29.8      MCHC 33.8      RDW 12.8      Platelets 258      Neutrophils % 72.4      Immature Granulocytes %, Automated 0.3      Lymphocytes % 20.6      Monocytes % 5.8      Eosinophils % 0.7      Basophils % 0.2      Neutrophils Absolute 8.88 (*)     Immature Granulocytes Absolute, Automated 0.04      Lymphocytes Absolute 2.53      Monocytes Absolute 0.71      Eosinophils Absolute 0.08      Basophils Absolute 0.03     COMPREHENSIVE METABOLIC PANEL - Abnormal    Glucose 83      Sodium 135 (*)     Potassium 3.6      Chloride 105      Bicarbonate 22      Anion Gap 12      Urea Nitrogen 7      Creatinine 0.46 (*)     eGFR >90      Calcium 9.2      Albumin 4.0      Alkaline Phosphatase 47      Total Protein 7.3      AST 16      Bilirubin, Total 0.4      ALT 10     URINALYSIS WITH REFLEX CULTURE AND MICROSCOPIC - Abnormal    Color, Urine Yellow      Appearance, Urine Turbid (*)     Specific Gravity, Urine 1.029      pH, Urine 6.0      Protein, Urine 30 (1+) (*)     Glucose, Urine Normal      Blood, Urine NEGATIVE      Ketones, Urine 150 (4+) (*)     Bilirubin, Urine NEGATIVE      Urobilinogen, Urine Normal      Nitrite, Urine  NEGATIVE      Leukocyte Esterase, Urine 500 Yelitza/µL (*)    MICROSCOPIC ONLY, URINE - Abnormal    WBC, Urine 6-10 (*)     RBC, Urine NONE      Squamous Epithelial Cells, Urine 26-50 (1+)      Bacteria, Urine 1+ (*)     Mucus, Urine 4+     LACTATE - Normal    Lactate 0.9      Narrative:     Venipuncture immediately after or during the administration of Metamizole may lead to falsely low results. Testing should be performed immediately  prior to Metamizole dosing.   URINE CULTURE   URINALYSIS WITH REFLEX CULTURE AND MICROSCOPIC    Narrative:     The following orders were created for panel order Urinalysis with Reflex Culture and Microscopic.  Procedure                               Abnormality         Status                     ---------                               -----------         ------                     Urinalysis with Reflex C...[[id number]]  Abnormal            Final result               Extra Urine Gray Tube[[id number]]                            In process                   Please view results for these tests on the individual orders.   EXTRA URINE GRAY TUBE        No orders to display         Procedure  Procedures     Lenard Rodríguez PA-C  09/16/24 1730

## 2024-09-17 LAB — HOLD SPECIMEN: NORMAL

## 2024-09-18 LAB — BACTERIA UR CULT: NORMAL

## 2024-09-20 LAB — COMMENTS - MP RESULT TYPE: NORMAL

## 2024-09-30 ENCOUNTER — HOSPITAL ENCOUNTER (OUTPATIENT)
Dept: RADIOLOGY | Facility: HOSPITAL | Age: 29
Discharge: HOME | End: 2024-09-30
Payer: COMMERCIAL

## 2024-09-30 DIAGNOSIS — Z34.91 PRENATAL CARE IN FIRST TRIMESTER (HHS-HCC): ICD-10-CM

## 2024-09-30 PROCEDURE — 76801 OB US < 14 WKS SINGLE FETUS: CPT | Performed by: OBSTETRICS & GYNECOLOGY

## 2024-09-30 PROCEDURE — 76801 OB US < 14 WKS SINGLE FETUS: CPT

## 2024-10-04 ENCOUNTER — APPOINTMENT (OUTPATIENT)
Dept: OBSTETRICS AND GYNECOLOGY | Facility: CLINIC | Age: 29
End: 2024-10-04
Payer: COMMERCIAL

## 2024-10-04 VITALS — WEIGHT: 96.2 LBS | SYSTOLIC BLOOD PRESSURE: 109 MMHG | BODY MASS INDEX: 20.11 KG/M2 | DIASTOLIC BLOOD PRESSURE: 70 MMHG

## 2024-10-04 DIAGNOSIS — Z34.91 PRENATAL CARE IN FIRST TRIMESTER (HHS-HCC): ICD-10-CM

## 2024-10-04 PROBLEM — Z3A.14 14 WEEKS GESTATION OF PREGNANCY (HHS-HCC): Status: ACTIVE | Noted: 2024-09-06

## 2024-10-04 PROCEDURE — 87491 CHLMYD TRACH DNA AMP PROBE: CPT

## 2024-10-04 PROCEDURE — 0501F PRENATAL FLOW SHEET: CPT

## 2024-10-04 PROCEDURE — 87591 N.GONORRHOEAE DNA AMP PROB: CPT

## 2024-10-04 PROCEDURE — 87661 TRICHOMONAS VAGINALIS AMPLIF: CPT

## 2024-10-04 NOTE — PROGRESS NOTES
Subjective     Dorothy Drummond is a 28 y.o.  at 14w2d with a working estimated date of delivery of 2025, by Last Menstrual Period who presents for a routine prenatal visit. She denies vaginal bleeding. Patient reports good compliance with PNV.     Patient down one pound since last visit, though reports that nausea is improving.  Patient reports good mood.  She will be traveling to [place] to see her boyfriend's family.    Reviewed all new ob labs and NT scan, wnl      Current Outpatient Medications on File Prior to Visit   Medication Sig Dispense Refill    albuterol (ProAir HFA) 90 mcg/actuation inhaler Inhale 2 puffs every 4 hours if needed for wheezing or shortness of breath. 8.5 g 0    FLUoxetine (PROzac) 20 mg tablet Take 1 tablet (20 mg) by mouth once daily.      fluticasone (Flonase) 50 mcg/actuation nasal spray Administer 1 spray into each nostril once daily. Shake gently. Before first use, prime pump. After use, clean tip and replace cap. 16 g 0     No current facility-administered medications on file prior to visit.        Her pregnancy is complicated by:  Medical Problems       Problem List       Mild intermittent asthma without complication ([facility])    Overview Signed 2024  5:09 PM by LORENZO West     No history of hospitalizations or intubation.         Anxiety and depression    Depression affecting pregnancy ([facility])    Overview Signed 2024  5:04 PM by LORENZO West     On proscac 20 mg daily and sees psych and [facility]         14 weeks gestation of pregnancy ([facility])    Overview Addendum 10/4/2024  3:44 PM by LORENZO West       Desired provider in labor: [x] CNM  [] Physician  [x] Blood Products: [x] Yes, accepts [] No, needs counseling  [x] Initial BMI: 19.86   [x] Prenatal Labs: wnl  [] Cervical Cancer Screening up to date  [x] Rh status: O+  [x] Genetic Screening:  risk reducing, having a girl  [x] NT US: (11-13  wks): wnl  [] Baby ASA (if indicated): n/a  [x] Pregnancy dated by: LMP    [] Anatomy US: (19-20 wks)  [] Federal Sterilization consent signed (if indicated):  [] 1hr GCT at 24-28wks:  [] Rhogam (if indicated):   [] Fetal Surveillance (if indicated):  [] Tdap (27-32 wks, may be given up to 36 wks if initial window missed):   [] RSV (32-36 wks) (Sept. to end ):   [x] Flu Vaccine: 10/4/24    [] Breastfeeding:  [] Postpartum Birth control method:   [] GBS at 36 - 37 wks:  [] 39 weeks discussion of IOL vs. Expectant management:  [] Mode of delivery ( anticipated ):                 Objective   Weight: (!) 43.6 kg (96 lb 3.2 oz)  Expected Total Weight Gain: 11.5 kg (25 lb)-16 kg (35 lb)   TW.544 kg (1 lb 3.2 oz)  Pregravid BMI: 19.86      BP: 109/70          Prenatal Labs:  Lab Results   Component Value Date    HGB 11.9 (L) 2024    HCT 35.2 (L) 2024     2024    ABO O 2024    LABRH POS 2024    NEISSGONOAMP Negative 2024    CHLAMTRACAMP Positive (A) 2024    SYPHT Nonreactive 2024    HEPBSAG Nonreactive 2024    HIV1X2 Nonreactive 2024    URINECULTURE No significant growth 2024       Assessment/Plan   PE with pap today add gc/ct.   If current pap is wnl, next pap in 3 years  Follow up in 4 weeks for a routine prenatal visit.    SAMIRA West-EZRA

## 2024-10-08 LAB
C TRACH RRNA SPEC QL NAA+PROBE: NEGATIVE
N GONORRHOEA DNA SPEC QL PROBE+SIG AMP: NEGATIVE
T VAGINALIS RRNA SPEC QL NAA+PROBE: NEGATIVE

## 2024-10-21 LAB
CYTOLOGY CMNT CVX/VAG CYTO-IMP: NORMAL
HPV HR 12 DNA GENITAL QL NAA+PROBE: NEGATIVE
HPV HR GENOTYPES PNL CVX NAA+PROBE: NEGATIVE
HPV16 DNA SPEC QL NAA+PROBE: NEGATIVE
HPV18 DNA SPEC QL NAA+PROBE: NEGATIVE
LAB AP HPV GENOTYPE QUESTION: YES
LAB AP HPV HR: NORMAL
LAB AP PAP ADDITIONAL TESTS: NORMAL
LABORATORY COMMENT REPORT: NORMAL
PATH REPORT.TOTAL CANCER: NORMAL

## 2024-11-01 ENCOUNTER — APPOINTMENT (OUTPATIENT)
Dept: OBSTETRICS AND GYNECOLOGY | Facility: CLINIC | Age: 29
End: 2024-11-01
Payer: COMMERCIAL

## 2024-11-01 VITALS — DIASTOLIC BLOOD PRESSURE: 66 MMHG | WEIGHT: 97 LBS | SYSTOLIC BLOOD PRESSURE: 103 MMHG | BODY MASS INDEX: 20.27 KG/M2

## 2024-11-01 DIAGNOSIS — Z3A.18 18 WEEKS GESTATION OF PREGNANCY (HHS-HCC): Primary | ICD-10-CM

## 2024-11-01 DIAGNOSIS — Z34.92 SECOND TRIMESTER PREGNANCY (HHS-HCC): ICD-10-CM

## 2024-11-06 ENCOUNTER — APPOINTMENT (OUTPATIENT)
Dept: RADIOLOGY | Facility: HOSPITAL | Age: 29
End: 2024-11-06
Payer: COMMERCIAL

## 2024-11-22 ENCOUNTER — HOSPITAL ENCOUNTER (OUTPATIENT)
Dept: RADIOLOGY | Facility: CLINIC | Age: 29
Discharge: HOME | End: 2024-11-22
Payer: COMMERCIAL

## 2024-11-22 ENCOUNTER — APPOINTMENT (OUTPATIENT)
Dept: OBSTETRICS AND GYNECOLOGY | Facility: CLINIC | Age: 29
End: 2024-11-22
Payer: COMMERCIAL

## 2024-11-22 VITALS — DIASTOLIC BLOOD PRESSURE: 60 MMHG | SYSTOLIC BLOOD PRESSURE: 93 MMHG | BODY MASS INDEX: 21.07 KG/M2 | WEIGHT: 100.8 LBS

## 2024-11-22 DIAGNOSIS — Z3A.21 21 WEEKS GESTATION OF PREGNANCY (HHS-HCC): Primary | ICD-10-CM

## 2024-11-22 DIAGNOSIS — Z34.92 SECOND TRIMESTER PREGNANCY (HHS-HCC): ICD-10-CM

## 2024-11-22 DIAGNOSIS — N89.8 VAGINAL ITCHING: ICD-10-CM

## 2024-11-22 DIAGNOSIS — Z34.91 PRENATAL CARE IN FIRST TRIMESTER (HHS-HCC): ICD-10-CM

## 2024-11-22 PROCEDURE — 99214 OFFICE O/P EST MOD 30 MIN: CPT

## 2024-11-22 PROCEDURE — 87205 SMEAR GRAM STAIN: CPT

## 2024-11-22 PROCEDURE — 76811 OB US DETAILED SNGL FETUS: CPT

## 2024-11-22 RX ORDER — CLOTRIMAZOLE AND BETAMETHASONE DIPROPIONATE 10; .64 MG/G; MG/G
1 CREAM TOPICAL 2 TIMES DAILY
Qty: 15 G | Refills: 0 | Status: SHIPPED | OUTPATIENT
Start: 2024-11-22 | End: 2024-12-20

## 2024-11-22 NOTE — PROGRESS NOTES
Subjective     Dorothy Drummond is a 28 y.o.  at 21w2d with a working estimated date of delivery of 2025, by Last Menstrual Period who presents for a routine prenatal visit. She denies vaginal bleeding, leakage of fluid, or contractions. Patient reports feeling fetal movement and compliance with   Current Outpatient Medications on File Prior to Visit   Medication Sig Dispense Refill    albuterol (ProAir HFA) 90 mcg/actuation inhaler Inhale 2 puffs every 4 hours if needed for wheezing or shortness of breath. 8.5 g 0    FLUoxetine (PROzac) 20 mg tablet Take 1 tablet (20 mg) by mouth once daily.      fluticasone (Flonase) 50 mcg/actuation nasal spray Administer 1 spray into each nostril once daily. Shake gently. Before first use, prime pump. After use, clean tip and replace cap. 16 g 0     No current facility-administered medications on file prior to visit.        Her pregnancy is complicated by:  Medical Problems       Problem List       Mild intermittent asthma without complication ([facility])    Overview Signed 2024  5:09 PM by LORENZO West     No history of hospitalizations or intubation.         Anxiety and depression    Depression affecting pregnancy ([facility])    Overview Signed 2024  5:04 PM by LORENZO West     On proscac 20 mg daily and sees psych and therapist [facility]         21 weeks gestation of pregnancy ([facility])    Overview Addendum 10/4/2024  3:44 PM by LORENZO West       Desired provider in labor: [x] CNM  [] Physician  [x] Blood Products: [x] Yes, accepts [] No, needs counseling  [x] Initial BMI: 19.86   [x] Prenatal Labs: wnl  [] Cervical Cancer Screening up to date  [x] Rh status: O+  [x] Genetic Screening:  risk reducing, having a girl  [x] NT US: (11-13 wks): wnl  [] Baby ASA (if indicated): n/a  [x] Pregnancy dated by: LMP    [] Anatomy US: (19-20 wks)  [] Federal Sterilization consent signed (if indicated):  [] 1hr GCT at  24-28wks:  [] Rhogam (if indicated):   [] Fetal Surveillance (if indicated):  [] Tdap (27-32 wks, may be given up to 36 wks if initial window missed):   [] RSV (32-36 wks) (Sept. to end ):   [x] Flu Vaccine: 10/4/24    [] Breastfeeding:  [] Postpartum Birth control method:   [] GBS at 36 - 37 wks:  [] 39 weeks discussion of IOL vs. Expectant management:  [] Mode of delivery ( anticipated ):                 Objective   Weight: 45.7 kg (100 lb 12.8 oz)  Expected Total Weight Gain: 11.5 kg (25 lb)-16 kg (35 lb)   TW.631 kg (5 lb 12.8 oz)  Pregravid BMI: 19.86      BP: 93/60          Prenatal Labs:  Lab Results   Component Value Date    HGB 11.9 (L) 2024    HCT 35.2 (L) 2024     2024    ABO O 2024    LABRH POS 2024    NEISSGONOAMP Negative 10/04/2024    CHLAMTRACAMP Negative 10/04/2024    SYPHT Nonreactive 2024    HEPBSAG Nonreactive 2024    HIV1X2 Nonreactive 2024    URINECULTURE No significant growth 2024       Assessment/Plan   ***  Reviewed s/sx of PTL, warning signs, and when to call provider  Follow up in *** weeks for a routine prenatal visit.    LORENZO West

## 2024-11-22 NOTE — PROGRESS NOTES
"Jennifer Drummond is a 28 y.o.  at 21w2d with a working estimated date of delivery of 2025, by Last Menstrual Period who presents for a routine prenatal visit. She denies vaginal bleeding, leakage of fluid, or contractions. Patient reports feeling fetal movement and compliance with  PNV.     Anatomy scan reviewed- \"ultrasound showed a sonographic marker for aneuploidy: Decreased O/E Humerus and Femur length ratios. In the setting of rrcfDNA screening and an otherwise  normal targeted anatomic survey, these ratios do not significantly increase the risk for fetal aneuploidy. In addition, the humerus and femur lengths measure appropriate for  gestational age. The ratios are decreased due to the biparietal diameter of the fetal head at the 96th%. Therefore, these ratios today are not of significant clinical concern  such as for non-aneuploidy conditions or fetal growth restriction\"    Results reviewed with patient who is 4# 10 in. Tall.     Patient with concerns for yeast as she has vaginal itching and discharge for the last three  days. Patient treated herself with monistat one three days ago and symptoms have persisted.    Patient up 3 pounds since last visit, 5 pounds overall.      Current Outpatient Medications on File Prior to Visit   Medication Sig Dispense Refill    albuterol (ProAir HFA) 90 mcg/actuation inhaler Inhale 2 puffs every 4 hours if needed for wheezing or shortness of breath. 8.5 g 0    FLUoxetine (PROzac) 20 mg tablet Take 1 tablet (20 mg) by mouth once daily.      fluticasone (Flonase) 50 mcg/actuation nasal spray Administer 1 spray into each nostril once daily. Shake gently. Before first use, prime pump. After use, clean tip and replace cap. 16 g 0     No current facility-administered medications on file prior to visit.        Her pregnancy is complicated by:  Medical Problems       Problem List       Mild intermittent asthma without complication ([facility])    Overview " Signed 2024  5:09 PM by LORENZO West     No history of hospitalizations or intubation.         Anxiety and depression    Depression affecting pregnancy ([facility])    Overview Signed 2024  5:04 PM by LORENZO West     On proscac 20 mg daily and sees psych and therapist [facility]         21 weeks gestation of pregnancy ([facility])    Overview Addendum 2024  4:47 PM by LORENZO West       Desired provider in labor: [x] CNM  [] Physician  [x] Blood Products: [x] Yes, accepts [] No, needs counseling  [x] Initial BMI: 19.86   [x] Prenatal Labs: wnl  [x] Cervical Cancer Screening up to date: 10/2024  [x] Rh status: O+  [x] Genetic Screening:  risk reducing, having a girl  [x] NT US: (11-13 wks): wnl  [] Baby ASA (if indicated): n/a  [x] Pregnancy dated by: LMP    [x] Anatomy US: (19-20 wks)  [] Federal Sterilization consent signed (if indicated):  [] 1hr GCT at 24-28wks:  [] Rhogam (if indicated):   [] Fetal Surveillance (if indicated):  [] Tdap (27-32 wks, may be given up to 36 wks if initial window missed):   [] RSV (32-36 wks) (Sept. to end of ):   [x] Flu Vaccine: 10/4/24    [] Breastfeeding:  [] Postpartum Birth control method:   [] GBS at 36 - 37 wks:  [] 39 weeks discussion of IOL vs. Expectant management:  [] Mode of delivery ( anticipated ):                 Objective   Weight: 45.7 kg (100 lb 12.8 oz)  Expected Total Weight Gain: 11.5 kg (25 lb)-16 kg (35 lb)   TW.631 kg (5 lb 12.8 oz)  Pregravid BMI: 19.86    Physical Exam  Constitutional:       Appearance: Normal appearance.   Eyes:      Conjunctiva/sclera: Conjunctivae normal.   Pulmonary:      Effort: Pulmonary effort is normal.   Genitourinary:     General: Normal vulva.      Vagina: Vaginal discharge present.      Comments: Small amount of non- adherent white discharge noted in vault  Neurological:      Mental Status: She is alert.   Psychiatric:         Mood and Affect: Mood normal.             BP: 93/60  Fetal Heart Rate: 145 Fundal Height (cm): 22 cm     Prenatal Labs:  Lab Results   Component Value Date    HGB 11.9 (L) 09/16/2024    HCT 35.2 (L) 09/16/2024     09/16/2024    ABO O 09/06/2024    LABRH POS 09/06/2024    NEISSGONOAMP Negative 10/04/2024    CHLAMTRACAMP Negative 10/04/2024    SYPHT Nonreactive 09/06/2024    HEPBSAG Nonreactive 09/06/2024    HIV1X2 Nonreactive 09/06/2024    URINECULTURE No significant growth 09/16/2024       Assessment/Plan   Vaginitis cx collected today. Will treat based on results. Rx for lotrisone sent to patient's pharmacy to treat symptoms of itching.   Second trimester anticipatory guidance provided.  Follow up in 4 weeks for a routine prenatal visit.    SAMIRA West-EZRA

## 2024-11-23 LAB
BACTERIAL VAGINOSIS VAG-IMP: NORMAL
CLUE CELLS VAG LPF-#/AREA: NORMAL /[LPF]
NUGENT SCORE: 4
YEAST VAG WET PREP-#/AREA: NORMAL

## 2024-12-13 ENCOUNTER — APPOINTMENT (OUTPATIENT)
Dept: OBSTETRICS AND GYNECOLOGY | Facility: CLINIC | Age: 29
End: 2024-12-13
Payer: COMMERCIAL

## 2024-12-13 VITALS — BODY MASS INDEX: 21.32 KG/M2 | SYSTOLIC BLOOD PRESSURE: 94 MMHG | WEIGHT: 102 LBS | DIASTOLIC BLOOD PRESSURE: 64 MMHG

## 2024-12-13 DIAGNOSIS — K59.00 CONSTIPATION, UNSPECIFIED CONSTIPATION TYPE: ICD-10-CM

## 2024-12-13 DIAGNOSIS — Z3A.24 24 WEEKS GESTATION OF PREGNANCY (HHS-HCC): Primary | ICD-10-CM

## 2024-12-13 RX ORDER — DOCUSATE SODIUM 100 MG/1
100 CAPSULE, LIQUID FILLED ORAL 2 TIMES DAILY
Qty: 180 CAPSULE | Refills: 1 | Status: SHIPPED | OUTPATIENT
Start: 2024-12-13

## 2024-12-13 NOTE — PROGRESS NOTES
Subjective     Dorothy Drummond is a 29 y.o.  at 24w2d with a working estimated date of delivery of 2025, by Last Menstrual Period who presents for a routine prenatal visit. She denies vaginal bleeding, leakage of fluid, or contractions. Patient reports feeling fetal movement and compliance with PNV.     Patient aware of need to complete CBC and one hour with next visit. BLOOD TYPE O+.  Patient educated that she does not have to fast for the one hour glucose, however she should avoid a carb heavy meal prior to the test.   Current Outpatient Medications on File Prior to Visit   Medication Sig Dispense Refill    albuterol (ProAir HFA) 90 mcg/actuation inhaler Inhale 2 puffs every 4 hours if needed for wheezing or shortness of breath. 8.5 g 0    clotrimazole-betamethasone (Lotrisone) cream Apply 1 Application topically 2 times a day for 28 days. 15 g 0    FLUoxetine (PROzac) 20 mg tablet Take 1 tablet (20 mg) by mouth once daily.      fluticasone (Flonase) 50 mcg/actuation nasal spray Administer 1 spray into each nostril once daily. Shake gently. Before first use, prime pump. After use, clean tip and replace cap. 16 g 0     No current facility-administered medications on file prior to visit.        Her pregnancy is complicated by:  Medical Problems       Problem List       Mild intermittent asthma without complication ([facility])    Overview Signed 2024  5:09 PM by LORENZO West     No history of hospitalizations or intubation.         Anxiety and depression    Depression affecting pregnancy ([facility])    Overview Signed 2024  5:04 PM by LORENZO West     On proscac 20 mg daily and sees psych and therapist [facility]         24 weeks gestation of pregnancy ([facility])    Overview Addendum 2024  4:47 PM by LORENZO West       Desired provider in labor: [x] CNM  [] Physician  [x] Blood Products: [x] Yes, accepts [] No, needs counseling  [x] Initial BMI:  19.86   [x] Prenatal Labs: wnl  [x] Cervical Cancer Screening up to date: 10/2024  [x] Rh status: O+  [x] Genetic Screening:  risk reducing, having a girl  [x] NT US: (11-13 wks): wnl  [] Baby ASA (if indicated): n/a  [x] Pregnancy dated by: LMP    [x] Anatomy US: (19-20 wks)  [] Federal Sterilization consent signed (if indicated):  [] 1hr GCT at 24-28wks:  [] Rhogam (if indicated):   [] Fetal Surveillance (if indicated):  [] Tdap (27-32 wks, may be given up to 36 wks if initial window missed):   [] RSV (32-36 wks) (Sept. to end of Jan):   [x] Flu Vaccine: 10/4/24    [] Breastfeeding:  [] Postpartum Birth control method:   [] GBS at 36 - 37 wks:  [] 39 weeks discussion of IOL vs. Expectant management:  [] Mode of delivery ( anticipated ):                 Objective   Weight: 46.3 kg (102 lb)  Expected Total Weight Gain: 11.5 kg (25 lb)-16 kg (35 lb)   TWG: 3.175 kg (7 lb)  Pregravid BMI: 19.86      BP: 94/64  Fetal Heart Rate: 145 Fundal Height (cm): 24 cm     Prenatal Labs:  Lab Results   Component Value Date    HGB 11.9 (L) 09/16/2024    HCT 35.2 (L) 09/16/2024     09/16/2024    ABO O 09/06/2024    LABRH POS 09/06/2024    NEISSGONOAMP Negative 10/04/2024    CHLAMTRACAMP Negative 10/04/2024    SYPHT Nonreactive 09/06/2024    HEPBSAG Nonreactive 09/06/2024    HIV1X2 Nonreactive 09/06/2024    URINECULTURE No significant growth 09/16/2024       Assessment/Plan   One hour and CBC prior to next visit.   TDAP next visit  Follow up in 4 weeks for a routine prenatal visit.    Maida Kaye, SAMIRA-EZRA

## 2025-01-08 ENCOUNTER — APPOINTMENT (OUTPATIENT)
Dept: CARDIOLOGY | Facility: HOSPITAL | Age: 30
End: 2025-01-08
Payer: COMMERCIAL

## 2025-01-08 ENCOUNTER — HOSPITAL ENCOUNTER (EMERGENCY)
Facility: HOSPITAL | Age: 30
Discharge: HOME | End: 2025-01-08
Payer: COMMERCIAL

## 2025-01-08 VITALS
WEIGHT: 103 LBS | BODY MASS INDEX: 21.62 KG/M2 | RESPIRATION RATE: 18 BRPM | SYSTOLIC BLOOD PRESSURE: 108 MMHG | TEMPERATURE: 97 F | HEART RATE: 80 BPM | DIASTOLIC BLOOD PRESSURE: 59 MMHG | OXYGEN SATURATION: 100 % | HEIGHT: 58 IN

## 2025-01-08 DIAGNOSIS — R04.0 EPISTAXIS: Primary | ICD-10-CM

## 2025-01-08 LAB
ALBUMIN SERPL BCP-MCNC: 3.5 G/DL (ref 3.4–5)
ALP SERPL-CCNC: 75 U/L (ref 33–110)
ALT SERPL W P-5'-P-CCNC: 7 U/L (ref 7–45)
ANION GAP SERPL CALC-SCNC: 10 MMOL/L (ref 10–20)
AST SERPL W P-5'-P-CCNC: 16 U/L (ref 9–39)
ATRIAL RATE: 72 BPM
BASOPHILS # BLD AUTO: 0.03 X10*3/UL (ref 0–0.1)
BASOPHILS NFR BLD AUTO: 0.3 %
BILIRUB SERPL-MCNC: 0.3 MG/DL (ref 0–1.2)
BUN SERPL-MCNC: 6 MG/DL (ref 6–23)
CALCIUM SERPL-MCNC: 8.4 MG/DL (ref 8.6–10.3)
CHLORIDE SERPL-SCNC: 105 MMOL/L (ref 98–107)
CO2 SERPL-SCNC: 22 MMOL/L (ref 21–32)
CREAT SERPL-MCNC: 0.36 MG/DL (ref 0.5–1.05)
EGFRCR SERPLBLD CKD-EPI 2021: >90 ML/MIN/1.73M*2
EOSINOPHIL # BLD AUTO: 0.11 X10*3/UL (ref 0–0.7)
EOSINOPHIL NFR BLD AUTO: 1.2 %
ERYTHROCYTE [DISTWIDTH] IN BLOOD BY AUTOMATED COUNT: 13.2 % (ref 11.5–14.5)
GLUCOSE SERPL-MCNC: 79 MG/DL (ref 74–99)
HCT VFR BLD AUTO: 27.3 % (ref 36–46)
HGB BLD-MCNC: 9.1 G/DL (ref 12–16)
IMM GRANULOCYTES # BLD AUTO: 0.08 X10*3/UL (ref 0–0.7)
IMM GRANULOCYTES NFR BLD AUTO: 0.9 % (ref 0–0.9)
LYMPHOCYTES # BLD AUTO: 2.29 X10*3/UL (ref 1.2–4.8)
LYMPHOCYTES NFR BLD AUTO: 25.1 %
MAGNESIUM SERPL-MCNC: 1.87 MG/DL (ref 1.6–2.4)
MCH RBC QN AUTO: 28 PG (ref 26–34)
MCHC RBC AUTO-ENTMCNC: 33.3 G/DL (ref 32–36)
MCV RBC AUTO: 84 FL (ref 80–100)
MONOCYTES # BLD AUTO: 0.59 X10*3/UL (ref 0.1–1)
MONOCYTES NFR BLD AUTO: 6.5 %
NEUTROPHILS # BLD AUTO: 6.03 X10*3/UL (ref 1.2–7.7)
NEUTROPHILS NFR BLD AUTO: 66 %
NRBC BLD-RTO: 0 /100 WBCS (ref 0–0)
P AXIS: 46 DEGREES
P OFFSET: 196 MS
P ONSET: 165 MS
PLATELET # BLD AUTO: 236 X10*3/UL (ref 150–450)
POTASSIUM SERPL-SCNC: 3.4 MMOL/L (ref 3.5–5.3)
PR INTERVAL: 112 MS
PROT SERPL-MCNC: 6.9 G/DL (ref 6.4–8.2)
Q ONSET: 221 MS
QRS COUNT: 12 BEATS
QRS DURATION: 70 MS
QT INTERVAL: 372 MS
QTC CALCULATION(BAZETT): 407 MS
QTC FREDERICIA: 395 MS
R AXIS: 66 DEGREES
RBC # BLD AUTO: 3.25 X10*6/UL (ref 4–5.2)
SODIUM SERPL-SCNC: 134 MMOL/L (ref 136–145)
T AXIS: 45 DEGREES
T OFFSET: 407 MS
VENTRICULAR RATE: 72 BPM
WBC # BLD AUTO: 9.1 X10*3/UL (ref 4.4–11.3)

## 2025-01-08 PROCEDURE — 96360 HYDRATION IV INFUSION INIT: CPT

## 2025-01-08 PROCEDURE — 96361 HYDRATE IV INFUSION ADD-ON: CPT

## 2025-01-08 PROCEDURE — 2500000004 HC RX 250 GENERAL PHARMACY W/ HCPCS (ALT 636 FOR OP/ED): Performed by: PHYSICIAN ASSISTANT

## 2025-01-08 PROCEDURE — 93005 ELECTROCARDIOGRAM TRACING: CPT

## 2025-01-08 PROCEDURE — 83735 ASSAY OF MAGNESIUM: CPT | Performed by: PHYSICIAN ASSISTANT

## 2025-01-08 PROCEDURE — 36415 COLL VENOUS BLD VENIPUNCTURE: CPT | Performed by: PHYSICIAN ASSISTANT

## 2025-01-08 PROCEDURE — 99284 EMERGENCY DEPT VISIT MOD MDM: CPT

## 2025-01-08 PROCEDURE — 85025 COMPLETE CBC W/AUTO DIFF WBC: CPT | Performed by: PHYSICIAN ASSISTANT

## 2025-01-08 PROCEDURE — 80053 COMPREHEN METABOLIC PANEL: CPT | Performed by: PHYSICIAN ASSISTANT

## 2025-01-08 RX ADMIN — SODIUM CHLORIDE 1000 ML: 9 INJECTION, SOLUTION INTRAVENOUS at 15:03

## 2025-01-08 ASSESSMENT — LIFESTYLE VARIABLES
EVER HAD A DRINK FIRST THING IN THE MORNING TO STEADY YOUR NERVES TO GET RID OF A HANGOVER: NO
TOTAL SCORE: 0
EVER FELT BAD OR GUILTY ABOUT YOUR DRINKING: NO
HAVE YOU EVER FELT YOU SHOULD CUT DOWN ON YOUR DRINKING: NO
HAVE PEOPLE ANNOYED YOU BY CRITICIZING YOUR DRINKING: NO

## 2025-01-08 ASSESSMENT — PAIN DESCRIPTION - LOCATION: LOCATION: ABDOMEN

## 2025-01-08 ASSESSMENT — PAIN SCALES - GENERAL: PAINLEVEL_OUTOF10: 4

## 2025-01-08 ASSESSMENT — PAIN - FUNCTIONAL ASSESSMENT: PAIN_FUNCTIONAL_ASSESSMENT: 0-10

## 2025-01-08 ASSESSMENT — PAIN DESCRIPTION - PAIN TYPE: TYPE: ACUTE PAIN

## 2025-01-08 NOTE — ED PROVIDER NOTES
HPI   Chief Complaint   Patient presents with    Epistaxis (Nose Bleed)     Also thinks she dehydrated     Pregnancy Problem       This is a 29-year-old  female currently 28 weeks pregnant presenting for evaluation of atraumatic primarily left-sided epistaxis x 3 since this morning each episode lasting about 5 minutes and the last episode she had associated lightheadedness.  Has a history of anemia with prior pregnancies.  Denies any vaginal bleeding or abdominal pain.  Denies chest pain, palpitations, shortness of breath.  Not anticoagulated.      History provided by:  Patient   used: No            Patient History   Past Medical History:   Diagnosis Date    Anemia     Complete or unspecified spontaneous  without complication 2020    Complete spontaneous     Depression     Encounter for examination of ears and hearing without abnormal findings 2016    Encounter for hearing evaluation    Female infertility     Otitis media, unspecified, right ear 10/20/2020    Otitis media, right    Pain in unspecified ankle and joints of unspecified foot 2021    Ankle pain    Personal history of other (healed) physical injury and trauma 2021    History of sprain of ankle    Personal history of other diseases of the digestive system 2017    History of oral lesions    Personal history of other diseases of the digestive system 2020    History of dental abscess    Personal history of other diseases of the respiratory system 2020    History of pharyngitis    Possible exposure to STD 02/15/2024     Past Surgical History:   Procedure Laterality Date    CT ABDOMEN PELVIS ANGIOGRAM W AND/OR WO IV CONTRAST  2019    CT ABDOMEN PELVIS ANGIOGRAM W AND/OR WO IV CONTRAST 2019 [facility] [place] [facility]    TONSILLECTOMY  2014    Tonsillectomy     No family history on file.  Social History     Tobacco Use    Smoking status: Never    Smokeless tobacco: Never    Vaping Use    Vaping status: Never Used   Substance Use Topics    Alcohol use: Not Currently    Drug use: Never       Physical Exam   ED Triage Vitals [01/08/25 1339]   Temperature Heart Rate Respirations BP   36.1 °C (97 °F) 92 19 117/56      Pulse Ox Temp src Heart Rate Source Patient Position   100 % -- -- --      BP Location FiO2 (%)     -- --       Physical Exam  Constitutional:       Appearance: Normal appearance.   HENT:      Nose: Nose normal.      Comments: No blood in the posterior oropharynx.  No active bleeding.  Minimal nasal hyperemia.     Mouth/Throat:      Mouth: Mucous membranes are moist.      Pharynx: Oropharynx is clear.      Comments: Posterior oropharynx patent.  No tongue swelling.  No swelling of the floor of the mouth.  Uvula midline.  Cardiovascular:      Rate and Rhythm: Normal rate and regular rhythm.      Pulses: Normal pulses.      Heart sounds: Normal heart sounds.   Pulmonary:      Effort: Pulmonary effort is normal.      Breath sounds: Normal breath sounds.   Abdominal:      Palpations: Abdomen is soft.      Tenderness: There is no abdominal tenderness. There is no guarding or rebound.   Musculoskeletal:      Cervical back: Normal range of motion and neck supple.   Skin:     General: Skin is warm and dry.   Neurological:      Mental Status: She is alert and oriented to person, place, and time.           ED Course & MDM   Diagnoses as of 01/08/25 1514   Epistaxis                 No data recorded     Lencho Coma Scale Score: 15 (01/08/25 1340 : Dali Garza, YADI)                           Medical Decision Making  Patient is stable hemodynamically.  Visibly nontoxic-appearing no apparent distress.  No active bleeding.  Not anticoagulated.  No history or evidence of trauma.  Patient is anemic today similar to how anemic she has been in the past and states she has a history of anemia with her pregnancies.  She is maintaining her pressure and was given a liter of IV normal saline.  Has  had no rebleeding here.  Remainder of laboratory evaluation is reassuring.  EKG reassuring.  At this time patient stable for discharge.  Given return precautions.      Disclaimer: This note was dictated using speech recognition software. An attempt at proofreading was made to minimize errors. Minor errors in transcription may be present. Please call if questions.    Amount and/or Complexity of Data Reviewed  Labs: ordered.  ECG/medicine tests: ordered and independent interpretation performed.     Details: EKG interpreted by me: Normal sinus rhythm with sinus arrhythmia.  Rate 72.  Normal axis.  No acute T wave changes.  No STEMI.  QTc 407.        Procedure  Procedures     Sumit Espinal PA-C  01/08/25 6803

## 2025-01-09 ENCOUNTER — LAB (OUTPATIENT)
Dept: LAB | Facility: LAB | Age: 30
End: 2025-01-09
Payer: COMMERCIAL

## 2025-01-09 DIAGNOSIS — Z3A.24 24 WEEKS GESTATION OF PREGNANCY (HHS-HCC): ICD-10-CM

## 2025-01-09 LAB
ERYTHROCYTE [DISTWIDTH] IN BLOOD BY AUTOMATED COUNT: 13.3 % (ref 11.5–14.5)
FERRITIN SERPL-MCNC: 13 NG/ML
FOLATE SERPL-MCNC: 6.8 NG/ML
GLUCOSE 1H P 50 G GLC PO SERPL-MCNC: 114 MG/DL
HCT VFR BLD AUTO: 30.1 % (ref 36–46)
HGB BLD-MCNC: 9.3 G/DL (ref 12–16)
IRON SATN MFR SERPL: NORMAL %
IRON SERPL-MCNC: 24 UG/DL
MCH RBC QN AUTO: 27.3 PG (ref 26–34)
MCHC RBC AUTO-ENTMCNC: 30.9 G/DL (ref 32–36)
MCV RBC AUTO: 88 FL (ref 80–100)
NRBC BLD-RTO: 0 /100 WBCS (ref 0–0)
PLATELET # BLD AUTO: 244 X10*3/UL (ref 150–450)
RBC # BLD AUTO: 3.41 X10*6/UL (ref 4–5.2)
REFLEX ADDED, ANEMIA PANEL: NORMAL
TIBC SERPL-MCNC: NORMAL UG/DL
UIBC SERPL-MCNC: >450 UG/DL
VIT B12 SERPL-MCNC: 300 PG/ML
WBC # BLD AUTO: 9 X10*3/UL (ref 4.4–11.3)

## 2025-01-09 PROCEDURE — 82607 VITAMIN B-12: CPT

## 2025-01-09 PROCEDURE — 82947 ASSAY GLUCOSE BLOOD QUANT: CPT

## 2025-01-09 PROCEDURE — 82746 ASSAY OF FOLIC ACID SERUM: CPT

## 2025-01-09 PROCEDURE — 85027 COMPLETE CBC AUTOMATED: CPT

## 2025-01-09 PROCEDURE — 82728 ASSAY OF FERRITIN: CPT

## 2025-01-09 PROCEDURE — 83550 IRON BINDING TEST: CPT

## 2025-01-10 ENCOUNTER — APPOINTMENT (OUTPATIENT)
Dept: OBSTETRICS AND GYNECOLOGY | Facility: CLINIC | Age: 30
End: 2025-01-10
Payer: COMMERCIAL

## 2025-01-10 VITALS — DIASTOLIC BLOOD PRESSURE: 55 MMHG | BODY MASS INDEX: 21.65 KG/M2 | SYSTOLIC BLOOD PRESSURE: 92 MMHG | WEIGHT: 103.6 LBS

## 2025-01-10 DIAGNOSIS — O99.013 ANEMIA AFFECTING PREGNANCY IN THIRD TRIMESTER (HHS-HCC): Primary | ICD-10-CM

## 2025-01-10 DIAGNOSIS — Z3A.28 28 WEEKS GESTATION OF PREGNANCY (HHS-HCC): ICD-10-CM

## 2025-01-10 DIAGNOSIS — K59.00 CONSTIPATION, UNSPECIFIED CONSTIPATION TYPE: ICD-10-CM

## 2025-01-10 RX ORDER — FERROUS SULFATE 325(65) MG
TABLET, DELAYED RELEASE (ENTERIC COATED) ORAL
Qty: 90 TABLET | Refills: 2 | Status: SHIPPED | OUTPATIENT
Start: 2025-01-10

## 2025-01-10 RX ORDER — DOCUSATE SODIUM 100 MG/1
100 CAPSULE, LIQUID FILLED ORAL 2 TIMES DAILY
Qty: 180 CAPSULE | Refills: 1 | Status: SHIPPED | OUTPATIENT
Start: 2025-01-10

## 2025-01-10 NOTE — PROGRESS NOTES
Subjective     Dorothy Drummond is a 29 y.o.  at 28w2d with a working estimated date of delivery of 2025, by Last Menstrual Period who presents for a routine prenatal visit. She denies vaginal bleeding, leakage of fluid, decreased fetal movements, or contractions.    Patient reports overall feeling well. One hour and CBC  reviewed. One hour wnl, PORTIA noted. Patient picked up iron prescription yesterday.    Patient counseled on tdap and agreeable to vaccine today.     Her pregnancy is complicated by:  Medical Problems       Problem List       Mild intermittent asthma without complication ([facility])    Overview Signed 2024  5:09 PM by LORENZO West     No history of hospitalizations or intubation.         Anxiety and depression    Depression affecting pregnancy ([facility])    Overview Signed 2024  5:04 PM by LORENZO West     On proscac 20 mg daily and sees psych and therapist through [facility]         28 weeks gestation of pregnancy ([facility])    Overview Addendum 2024  4:47 PM by LORENZO West       Desired provider in labor: [x] CNM  [] Physician  [x] Blood Products: [x] Yes, accepts [] No, needs counseling  [x] Initial BMI: 19.86   [x] Prenatal Labs: wnl  [x] Cervical Cancer Screening up to date: 10/2024  [x] Rh status: O+  [x] Genetic Screening:  risk reducing, having a girl  [x] NT US: (11-13 wks): wnl  [] Baby ASA (if indicated): n/a  [x] Pregnancy dated by: LMP    [x] Anatomy US: (19-20 wks)  [] Federal Sterilization consent signed (if indicated):  [] 1hr GCT at 24-28wks:  [] Rhogam (if indicated):   [] Fetal Surveillance (if indicated):  [] Tdap (27-32 wks, may be given up to 36 wks if initial window missed):   [] RSV (32-36 wks) (Sept. to end of ):   [x] Flu Vaccine: 10/4/24    [] Breastfeeding:  [] Postpartum Birth control method:   [] GBS at 36 - 37 wks:  [] 39 weeks discussion of IOL vs. Expectant management:  [] Mode of delivery (  anticipated ):          Anemia affecting pregnancy in third trimester ([facility])    Overview Signed 1/10/2025  8:57 AM by LORENZO West     Hgb 9.3, ferritin 13. Iron rx'd. Repeat CBC and retic after two weeks of compliance               Objective   Weight: 47 kg (103 lb 9.6 oz)  TWG: 3.901 kg (8 lb 9.6 oz)  Pregravid BMI: 19.86    BP: 92/55          Prenatal Labs:  Lab Results   Component Value Date    HGB 9.3 (L) 01/09/2025    HCT 30.1 (L) 01/09/2025     01/09/2025    ABO O 09/06/2024    LABRH POS 09/06/2024    NEISSGONOAMP Negative 10/04/2024    CHLAMTRACAMP Negative 10/04/2024    SYPHT Nonreactive 09/06/2024    HEPBSAG Nonreactive 09/06/2024    HIV1X2 Nonreactive 09/06/2024    URINECULTURE No significant growth 09/16/2024       Assessment/Plan   CBC and retic after two weeks of compliance  Follow up in 2 weeks for a routine prenatal visit.    LORENZO West

## 2025-01-18 LAB
ATRIAL RATE: 72 BPM
P AXIS: 46 DEGREES
P OFFSET: 196 MS
P ONSET: 165 MS
PR INTERVAL: 112 MS
Q ONSET: 221 MS
QRS COUNT: 12 BEATS
QRS DURATION: 70 MS
QT INTERVAL: 372 MS
QTC CALCULATION(BAZETT): 407 MS
QTC FREDERICIA: 395 MS
R AXIS: 66 DEGREES
T AXIS: 45 DEGREES
T OFFSET: 407 MS
VENTRICULAR RATE: 72 BPM

## 2025-01-24 ENCOUNTER — APPOINTMENT (OUTPATIENT)
Dept: OBSTETRICS AND GYNECOLOGY | Facility: CLINIC | Age: 30
End: 2025-01-24
Payer: COMMERCIAL

## 2025-01-24 VITALS — SYSTOLIC BLOOD PRESSURE: 109 MMHG | WEIGHT: 102.8 LBS | BODY MASS INDEX: 21.49 KG/M2 | DIASTOLIC BLOOD PRESSURE: 70 MMHG

## 2025-01-24 DIAGNOSIS — Z3A.30 30 WEEKS GESTATION OF PREGNANCY (HHS-HCC): Primary | ICD-10-CM

## 2025-01-24 DIAGNOSIS — Z34.93 THIRD TRIMESTER PREGNANCY (HHS-HCC): ICD-10-CM

## 2025-01-24 DIAGNOSIS — O99.013 ANEMIA AFFECTING PREGNANCY IN THIRD TRIMESTER (HHS-HCC): ICD-10-CM

## 2025-01-24 NOTE — PROGRESS NOTES
Subjective     Dorothy Drummond is a 29 y.o.  at 30w2d with a working estimated date of delivery of 2025, by Last Menstrual Period who presents for a routine prenatal visit. She denies vaginal bleeding, leakage of fluid, decreased fetal movements, or contractions.    Patient reports good compliance with iron.     8# TWG. Encouraged increased consumption of healthy fats.     Her pregnancy is complicated by:  Medical Problems       Problem List       Mild intermittent asthma without complication ([facility])    Overview Signed 2024  5:09 PM by LORENZO West     No history of hospitalizations or intubation.         Anxiety and depression    Depression affecting pregnancy ([facility])    Overview Signed 2024  5:04 PM by LORENZO West     On proscac 20 mg daily and sees psych and therapist through [facility]         30 weeks gestation of pregnancy ([facility])    Overview Addendum 1/10/2025  4:47 PM by LORENZO West       Desired provider in labor: [x] CNM  [] Physician  [x] Blood Products: [x] Yes, accepts [] No, needs counseling  [x] Initial BMI: 19.86   [x] Prenatal Labs: wnl  [x] Cervical Cancer Screening up to date: 10/2024  [x] Rh status: O+  [x] Genetic Screening:  risk reducing, having a girl  [x] NT US: (11-13 wks): wnl  [] Baby ASA (if indicated): n/a  [x] Pregnancy dated by: LMP    [x] Anatomy US: (19-20 wks)  [] Federal Sterilization consent signed (if indicated):  [x] 1hr GCT at 24-28wks: wnl  [] Rhogam (if indicated):   [] Fetal Surveillance (if indicated):  [x] Tdap (27-32 wks, may be given up to 36 wks if initial window missed): 1/10/25  [] RSV (32-36 wks) (Sept. to end of ):   [x] Flu Vaccine: 10/4/24    [x] Breastfeeding:  [x] Postpartum Birth control method: paragard  [] GBS at 36 - 37 wks:  [] 39 weeks discussion of IOL vs. Expectant management:  [] Mode of delivery ( anticipated ):          Anemia affecting pregnancy in third trimester ([facility])     Overview Signed 1/10/2025  8:57 AM by LORENZO West     Hgb 9.3, ferritin 13. Iron rx'd. Repeat CBC and retic after two weeks of compliance               Objective   Weight: 46.6 kg (102 lb 12.8 oz)  TWG: 3.538 kg (7 lb 12.8 oz)  Pregravid BMI: 19.86    BP: 109/70          Prenatal Labs:  Lab Results   Component Value Date    HGB 9.3 (L) 01/09/2025    HCT 30.1 (L) 01/09/2025     01/09/2025    ABO O 09/06/2024    LABRH POS 09/06/2024    NEISSGONOAMP Negative 10/04/2024    CHLAMTRACAMP Negative 10/04/2024    SYPHT Nonreactive 09/06/2024    HEPBSAG Nonreactive 09/06/2024    HIV1X2 Nonreactive 09/06/2024    URINECULTURE No significant growth 09/16/2024       Assessment/Plan   CBC and retic orders placed.   Collect birth preferences  Follow up in 2 weeks for a routine prenatal visit.    LORENZO West

## 2025-02-07 ENCOUNTER — APPOINTMENT (OUTPATIENT)
Dept: OBSTETRICS AND GYNECOLOGY | Facility: CLINIC | Age: 30
End: 2025-02-07
Payer: COMMERCIAL

## 2025-02-07 VITALS — BODY MASS INDEX: 21.57 KG/M2 | WEIGHT: 103.2 LBS | SYSTOLIC BLOOD PRESSURE: 99 MMHG | DIASTOLIC BLOOD PRESSURE: 63 MMHG

## 2025-02-07 DIAGNOSIS — Z34.83 MULTIGRAVIDA IN THIRD TRIMESTER (HHS-HCC): ICD-10-CM

## 2025-02-07 DIAGNOSIS — Z3A.32 32 WEEKS GESTATION OF PREGNANCY (HHS-HCC): Primary | ICD-10-CM

## 2025-02-07 NOTE — PROGRESS NOTES
Subjective     Dorothy Drummond is a 29 y.o.  at 32w2d with a working estimated date of delivery of 2025, by Last Menstrual Period who presents for a routine prenatal visit. She denies vaginal bleeding, leakage of fluid, decreased fetal movements, or contractions.    Patient reports overall feeling well.  Patient has not yet completed CBC or reticulocyte count.      Birth preferences reviewed-see overview and plan.    Her pregnancy is complicated by:  Medical Problems       Problem List       Mild intermittent asthma without complication ([facility])    Overview Signed 2024  5:09 PM by LORENZO West     No history of hospitalizations or intubation.         Anxiety and depression    Depression affecting pregnancy ([facility])    Overview Signed 2024  5:04 PM by LORENZO West     On proscac 20 mg daily and sees psych and therapist through [facility]         32 weeks gestation of pregnancy ([facility])    Overview Addendum 1/10/2025  4:47 PM by LORENZO West       Desired provider in labor: [x] CNM  [] Physician  [x] Blood Products: [x] Yes, accepts [] No, needs counseling  [x] Initial BMI: 19.86   [x] Prenatal Labs: wnl  [x] Cervical Cancer Screening up to date: 10/2024  [x] Rh status: O+  [x] Genetic Screening:  risk reducing, having a girl  [x] NT US: (11-13 wks): wnl  [] Baby ASA (if indicated): n/a  [x] Pregnancy dated by: LMP    [x] Anatomy US: (19-20 wks)  [] Federal Sterilization consent signed (if indicated):  [x] 1hr GCT at 24-28wks: wnl  [] Rhogam (if indicated):   [] Fetal Surveillance (if indicated):  [x] Tdap (27-32 wks, may be given up to 36 wks if initial window missed): 1/10/25  [] RSV (32-36 wks) (Sept. to end of ):   [x] Flu Vaccine: 10/4/24    [x] Breastfeeding:  [x] Postpartum Birth control method: paragard  [] GBS at 36 - 37 wks:  [] 39 weeks discussion of IOL vs. Expectant management:  [] Mode of delivery ( anticipated ):          Anemia  affecting pregnancy in third trimester ([facility])    Overview Signed 1/10/2025  8:57 AM by LORENZO West     Hgb 9.3, ferritin 13. Iron rx'd. Repeat CBC and retic after two weeks of compliance               Objective   Weight: 46.8 kg (103 lb 3.2 oz)  TWG: 3.719 kg (8 lb 3.2 oz)  Pregravid BMI: 19.86    BP: 99/63          Prenatal Labs:  Lab Results   Component Value Date    HGB 9.3 (L) 01/09/2025    HCT 30.1 (L) 01/09/2025     01/09/2025    ABO O 09/06/2024    LABRH POS 09/06/2024    NEISSGONOAMP Negative 10/04/2024    CHLAMTRACAMP Negative 10/04/2024    SYPHT Nonreactive 09/06/2024    HEPBSAG Nonreactive 09/06/2024    HIV1X2 Nonreactive 09/06/2024    URINECULTURE No significant growth 09/16/2024       Assessment/Plan   Patient to complete CBC and retic asap  Schedule IOL at next visit  Reviewed s/sx of PTL, warning signs, fetal movement counts, and when to call provider  Follow up in 2 weeks for a routine prenatal visit.    LORENZO West

## 2025-02-09 ENCOUNTER — HOSPITAL ENCOUNTER (EMERGENCY)
Facility: HOSPITAL | Age: 30
Discharge: HOME | End: 2025-02-09
Attending: EMERGENCY MEDICINE
Payer: COMMERCIAL

## 2025-02-09 VITALS
TEMPERATURE: 97 F | WEIGHT: 103 LBS | DIASTOLIC BLOOD PRESSURE: 52 MMHG | BODY MASS INDEX: 21.62 KG/M2 | HEART RATE: 77 BPM | HEIGHT: 58 IN | RESPIRATION RATE: 18 BRPM | OXYGEN SATURATION: 99 % | SYSTOLIC BLOOD PRESSURE: 98 MMHG

## 2025-02-09 DIAGNOSIS — N39.0 ACUTE UTI: ICD-10-CM

## 2025-02-09 DIAGNOSIS — H66.90 ACUTE OTITIS MEDIA, UNSPECIFIED OTITIS MEDIA TYPE: Primary | ICD-10-CM

## 2025-02-09 LAB
APPEARANCE UR: ABNORMAL
BACTERIA #/AREA URNS AUTO: ABNORMAL /HPF
BILIRUB UR STRIP.AUTO-MCNC: NEGATIVE MG/DL
COLOR UR: YELLOW
FLUAV RNA RESP QL NAA+PROBE: NOT DETECTED
FLUBV RNA RESP QL NAA+PROBE: NOT DETECTED
GLUCOSE UR STRIP.AUTO-MCNC: NORMAL MG/DL
KETONES UR STRIP.AUTO-MCNC: ABNORMAL MG/DL
LEUKOCYTE ESTERASE UR QL STRIP.AUTO: ABNORMAL
MUCOUS THREADS #/AREA URNS AUTO: ABNORMAL /LPF
NITRITE UR QL STRIP.AUTO: NEGATIVE
PH UR STRIP.AUTO: 6 [PH]
PROT UR STRIP.AUTO-MCNC: ABNORMAL MG/DL
RBC # UR STRIP.AUTO: NEGATIVE MG/DL
RBC #/AREA URNS AUTO: ABNORMAL /HPF
S PYO DNA THROAT QL NAA+PROBE: NOT DETECTED
SARS-COV-2 RNA RESP QL NAA+PROBE: NOT DETECTED
SP GR UR STRIP.AUTO: 1.03
SQUAMOUS #/AREA URNS AUTO: ABNORMAL /HPF
UROBILINOGEN UR STRIP.AUTO-MCNC: NORMAL MG/DL
WBC #/AREA URNS AUTO: ABNORMAL /HPF

## 2025-02-09 PROCEDURE — 87086 URINE CULTURE/COLONY COUNT: CPT | Mod: ELYLAB | Performed by: EMERGENCY MEDICINE

## 2025-02-09 PROCEDURE — 81001 URINALYSIS AUTO W/SCOPE: CPT | Performed by: EMERGENCY MEDICINE

## 2025-02-09 PROCEDURE — 99283 EMERGENCY DEPT VISIT LOW MDM: CPT | Performed by: EMERGENCY MEDICINE

## 2025-02-09 PROCEDURE — 87651 STREP A DNA AMP PROBE: CPT | Performed by: EMERGENCY MEDICINE

## 2025-02-09 PROCEDURE — 2500000001 HC RX 250 WO HCPCS SELF ADMINISTERED DRUGS (ALT 637 FOR MEDICARE OP): Performed by: EMERGENCY MEDICINE

## 2025-02-09 PROCEDURE — 87636 SARSCOV2 & INF A&B AMP PRB: CPT | Performed by: EMERGENCY MEDICINE

## 2025-02-09 RX ORDER — WATER
200 LIQUID (ML) MISCELLANEOUS ONCE
Status: COMPLETED | OUTPATIENT
Start: 2025-02-09 | End: 2025-02-09

## 2025-02-09 RX ORDER — CEPHALEXIN 500 MG/1
500 CAPSULE ORAL 4 TIMES DAILY
Qty: 40 CAPSULE | Refills: 0 | Status: SHIPPED | OUTPATIENT
Start: 2025-02-09 | End: 2025-02-19

## 2025-02-09 RX ORDER — AMOXICILLIN 250 MG/1
500 CAPSULE ORAL EVERY 8 HOURS SCHEDULED
Status: DISCONTINUED | OUTPATIENT
Start: 2025-02-09 | End: 2025-02-09 | Stop reason: HOSPADM

## 2025-02-09 RX ADMIN — AMOXICILLIN 500 MG: 250 CAPSULE ORAL at 17:07

## 2025-02-09 RX ADMIN — Medication 200 ML: at 17:08

## 2025-02-09 ASSESSMENT — PAIN - FUNCTIONAL ASSESSMENT: PAIN_FUNCTIONAL_ASSESSMENT: 0-10

## 2025-02-09 ASSESSMENT — PAIN SCALES - GENERAL
PAINLEVEL_OUTOF10: 6
PAINLEVEL_OUTOF10: 0 - NO PAIN

## 2025-02-09 ASSESSMENT — COLUMBIA-SUICIDE SEVERITY RATING SCALE - C-SSRS
1. IN THE PAST MONTH, HAVE YOU WISHED YOU WERE DEAD OR WISHED YOU COULD GO TO SLEEP AND NOT WAKE UP?: NO
2. HAVE YOU ACTUALLY HAD ANY THOUGHTS OF KILLING YOURSELF?: NO
6. HAVE YOU EVER DONE ANYTHING, STARTED TO DO ANYTHING, OR PREPARED TO DO ANYTHING TO END YOUR LIFE?: NO

## 2025-02-09 ASSESSMENT — PAIN DESCRIPTION - LOCATION: LOCATION: EAR

## 2025-02-09 NOTE — ED PROVIDER NOTES
HPI   Chief Complaint   Patient presents with    Flu Symptoms     Cough, ear pain, sinu congestion       HPI: 29-year-old female G4, P2 at 32-3/7 weeks presents with left ear pain, sore throat cough congestion for 2 days.  She states that she does feel the baby move.  No vaginal bleeding or leakage of fluid.  No pelvic pain or contractions.    Family HX: Denies any significant/pertinent family history.  Social Hx: Denies ETOH or drug use.  Review of systems:  Gen.: No weight loss, fatigue, anorexia, insomnia, fever.   Eyes: No vision loss, double vision, drainage, eye pain.   ENT: No pharyngitis, dry mouth.   Cardiac: No chest pain, palpitations, syncope, near syncope.   Pulmonary: No shortness of breath, cough, hemoptysis.   Heme/lymph: No swollen glands, fever, bleeding.   GI: No abdominal pain, change in bowel habits, melena, hematemesis, hematochezia, nausea, vomiting, diarrhea.   : No discharge, dysuria, frequency, urgency, hematuria.   Musculoskeletal: No limb pain, joint pain, joint swelling.   Skin: No rashes.   Psych: No depression, anxiety, suicidality, homicidality.   Review of systems is otherwise negative unless stated above or in history of present illness.    Physical Exam:    Appearance: Alert, oriented , cooperative,  in no acute distress. Well nourished & well hydrated.    Skin: Intact,  dry skin, no lesions, rash, petechiae or purpura.     Eyes: PERRLA, EOMs intact,  Conjunctiva pink with no redness or exudates. Eyelids without lesions. No scleral icterus.     ENT: Hearing grossly intact. External auditory canals patent, left tympanic membrane red intact with visible landmarks.  Right TM is normal nares patent, mucus membranes moist. Dentition without lesions. Pharynx clear, uvula midline.     Neck: Supple, without meningismus. Thyroid not palpable. Trachea at midline. No lymphadenopathy.    Pulmonary: Clear bilaterally with good chest wall excursion. No rales, rhonchi or wheezing. No accessory  muscle use or stridor.    Cardiac: Normal S1, S2 without murmur, rub, gallop or extrasystole. No JVD, Carotids without bruits.    Abdomen: Soft, nontender, active bowel sounds.  No palpable organomegaly.  No rebound or guarding.  No CVA tenderness.  Gravid consistent with dates    Genitourinary: Exam deferred.    Musculoskeletal: Full range of motion. no pain, edema, or deformity. Pulses full and equal. No cyanosis, clubbing, or edema.    Neurological:  Cranial nerves II through XII are grossly intact, finger-nose touch is normal, normal sensation, no weakness, no focal findings identified.    Psychiatric: Appropriate mood and affect.     Medical Decision-Making:  Testing: We were able to obtain fetal heart tones.  Patient has had no bleeding or leakage of fluid.  She has had no pelvic pain or cramping.  No contractions.  She does have signs of acute UTI with leukocyte esterase and 6-10 WBCs.  This will be sent for culture.  She is negative for flu COVID and RSV.  She will be treated with antibiotics for the UTI and the otitis.  Follow-up with her OB/GYN.  Blood pressures are in the 90s however looking at old records this appears to be where her blood pressures have been on her prenatal visits.  Therefore I do not believe that she is hypotensive.  Patient is tolerating p.o.  On reevaluation she states that she feels improved and would like to go home.  Treatment:   Reevaluation:   Plan: Homegoing. Discussed differential. Will follow-up with the primary physician in the next 2-3 days. Return if worse. They understand return precautions and discharge instructions. Patient and family/friend/caregiver are in agreement with this plan.   Impression:   1. otitis  2.uti    Labs Reviewed  URINALYSIS WITH REFLEX CULTURE AND MICROSCOPIC - Abnormal     Color, Urine                  Yellow                 Appearance, Urine             Turbid (*)               Specific Gravity, Urine       1.032                  pH, Urine                      6.0                    Protein, Urine                50 (1+) (*)               Glucose, Urine                Normal                 Blood, Urine                  NEGATIVE                Ketones, Urine                40 (2+) (*)               Bilirubin, Urine              NEGATIVE                Urobilinogen, Urine           Normal                 Nitrite, Urine                NEGATIVE                Leukocyte Esterase, Urine     500 Yelitza/uL (*)            MICROSCOPIC ONLY, URINE - Abnormal     WBC, Urine                    6-10 (*)               RBC, Urine                    1-2                    Squamous Epithelial Cells, Urine   26-50 (1+)                Bacteria, Urine               1+ (*)                 Mucus, Urine                  4+                  GROUP A STREPTOCOCCUS, PCR - Normal     Group A Strep PCR                                 INFLUENZA A AND B PCR - Normal     Flu A Result                                         Flu B Result                                           Narrative: This assay is an in vitro diagnostic multiplex nucleic acid amplification test for the detection and discrimination of Influenza A & B from nasopharyngeal specimens, and has been validated for use at [facility]. Negative results do not preclude Influenza A/B infections, and should not be used as the sole basis for diagnosis, treatment, or other management decisions. If Influenza A/B and RSV PCR results are negative, testing for Parainfluenza virus, Adenovirus and Metapneumovirus is routinely performed for [facility] pediatric oncology and intensive care inpatients, and is available on other patients by placing an add-on request.  SARS-COV-2 PCR - Normal     Coronavirus 2019, PCR                                  Narrative: This assay is an FDA-cleared, in vitro diagnostic nucleic acid amplification test for the qualitative detection and differentiation of SARS CoV-2 from nasopharyngeal specimens  collected from individuals with signs and symptoms of respiratory tract infections, and has been validated for use at [facility]. Negative results do not preclude COVID-19 infections and should not be used as the sole basis for diagnosis, treatment, or other management decisions. Testing for SARS CoV-2 is recommended only for patients who meet current clinical and/or epidemiological criteria defined by federal, state, or local public health directives.  URINE CULTURE  URINALYSIS WITH REFLEX CULTURE AND MICROSCOPIC       Narrative: The following orders were created for panel order Urinalysis with Reflex Culture and Microscopic.                Procedure                               Abnormality         Status                                   ---------                               -----------         ------                                   Urinalysis with Reflex C...[[id number]]  Abnormal            Final result                             Extra Urine Gray Tube[[id number]]                            In process                                               Please view results for these tests on the individual orders.  EXTRA URINE GRAY TUBE     No orders to display                 Patient History   Past Medical History:   Diagnosis Date    Anemia     Complete or unspecified spontaneous  without complication 2020    Complete spontaneous     Depression     Encounter for examination of ears and hearing without abnormal findings 2016    Encounter for hearing evaluation    Female infertility     Otitis media, unspecified, right ear 10/20/2020    Otitis media, right    Pain in unspecified ankle and joints of unspecified foot 2021    Ankle pain    Personal history of other (healed) physical injury and trauma 2021    History of sprain of ankle    Personal history of other diseases of the digestive system 2017    History of oral lesions    Personal history of  other diseases of the digestive system 02/08/2020    History of dental abscess    Personal history of other diseases of the respiratory system 03/16/2020    History of pharyngitis    Possible exposure to STD 02/15/2024     Past Surgical History:   Procedure Laterality Date    CT ABDOMEN PELVIS ANGIOGRAM W AND/OR WO IV CONTRAST  5/1/2019    CT ABDOMEN PELVIS ANGIOGRAM W AND/OR WO IV CONTRAST 5/1/2019 [facility] ANCILLARY LEGACY    TONSILLECTOMY  05/23/2014    Tonsillectomy     No family history on file.  Social History     Tobacco Use    Smoking status: Never    Smokeless tobacco: Never   Vaping Use    Vaping status: Never Used   Substance Use Topics    Alcohol use: Not Currently    Drug use: Never       Physical Exam   ED Triage Vitals [02/09/25 1619]   Temperature Heart Rate Respirations BP   36.1 °C (97 °F) 72 18 95/54      Pulse Ox Temp Source Heart Rate Source Patient Position   99 % Temporal -- Sitting      BP Location FiO2 (%)     Right arm --       Physical Exam      ED Course & MDM   Diagnoses as of 02/09/25 2330   Acute otitis media, unspecified otitis media type   Acute UTI                 No data recorded                                 Medical Decision Making      Procedure  Procedures     Kendra Young MD  02/09/25 7150

## 2025-02-10 LAB — HOLD SPECIMEN: NORMAL

## 2025-02-11 LAB — BACTERIA UR CULT: NORMAL

## 2025-02-20 ENCOUNTER — HOSPITAL ENCOUNTER (INPATIENT)
Facility: HOSPITAL | Age: 30
End: 2025-02-20
Attending: STUDENT IN AN ORGANIZED HEALTH CARE EDUCATION/TRAINING PROGRAM | Admitting: STUDENT IN AN ORGANIZED HEALTH CARE EDUCATION/TRAINING PROGRAM
Payer: COMMERCIAL

## 2025-02-20 SDOH — SOCIAL STABILITY: SOCIAL INSECURITY: PHYSICAL ABUSE: DENIES

## 2025-02-20 SDOH — HEALTH STABILITY: MENTAL HEALTH

## 2025-02-20 SDOH — HEALTH STABILITY: MENTAL HEALTH: SUICIDAL BEHAVIOR (3 MONTHS): NO

## 2025-02-20 SDOH — SOCIAL STABILITY: SOCIAL INSECURITY: VERBAL ABUSE: DENIES

## 2025-02-20 SDOH — SOCIAL STABILITY: SOCIAL INSECURITY: ABUSE SCREEN: ADULT

## 2025-02-20 SDOH — SOCIAL STABILITY: SOCIAL INSECURITY: DO YOU FEEL ANYONE HAS EXPLOITED OR TAKEN ADVANTAGE OF YOU FINANCIALLY OR OF YOUR PERSONAL PROPERTY?: NO

## 2025-02-20 SDOH — SOCIAL STABILITY: SOCIAL INSECURITY: HAS ANYONE EVER THREATENED TO HURT YOUR FAMILY OR YOUR PETS?: NO

## 2025-02-20 SDOH — ECONOMIC STABILITY: HOUSING INSECURITY: DO YOU FEEL UNSAFE GOING BACK TO THE PLACE WHERE YOU ARE LIVING?: NO

## 2025-02-20 SDOH — HEALTH STABILITY: MENTAL HEALTH: HAVE YOU USED ANY SUBSTANCES (CANABIS, COCAINE, HEROIN, HALLUCINOGENS, INHALANTS, ETC.) IN THE PAST 12 MONTHS?: NO

## 2025-02-20 SDOH — HEALTH STABILITY: MENTAL HEALTH: NON-SPECIFIC ACTIVE SUICIDAL THOUGHTS (PAST 1 MONTH): NO

## 2025-02-20 SDOH — SOCIAL STABILITY: SOCIAL INSECURITY: HAVE YOU HAD ANY THOUGHTS OF HARMING ANYONE ELSE?: NO

## 2025-02-20 SDOH — SOCIAL STABILITY: SOCIAL INSECURITY: DOES ANYONE TRY TO KEEP YOU FROM HAVING/CONTACTING OTHER FRIENDS OR DOING THINGS OUTSIDE YOUR HOME?: NO

## 2025-02-20 SDOH — HEALTH STABILITY: MENTAL HEALTH: SUICIDAL BEHAVIOR (LIFETIME): YES

## 2025-02-20 SDOH — SOCIAL STABILITY: SOCIAL INSECURITY: ARE YOU OR HAVE YOU BEEN THREATENED OR ABUSED PHYSICALLY, EMOTIONALLY, OR SEXUALLY BY ANYONE?: NO

## 2025-02-20 SDOH — SOCIAL STABILITY: SOCIAL INSECURITY: HAVE YOU HAD THOUGHTS OF HARMING ANYONE ELSE?: NO

## 2025-02-20 SDOH — HEALTH STABILITY: MENTAL HEALTH: WERE YOU ABLE TO COMPLETE ALL THE BEHAVIORAL HEALTH SCREENINGS?: YES

## 2025-02-20 SDOH — HEALTH STABILITY: MENTAL HEALTH: HAVE YOU USED ANY PRESCRIPTION DRUGS OTHER THAN PRESCRIBED IN THE PAST 12 MONTHS?: NO

## 2025-02-20 SDOH — SOCIAL STABILITY: SOCIAL INSECURITY: ARE THERE ANY APPARENT SIGNS OF INJURIES/BEHAVIORS THAT COULD BE RELATED TO ABUSE/NEGLECT?: NO

## 2025-02-20 SDOH — HEALTH STABILITY: MENTAL HEALTH: WISH TO BE DEAD (PAST 1 MONTH): NO

## 2025-02-20 ASSESSMENT — LIFESTYLE VARIABLES
AUDIT-C TOTAL SCORE: 0
HOW MANY STANDARD DRINKS CONTAINING ALCOHOL DO YOU HAVE ON A TYPICAL DAY: PATIENT DOES NOT DRINK
AUDIT-C TOTAL SCORE: 0
HOW OFTEN DO YOU HAVE A DRINK CONTAINING ALCOHOL: NEVER
SKIP TO QUESTIONS 9-10: 1
HOW OFTEN DO YOU HAVE 6 OR MORE DRINKS ON ONE OCCASION: NEVER

## 2025-02-20 ASSESSMENT — PAIN SCALES - GENERAL: PAINLEVEL_OUTOF10: 8

## 2025-02-21 ENCOUNTER — ANESTHESIA EVENT (OUTPATIENT)
Dept: OBSTETRICS AND GYNECOLOGY | Facility: HOSPITAL | Age: 30
End: 2025-02-21
Payer: COMMERCIAL

## 2025-02-21 ENCOUNTER — APPOINTMENT (OUTPATIENT)
Dept: OBSTETRICS AND GYNECOLOGY | Facility: CLINIC | Age: 30
End: 2025-02-21
Payer: COMMERCIAL

## 2025-02-21 ENCOUNTER — ANESTHESIA (OUTPATIENT)
Dept: OBSTETRICS AND GYNECOLOGY | Facility: HOSPITAL | Age: 30
End: 2025-02-21
Payer: COMMERCIAL

## 2025-02-21 PROBLEM — H54.7 VISION LOSS: Status: ACTIVE | Noted: 2025-02-21

## 2025-02-21 PROBLEM — F43.10 PTSD (POST-TRAUMATIC STRESS DISORDER): Status: ACTIVE | Noted: 2025-02-21

## 2025-02-21 LAB
ABO GROUP (TYPE) IN BLOOD: NORMAL
ANTIBODY SCREEN: NORMAL
ERYTHROCYTE [DISTWIDTH] IN BLOOD BY AUTOMATED COUNT: 14.2 % (ref 11.5–14.5)
HCT VFR BLD AUTO: 27.6 % (ref 36–46)
HGB BLD-MCNC: 8.7 G/DL (ref 12–16)
MCH RBC QN AUTO: 25.4 PG (ref 26–34)
MCHC RBC AUTO-ENTMCNC: 31.5 G/DL (ref 32–36)
MCV RBC AUTO: 81 FL (ref 80–100)
NRBC BLD-RTO: 0.4 /100 WBCS (ref 0–0)
PLATELET # BLD AUTO: 199 X10*3/UL (ref 150–450)
RBC # BLD AUTO: 3.43 X10*6/UL (ref 4–5.2)
RH FACTOR (ANTIGEN D): NORMAL
TREPONEMA PALLIDUM IGG+IGM AB [PRESENCE] IN SERUM OR PLASMA BY IMMUNOASSAY: NONREACTIVE
WBC # BLD AUTO: 10.6 X10*3/UL (ref 4.4–11.3)

## 2025-02-21 PROCEDURE — 2500000001 HC RX 250 WO HCPCS SELF ADMINISTERED DRUGS (ALT 637 FOR MEDICARE OP): Mod: SE

## 2025-02-21 PROCEDURE — 85027 COMPLETE CBC AUTOMATED: CPT | Performed by: STUDENT IN AN ORGANIZED HEALTH CARE EDUCATION/TRAINING PROGRAM

## 2025-02-21 PROCEDURE — 99222 1ST HOSP IP/OBS MODERATE 55: CPT

## 2025-02-21 PROCEDURE — 86780 TREPONEMA PALLIDUM: CPT | Performed by: STUDENT IN AN ORGANIZED HEALTH CARE EDUCATION/TRAINING PROGRAM

## 2025-02-21 PROCEDURE — 7210000002 HC LABOR PER HOUR

## 2025-02-21 PROCEDURE — 1210000001 HC SEMI-PRIVATE ROOM DAILY

## 2025-02-21 PROCEDURE — 2500000004 HC RX 250 GENERAL PHARMACY W/ HCPCS (ALT 636 FOR OP/ED): Mod: SE

## 2025-02-21 PROCEDURE — 99214 OFFICE O/P EST MOD 30 MIN: CPT | Mod: 25

## 2025-02-21 PROCEDURE — 87081 CULTURE SCREEN ONLY: CPT | Performed by: STUDENT IN AN ORGANIZED HEALTH CARE EDUCATION/TRAINING PROGRAM

## 2025-02-21 PROCEDURE — 82728 ASSAY OF FERRITIN: CPT

## 2025-02-21 PROCEDURE — 2500000004 HC RX 250 GENERAL PHARMACY W/ HCPCS (ALT 636 FOR OP/ED): Mod: SE | Performed by: STUDENT IN AN ORGANIZED HEALTH CARE EDUCATION/TRAINING PROGRAM

## 2025-02-21 PROCEDURE — 59025 FETAL NON-STRESS TEST: CPT | Mod: GC

## 2025-02-21 PROCEDURE — 59025 FETAL NON-STRESS TEST: CPT

## 2025-02-21 PROCEDURE — 86901 BLOOD TYPING SEROLOGIC RH(D): CPT | Performed by: STUDENT IN AN ORGANIZED HEALTH CARE EDUCATION/TRAINING PROGRAM

## 2025-02-21 PROCEDURE — 59050 FETAL MONITOR W/REPORT: CPT

## 2025-02-21 PROCEDURE — 36415 COLL VENOUS BLD VENIPUNCTURE: CPT | Performed by: STUDENT IN AN ORGANIZED HEALTH CARE EDUCATION/TRAINING PROGRAM

## 2025-02-21 PROCEDURE — 83550 IRON BINDING TEST: CPT

## 2025-02-21 RX ORDER — OXYTOCIN/0.9 % SODIUM CHLORIDE 30/500 ML
60 PLASTIC BAG, INJECTION (ML) INTRAVENOUS ONCE AS NEEDED
Status: DISCONTINUED | OUTPATIENT
Start: 2025-02-21 | End: 2025-02-21 | Stop reason: HOSPADM

## 2025-02-21 RX ORDER — HYDRALAZINE HYDROCHLORIDE 20 MG/ML
5 INJECTION INTRAMUSCULAR; INTRAVENOUS ONCE AS NEEDED
Status: DISCONTINUED | OUTPATIENT
Start: 2025-02-21 | End: 2025-02-21 | Stop reason: HOSPADM

## 2025-02-21 RX ORDER — SIMETHICONE 80 MG
80 TABLET,CHEWABLE ORAL 4 TIMES DAILY PRN
Status: DISCONTINUED | OUTPATIENT
Start: 2025-02-21 | End: 2025-02-24 | Stop reason: HOSPADM

## 2025-02-21 RX ORDER — MISOPROSTOL 200 UG/1
800 TABLET ORAL ONCE AS NEEDED
Status: DISCONTINUED | OUTPATIENT
Start: 2025-02-21 | End: 2025-02-21 | Stop reason: HOSPADM

## 2025-02-21 RX ORDER — DIPHENHYDRAMINE HCL 25 MG
25 CAPSULE ORAL ONCE
Status: COMPLETED | OUTPATIENT
Start: 2025-02-21 | End: 2025-02-21

## 2025-02-21 RX ORDER — METOCLOPRAMIDE 10 MG/1
10 TABLET ORAL ONCE
Status: COMPLETED | OUTPATIENT
Start: 2025-02-21 | End: 2025-02-21

## 2025-02-21 RX ORDER — SODIUM CHLORIDE, SODIUM LACTATE, POTASSIUM CHLORIDE, CALCIUM CHLORIDE 600; 310; 30; 20 MG/100ML; MG/100ML; MG/100ML; MG/100ML
75 INJECTION, SOLUTION INTRAVENOUS CONTINUOUS
Status: DISCONTINUED | OUTPATIENT
Start: 2025-02-21 | End: 2025-02-21

## 2025-02-21 RX ORDER — LABETALOL HYDROCHLORIDE 5 MG/ML
20 INJECTION, SOLUTION INTRAVENOUS ONCE AS NEEDED
Status: DISCONTINUED | OUTPATIENT
Start: 2025-02-21 | End: 2025-02-21 | Stop reason: HOSPADM

## 2025-02-21 RX ORDER — SODIUM CHLORIDE, SODIUM LACTATE, POTASSIUM CHLORIDE, CALCIUM CHLORIDE 600; 310; 30; 20 MG/100ML; MG/100ML; MG/100ML; MG/100ML
75 INJECTION, SOLUTION INTRAVENOUS CONTINUOUS
Status: ACTIVE | OUTPATIENT
Start: 2025-02-21 | End: 2025-02-22

## 2025-02-21 RX ORDER — PENICILLIN G 3000000 [IU]/50ML
3 INJECTION, SOLUTION INTRAVENOUS EVERY 4 HOURS
Status: DISCONTINUED | OUTPATIENT
Start: 2025-02-21 | End: 2025-02-21

## 2025-02-21 RX ORDER — ADHESIVE BANDAGE
10 BANDAGE TOPICAL
Status: DISCONTINUED | OUTPATIENT
Start: 2025-02-21 | End: 2025-02-24 | Stop reason: HOSPADM

## 2025-02-21 RX ORDER — TRANEXAMIC ACID 100 MG/ML
1000 INJECTION, SOLUTION INTRAVENOUS ONCE AS NEEDED
Status: DISCONTINUED | OUTPATIENT
Start: 2025-02-21 | End: 2025-02-21 | Stop reason: HOSPADM

## 2025-02-21 RX ORDER — HYDRALAZINE HYDROCHLORIDE 20 MG/ML
5 INJECTION INTRAMUSCULAR; INTRAVENOUS ONCE AS NEEDED
Status: DISCONTINUED | OUTPATIENT
Start: 2025-02-21 | End: 2025-02-24 | Stop reason: HOSPADM

## 2025-02-21 RX ORDER — BETAMETHASONE SODIUM PHOSPHATE AND BETAMETHASONE ACETATE 3; 3 MG/ML; MG/ML
12 INJECTION, SUSPENSION INTRA-ARTICULAR; INTRALESIONAL; INTRAMUSCULAR; SOFT TISSUE EVERY 24 HOURS
Status: DISCONTINUED | OUTPATIENT
Start: 2025-02-21 | End: 2025-02-21

## 2025-02-21 RX ORDER — TERBUTALINE SULFATE 1 MG/ML
0.25 INJECTION SUBCUTANEOUS ONCE AS NEEDED
Status: DISCONTINUED | OUTPATIENT
Start: 2025-02-21 | End: 2025-02-21 | Stop reason: HOSPADM

## 2025-02-21 RX ORDER — ONDANSETRON 4 MG/1
4 TABLET, FILM COATED ORAL EVERY 6 HOURS PRN
Status: DISCONTINUED | OUTPATIENT
Start: 2025-02-21 | End: 2025-02-24 | Stop reason: HOSPADM

## 2025-02-21 RX ORDER — ACETAMINOPHEN 325 MG/1
975 TABLET ORAL ONCE
Status: COMPLETED | OUTPATIENT
Start: 2025-02-21 | End: 2025-02-21

## 2025-02-21 RX ORDER — ONDANSETRON 4 MG/1
4 TABLET, FILM COATED ORAL EVERY 6 HOURS PRN
Status: DISCONTINUED | OUTPATIENT
Start: 2025-02-21 | End: 2025-02-21

## 2025-02-21 RX ORDER — LOPERAMIDE HYDROCHLORIDE 2 MG/1
4 CAPSULE ORAL EVERY 2 HOUR PRN
Status: DISCONTINUED | OUTPATIENT
Start: 2025-02-21 | End: 2025-02-21 | Stop reason: HOSPADM

## 2025-02-21 RX ORDER — FAMOTIDINE 10 MG/ML
20 INJECTION, SOLUTION INTRAVENOUS ONCE
Status: COMPLETED | OUTPATIENT
Start: 2025-02-21 | End: 2025-02-21

## 2025-02-21 RX ORDER — LIDOCAINE HYDROCHLORIDE 10 MG/ML
30 INJECTION, SOLUTION INFILTRATION; PERINEURAL ONCE AS NEEDED
Status: DISCONTINUED | OUTPATIENT
Start: 2025-02-21 | End: 2025-02-21 | Stop reason: HOSPADM

## 2025-02-21 RX ORDER — METHYLERGONOVINE MALEATE 0.2 MG/ML
0.2 INJECTION INTRAVENOUS ONCE AS NEEDED
Status: DISCONTINUED | OUTPATIENT
Start: 2025-02-21 | End: 2025-02-21 | Stop reason: HOSPADM

## 2025-02-21 RX ORDER — OXYTOCIN 10 [USP'U]/ML
10 INJECTION, SOLUTION INTRAMUSCULAR; INTRAVENOUS ONCE AS NEEDED
Status: DISCONTINUED | OUTPATIENT
Start: 2025-02-21 | End: 2025-02-21 | Stop reason: HOSPADM

## 2025-02-21 RX ORDER — LIDOCAINE HYDROCHLORIDE 10 MG/ML
0.5 INJECTION, SOLUTION INFILTRATION; PERINEURAL ONCE AS NEEDED
Status: DISCONTINUED | OUTPATIENT
Start: 2025-02-21 | End: 2025-02-24 | Stop reason: HOSPADM

## 2025-02-21 RX ORDER — NIFEDIPINE 10 MG/1
10 CAPSULE ORAL ONCE AS NEEDED
Status: DISCONTINUED | OUTPATIENT
Start: 2025-02-21 | End: 2025-02-24 | Stop reason: HOSPADM

## 2025-02-21 RX ORDER — CARBOPROST TROMETHAMINE 250 UG/ML
250 INJECTION, SOLUTION INTRAMUSCULAR ONCE AS NEEDED
Status: DISCONTINUED | OUTPATIENT
Start: 2025-02-21 | End: 2025-02-21 | Stop reason: HOSPADM

## 2025-02-21 RX ORDER — LABETALOL HYDROCHLORIDE 5 MG/ML
20 INJECTION, SOLUTION INTRAVENOUS ONCE AS NEEDED
Status: DISCONTINUED | OUTPATIENT
Start: 2025-02-21 | End: 2025-02-24 | Stop reason: HOSPADM

## 2025-02-21 RX ORDER — ONDANSETRON HYDROCHLORIDE 2 MG/ML
4 INJECTION, SOLUTION INTRAVENOUS EVERY 6 HOURS PRN
Status: DISCONTINUED | OUTPATIENT
Start: 2025-02-21 | End: 2025-02-24 | Stop reason: HOSPADM

## 2025-02-21 RX ORDER — BETAMETHASONE SODIUM PHOSPHATE AND BETAMETHASONE ACETATE 3; 3 MG/ML; MG/ML
12 INJECTION, SUSPENSION INTRA-ARTICULAR; INTRALESIONAL; INTRAMUSCULAR; SOFT TISSUE EVERY 24 HOURS
Status: COMPLETED | OUTPATIENT
Start: 2025-02-22 | End: 2025-02-22

## 2025-02-21 RX ORDER — POLYETHYLENE GLYCOL 3350 17 G/17G
17 POWDER, FOR SOLUTION ORAL 2 TIMES DAILY PRN
Status: DISCONTINUED | OUTPATIENT
Start: 2025-02-21 | End: 2025-02-24 | Stop reason: HOSPADM

## 2025-02-21 RX ORDER — ONDANSETRON HYDROCHLORIDE 2 MG/ML
4 INJECTION, SOLUTION INTRAVENOUS EVERY 6 HOURS PRN
Status: DISCONTINUED | OUTPATIENT
Start: 2025-02-21 | End: 2025-02-21

## 2025-02-21 RX ORDER — NIFEDIPINE 10 MG/1
10 CAPSULE ORAL ONCE AS NEEDED
Status: DISCONTINUED | OUTPATIENT
Start: 2025-02-21 | End: 2025-02-21 | Stop reason: HOSPADM

## 2025-02-21 RX ADMIN — ACETAMINOPHEN 975 MG: 325 TABLET ORAL at 12:45

## 2025-02-21 RX ADMIN — PENICILLIN G 3 MILLION UNITS: 3000000 INJECTION, SOLUTION INTRAVENOUS at 12:45

## 2025-02-21 RX ADMIN — FAMOTIDINE 20 MG: 10 INJECTION INTRAVENOUS at 02:04

## 2025-02-21 RX ADMIN — PENICILLIN G 3 MILLION UNITS: 3000000 INJECTION, SOLUTION INTRAVENOUS at 04:54

## 2025-02-21 RX ADMIN — BETAMETHASONE SODIUM PHOSPHATE AND BETAMETHASONE ACETATE 12 MG: 3; 3 INJECTION, SUSPENSION INTRA-ARTICULAR; INTRALESIONAL; INTRAMUSCULAR at 01:06

## 2025-02-21 RX ADMIN — PENICILLIN G 3 MILLION UNITS: 3000000 INJECTION, SOLUTION INTRAVENOUS at 09:05

## 2025-02-21 RX ADMIN — SODIUM CHLORIDE, POTASSIUM CHLORIDE, SODIUM LACTATE AND CALCIUM CHLORIDE 75 ML/HR: 600; 310; 30; 20 INJECTION, SOLUTION INTRAVENOUS at 00:53

## 2025-02-21 RX ADMIN — PENICILLIN G POTASSIUM 5 MILLION UNITS: 5000000 INJECTION, POWDER, FOR SOLUTION INTRAMUSCULAR; INTRAVENOUS at 01:01

## 2025-02-21 RX ADMIN — SODIUM CHLORIDE, POTASSIUM CHLORIDE, SODIUM LACTATE AND CALCIUM CHLORIDE 75 ML/HR: 600; 310; 30; 20 INJECTION, SOLUTION INTRAVENOUS at 09:05

## 2025-02-21 RX ADMIN — SODIUM CHLORIDE, POTASSIUM CHLORIDE, SODIUM LACTATE AND CALCIUM CHLORIDE 500 ML: 600; 310; 30; 20 INJECTION, SOLUTION INTRAVENOUS at 06:35

## 2025-02-21 RX ADMIN — DIPHENHYDRAMINE HYDROCHLORIDE 25 MG: 25 CAPSULE ORAL at 16:12

## 2025-02-21 RX ADMIN — METOCLOPRAMIDE 10 MG: 10 TABLET ORAL at 16:12

## 2025-02-21 SDOH — SOCIAL STABILITY: SOCIAL INSECURITY
WITHIN THE LAST YEAR, HAVE YOU BEEN RAPED OR FORCED TO HAVE ANY KIND OF SEXUAL ACTIVITY BY YOUR PARTNER OR EX-PARTNER?: NO

## 2025-02-21 SDOH — ECONOMIC STABILITY: FOOD INSECURITY: WITHIN THE PAST 12 MONTHS, THE FOOD YOU BOUGHT JUST DIDN'T LAST AND YOU DIDN'T HAVE MONEY TO GET MORE.: NEVER TRUE

## 2025-02-21 SDOH — ECONOMIC STABILITY: FOOD INSECURITY: HOW HARD IS IT FOR YOU TO PAY FOR THE VERY BASICS LIKE FOOD, HOUSING, MEDICAL CARE, AND HEATING?: NOT HARD AT ALL

## 2025-02-21 SDOH — ECONOMIC STABILITY: FOOD INSECURITY: WITHIN THE PAST 12 MONTHS, YOU WORRIED THAT YOUR FOOD WOULD RUN OUT BEFORE YOU GOT THE MONEY TO BUY MORE.: NEVER TRUE

## 2025-02-21 SDOH — SOCIAL STABILITY: SOCIAL INSECURITY
WITHIN THE LAST YEAR, HAVE YOU BEEN KICKED, HIT, SLAPPED, OR OTHERWISE PHYSICALLY HURT BY YOUR PARTNER OR EX-PARTNER?: NO

## 2025-02-21 SDOH — SOCIAL STABILITY: SOCIAL INSECURITY: WITHIN THE LAST YEAR, HAVE YOU BEEN HUMILIATED OR EMOTIONALLY ABUSED IN OTHER WAYS BY YOUR PARTNER OR EX-PARTNER?: NO

## 2025-02-21 SDOH — SOCIAL STABILITY: SOCIAL INSECURITY: WITHIN THE LAST YEAR, HAVE YOU BEEN AFRAID OF YOUR PARTNER OR EX-PARTNER?: NO

## 2025-02-21 SDOH — ECONOMIC STABILITY: TRANSPORTATION INSECURITY: IN THE PAST 12 MONTHS, HAS LACK OF TRANSPORTATION KEPT YOU FROM MEDICAL APPOINTMENTS OR FROM GETTING MEDICATIONS?: NO

## 2025-02-21 SDOH — HEALTH STABILITY: MENTAL HEALTH: CURRENT SMOKER: 0

## 2025-02-21 ASSESSMENT — ACTIVITIES OF DAILY LIVING (ADL): LACK_OF_TRANSPORTATION: NO

## 2025-02-21 ASSESSMENT — PAIN SCALES - GENERAL
PAINLEVEL_OUTOF10: 8
PAINLEVEL_OUTOF10: 0 - NO PAIN
PAINLEVEL_OUTOF10: 5 - MODERATE PAIN
PAINLEVEL_OUTOF10: 7
PAINLEVEL_OUTOF10: 7
PAINLEVEL_OUTOF10: 5 - MODERATE PAIN

## 2025-02-21 ASSESSMENT — PAIN - FUNCTIONAL ASSESSMENT
PAIN_FUNCTIONAL_ASSESSMENT: 0-10
PAIN_FUNCTIONAL_ASSESSMENT: 0-10

## 2025-02-21 ASSESSMENT — PAIN DESCRIPTION - LOCATION: LOCATION: HEAD

## 2025-02-21 NOTE — SIGNIFICANT EVENT
Patient now with unchanged cervical exam for >12hrs. Continues to feel contractions, not increasing in intensity. Reports ctrxn are still about 5 min apart. FHT: 140/mod/+accel/-decel; Vardaman: q15min. Amniotic sac felt on exam. Given patient with membranes still intact and without cervical change will transfer to antepartum floor. Stalled PTL. Will continue to monitor or antepartum floor for change in maternal status. For AM NST. Next exam if change in maternal or fetal status. For 2nd dose of BMZ 2/22 0100AM. MFM team aware.     Discussed with Dr. Pan and Dr. Naeem Chatterjee MD  PGY1, Obstetrics and Gynecology

## 2025-02-21 NOTE — H&P
OB Admission H&P    Assessment/Plan    28yo  @ 34w2d by LMP c/w 13wk US p/f ctx.     PTL  - Admitted, consented, scanned - cephalic  - Cervix  /-2 with bulging bag (performed by Reina Lopez CNM)  - GBS collected, pending.   - Admission labs: CBC, T&S sent.   - Discussed option for BMZ, including benefits vs risks - benefits for surfactant production vs risks of  hypoglycemia possibly requiring NICU care and unknown long-term neurodevelopmental effects. Pt would like to receive BMZ. Will administer BMZ#1 now with second dose in 24 hours  - PCN for GBS unknown    Fetal Status  -NST reactive, reassuring   -Presentation cephalic based on ultrasound  -GBS collected on admission    Preg n/f:  - Asthma no meds  - Hx of VAVD in 1st delivery at [facility]  - Hx of cold sores, oral only, denies any hx of vulvar symptoms. Neg vulvar exam on admission.  - Hx PPD: to restart prozac which she has at home    Postpartum   -Contraception Plan:  postplacental paragard IUD    Pt seen and d/w Dr. Michelet Novak MD PGY-3      Subjective   28yo  @ 34w2d by LMP c/w 13wk US p/f ctx. Increasing painful throughout day. No VB, LOF. GFM.    Preg n/f:  - Asthma: no meds  - Hx of VAVD in 1st delivery at [facility]  - Hx of cold sores, oral only, denies any hx of vulvar symptoms.   - Hx PPD: to restart prozac which she has at home    OB History    Para Term  AB Living   4 2 2 0 1 2   SAB IAB Ectopic Multiple Live Births   1 0 0 0 1      # Outcome Date GA Lbr Paramjit/2nd Weight Sex Type Anes PTL Lv   4 Current            3 Term 23 39w4d    Vag-Spont   CATIA   2 SAB            1 Term                Past Surgical History:   Procedure Laterality Date    CT ABDOMEN PELVIS ANGIOGRAM W AND/OR WO IV CONTRAST  2019    CT ABDOMEN PELVIS ANGIOGRAM W AND/OR WO IV CONTRAST 2019 [facility]    TONSILLECTOMY  2014    Tonsillectomy       Social History     Tobacco Use    Smoking status: Never    Smokeless  tobacco: Never   Substance Use Topics    Alcohol use: Not Currently       Allergies   Allergen Reactions    Adhesive Tape-Silicones Rash       Medications Prior to Admission   Medication Sig Dispense Refill Last Dose/Taking    [] cephalexin (Keflex) 500 mg capsule Take 1 capsule (500 mg) by mouth 4 times a day for 10 days. 40 capsule 0     docusate sodium (Colace) 100 mg capsule Take 1 capsule (100 mg) by mouth 2 times a day. 180 capsule 1     ferrous sulfate 325 (65 Fe) MG EC tablet Take one tablet twice daily every other day. Do not crush, chew, or split. 90 tablet 2     FLUoxetine (PROzac) 20 mg tablet Take 1 tablet (20 mg) by mouth once daily.       fluticasone (Flonase) 50 mcg/actuation nasal spray Administer 1 spray into each nostril once daily. Shake gently. Before first use, prime pump. After use, clean tip and replace cap. 16 g 0      Objective     Last Vitals  Temp Pulse Resp BP MAP O2 Sat   36.8 °C (98.2 °F) 72 16 105/55 75 98 %     Blood Pressures         2025  2333             BP: 105/55             Physical Exam  General: NAD, mood appropriate  Cardiopulmonary: warm and well perfused, breathing comfortably on room air  Abdomen: Gravid, non-tender  Extremities: Symmetric  Speculum Exam: deferred  Vulvar exam: no lesions  Cervix: 4 /70 /-2 with bulging bag (performed by Reina Lopez CNM)    Chaperone Present: Yes.  Chaperone Name/Title: Arely Mederos DO  Examination Chaperoned: Entire Physical Exam     Fetal Monitoring  Baseline 130, pos accels, neg decels, mod lenny  Uterine Activity: q3 minutes  Interpretation: Reactive    Bedside ultrasound: Yes cephalic    Labs in chart were reviewed.          Prenatal labs reviewed, not remarkable.

## 2025-02-21 NOTE — SIGNIFICANT EVENT
"Labor Progress Note    Subjective: to room for fetal tachycardia    Objective:  Vitals: /57   Pulse 73   Temp 36.3 °C (97.3 °F) (Temporal)   Resp 18   Ht 1.473 m (4' 10\")   Wt 48.2 kg (106 lb 4.2 oz)   LMP 06/26/2024 (Exact Date)   SpO2 100%   BMI 22.21 kg/m²     SVE: 5/80/-1  FHT: 135/moderate/+accels, prolonged acceleration as below/-decels  [place]: 2-6 mins      A/P:  20 minute period of fetal tachycardia noted with FHT peak in 190s-200 that has since returned to baseline  Cervix unchanged on check  Normal maternal heart rate, no si/sx infection    Lexie Kruse MD, MPH  Obstetrics & Gynecology, PGY-1    "

## 2025-02-21 NOTE — SIGNIFICANT EVENT
Labor check    S: Patient resting in bed. Feels her contractions still, some are intense others are aren't. Patient reports she felt a trickle down her leg earlier. No big gush of fluid. Has not felt any additional leakage of fluid. Denies vaginal bleeding. Good fetal movement.     SVE: 5/80/-1  FHT: 135/mod/+accel/-decel  [place]: q13min     A/P: Dorothy is a 29 y.o.  at 34w2d undergoing IOL     PTL  - latent labor, PTL stalled   - GBS unknown on PCN  - epidural at patient request   - Sterile speculum exam performed due to patient feeling trickle of water down leg. No pooling seen on exam, normal physiological discharge. No fluid from cervix with valsalva. Ferning negative. Nitrazine positive.    -Low suspicion for rupture of membranes given negative pooling and ferning as well as bulging bag noted on exam.     Fetal Wellbeing  - CEFM, Cat I currently     Discussed with Dr. Naeem Chatterjee MD  PGY1, Obstetrics and Gynecology

## 2025-02-21 NOTE — PROGRESS NOTES
"CNM Brief Progress Note    Dorothy Drummond is a 29 y.o.  at 34w2d presenting to triage with increasingly painful contractions that began at 1500 on . She denies LOF, VB. Baby is moving normally    Objective  /55   Pulse 72   Temp 36.8 °C (98.2 °F) (Temporal)   Resp 16   Ht 1.473 m (4' 10\")   Wt 48.2 kg (106 lb 4.2 oz)   LMP 2024 (Exact Date)   SpO2 98%   BMI 22.21 kg/m²    FHT reactive  [place] 2-3 in 10 min  SVE 4-5/70/-2 with BBOW    A/P  28yo  at 34w2d in  labor  -MD team notified of pt status  -transfer to MD care    Shazia Lopez, APRN-CNM, APRN-CNP         "

## 2025-02-21 NOTE — ANESTHESIA PREPROCEDURE EVALUATION
Patient: Dorothy Drummond    Evaluation Method: In-person visit    Procedure Information    Date: 25  Procedure: Labor Consult         Relevant Problems   Anesthesia (within normal limits)      Cardiac (within normal limits)      Pulmonary  Uses inhaler when she gets sick   (+) Mild intermittent asthma without complication ([facility])      Neuro   (+) Anxiety and depression   (+) Depression affecting pregnancy ([facility])   (+) PTSD (post-traumatic stress disorder)      GI (within normal limits)      /Renal (within normal limits)      Liver (within normal limits)      Endocrine (within normal limits)      Hematology   (+) Anemia affecting pregnancy in third trimester ([facility])      Musculoskeletal (within normal limits)      HEENT   (+) Vision loss      ID (within normal limits)      Skin (within normal limits)      GYN   (+) 32 weeks gestation of pregnancy ([facility])       Clinical information reviewed:    Allergies  Meds               NPO Detail:  No data recorded     OB/Gyn Evaluation    Present Pregnancy    Patient is pregnant now.  (+) ,  labor   Obstetric History                Physical Exam    Airway  Mallampati: II  TM distance: >3 FB     Cardiovascular    Dental - normal exam     Pulmonary    Abdominal            Anesthesia Plan    History of general anesthesia?: yes  History of complications of general anesthesia?: no    ASA 2     epidural     The patient is not a current smoker.

## 2025-02-21 NOTE — PROGRESS NOTES
Intrapartum Progress Note    Assessment/Plan   Dorothy Drummond is a 29 y.o.  at 34w2d, TOMMY: 2025, by Last Menstrual Period admitted for  Labor    PTL  Cervical change from /-2 on presentation with bulging bag to /-1  Pain management: epidural if continues to progress   Discussed with patient recently checked so will defer exam at this time. Discussed if labor stalls will not augment labor unless ROM given gestational age of 34w2d.   GBS: unknown, collected, PCN ppx given gestational age   Next exam: as clinically indicated by maternal or fetal status    Fetal Status   CEFM, currently Category I    Pregnancy Notables:   Asthma no meds  Hx of VAVD in 1st delivery at [facility]  Hx of cold sores, oral only, denies any hx of vulvar symptoms. Neg vulvar exam on admission.  Hx PPD: to restart prozac which she has at home    Discussed with and to be seen by Dr. Naeem Chatterjee MD  PGY1, Obstetrics and Gynecology      Subjective   Reports contractions are becoming more intense. Denies LOF. No acte concerns.     Objective   Last Vitals:  Temp Pulse Resp BP MAP Pulse Ox   36.3 °C (97.3 °F) 81 18 102/57   99 %     Vitals Min/Max Last 24 Hours:  Temp  Min: 36.3 °C (97.3 °F)  Max: 36.9 °C (98.4 °F)  Pulse  Min: 68  Max: 97  Resp  Min: 16  Max: 18  BP  Min: 101/62  Max: 132/75    Intake/Output:  No intake or output data in the 24 hours ending 25 0748    Physical Examination:  General: no acute distress  HEENT: normocephalic, atraumatic  Heart: warm and well perfused  Lungs: breathing comfortably on room air  Abdomen: gravid  Extremities: moving all extremities  Neuro: awake and conversant  Psych: appropriate mood and affect    SVE: last /-1 @0645  FHT: 130/mod/+accel/-decel  [place]: q2-8min    Lab Review:  Labs in chart have been reviewed

## 2025-02-21 NOTE — SIGNIFICANT EVENT
SVE    Pt feeling increased pressure.    SVE 5/80/-1  FHT Baseline 130, pos accels, neg decels, mod lenny  [place]: ctx q3 mins    A/P:  - No cervical change from last exam but feeling more uncomfortable  - Cont PCN, s/p BMZ #1  - Cat I tracing  - Pt considering epidural    D/w Dr. Janice Novak MD PGY-3

## 2025-02-21 NOTE — CARE PLAN
The patient's goals for the shift include      The clinical goals for the shift include maintain pregnancy    Patient stable throughout time having her on Mac 4. Patient reports still having contractions, but they have not changed since what they were downstairs on Mac 2. VSS. , no complaints from patient at this time.

## 2025-02-21 NOTE — SIGNIFICANT EVENT
To bedside for increased pressure     SVE 5/80/-1  /mod/+accel/-decel  [place]: irregular    Unclear if cervical change given different examiner  Suspect pressure due to low station  CEFM, currently Cat I  Continue to monitor for cervical change  JENNIFER WELLS MD  PGY-4, Obstetrics and Gynecology

## 2025-02-22 LAB
FERRITIN SERPL-MCNC: 17 NG/ML (ref 8–150)
IRON SATN MFR SERPL: ABNORMAL %
IRON SERPL-MCNC: 19 UG/DL (ref 35–150)
TIBC SERPL-MCNC: ABNORMAL UG/DL
UIBC SERPL-MCNC: >450 UG/DL (ref 110–370)

## 2025-02-22 PROCEDURE — 2500000001 HC RX 250 WO HCPCS SELF ADMINISTERED DRUGS (ALT 637 FOR MEDICARE OP): Mod: SE

## 2025-02-22 PROCEDURE — 2500000004 HC RX 250 GENERAL PHARMACY W/ HCPCS (ALT 636 FOR OP/ED): Mod: SE

## 2025-02-22 PROCEDURE — 59025 FETAL NON-STRESS TEST: CPT | Mod: GC

## 2025-02-22 PROCEDURE — 1210000001 HC SEMI-PRIVATE ROOM DAILY

## 2025-02-22 RX ORDER — ACETAMINOPHEN 325 MG/1
975 TABLET ORAL EVERY 6 HOURS PRN
Status: DISCONTINUED | OUTPATIENT
Start: 2025-02-22 | End: 2025-02-24 | Stop reason: HOSPADM

## 2025-02-22 RX ORDER — MAGNESIUM SULFATE HEPTAHYDRATE 40 MG/ML
2 INJECTION, SOLUTION INTRAVENOUS ONCE
Status: COMPLETED | OUTPATIENT
Start: 2025-02-22 | End: 2025-02-22

## 2025-02-22 RX ORDER — METOCLOPRAMIDE 10 MG/1
10 TABLET ORAL EVERY 6 HOURS PRN
Status: DISCONTINUED | OUTPATIENT
Start: 2025-02-22 | End: 2025-02-24 | Stop reason: HOSPADM

## 2025-02-22 RX ORDER — CYCLOBENZAPRINE HCL 10 MG
10 TABLET ORAL ONCE
Status: COMPLETED | OUTPATIENT
Start: 2025-02-22 | End: 2025-02-22

## 2025-02-22 RX ORDER — CAFFEINE 200 MG
100 TABLET ORAL ONCE
Status: COMPLETED | OUTPATIENT
Start: 2025-02-22 | End: 2025-02-22

## 2025-02-22 RX ORDER — SUMATRIPTAN 6 MG/.5ML
6 INJECTION, SOLUTION SUBCUTANEOUS ONCE
Status: DISCONTINUED | OUTPATIENT
Start: 2025-02-22 | End: 2025-02-22

## 2025-02-22 RX ORDER — METOCLOPRAMIDE HYDROCHLORIDE 5 MG/ML
10 INJECTION INTRAMUSCULAR; INTRAVENOUS EVERY 6 HOURS PRN
Status: DISCONTINUED | OUTPATIENT
Start: 2025-02-22 | End: 2025-02-24 | Stop reason: HOSPADM

## 2025-02-22 RX ORDER — DIPHENHYDRAMINE HCL 25 MG
25 CAPSULE ORAL EVERY 6 HOURS PRN
Status: DISCONTINUED | OUTPATIENT
Start: 2025-02-22 | End: 2025-02-24 | Stop reason: HOSPADM

## 2025-02-22 RX ADMIN — CYCLOBENZAPRINE 10 MG: 10 TABLET, FILM COATED ORAL at 18:43

## 2025-02-22 RX ADMIN — BETAMETHASONE SODIUM PHOSPHATE AND BETAMETHASONE ACETATE 12 MG: 3; 3 INJECTION, SUSPENSION INTRA-ARTICULAR; INTRALESIONAL; INTRAMUSCULAR at 00:32

## 2025-02-22 RX ADMIN — CAFFEINE 100 MG: 200 TABLET ORAL at 15:25

## 2025-02-22 RX ADMIN — ACETAMINOPHEN 975 MG: 325 TABLET ORAL at 03:32

## 2025-02-22 RX ADMIN — PRENATAL VIT W/ FE FUMARATE-FA TAB 27-0.8 MG 1 TABLET: 27-0.8 TAB at 08:45

## 2025-02-22 RX ADMIN — DIPHENHYDRAMINE HYDROCHLORIDE 25 MG: 25 CAPSULE ORAL at 13:14

## 2025-02-22 RX ADMIN — ACETAMINOPHEN 975 MG: 325 TABLET ORAL at 13:13

## 2025-02-22 RX ADMIN — METOCLOPRAMIDE 10 MG: 5 INJECTION, SOLUTION INTRAMUSCULAR; INTRAVENOUS at 14:25

## 2025-02-22 RX ADMIN — MAGNESIUM SULFATE HEPTAHYDRATE 2 G: 40 INJECTION, SOLUTION INTRAVENOUS at 21:53

## 2025-02-22 RX ADMIN — ACETAMINOPHEN 975 MG: 325 TABLET ORAL at 18:48

## 2025-02-22 RX ADMIN — METOCLOPRAMIDE 10 MG: 5 INJECTION, SOLUTION INTRAMUSCULAR; INTRAVENOUS at 08:45

## 2025-02-22 RX ADMIN — DIPHENHYDRAMINE HYDROCHLORIDE 25 MG: 25 CAPSULE ORAL at 03:32

## 2025-02-22 ASSESSMENT — PAIN - FUNCTIONAL ASSESSMENT
PAIN_FUNCTIONAL_ASSESSMENT: 0-10

## 2025-02-22 ASSESSMENT — PAIN DESCRIPTION - DESCRIPTORS
DESCRIPTORS: HEADACHE
DESCRIPTORS: ACHING
DESCRIPTORS: ACHING
DESCRIPTORS: HEADACHE
DESCRIPTORS: ACHING
DESCRIPTORS: HEADACHE
DESCRIPTORS: ACHING
DESCRIPTORS: PRESSURE
DESCRIPTORS: HEADACHE
DESCRIPTORS: ACHING

## 2025-02-22 ASSESSMENT — PAIN SCALES - GENERAL
PAINLEVEL_OUTOF10: 7
PAINLEVEL_OUTOF10: 8
PAINLEVEL_OUTOF10: 6
PAINLEVEL_OUTOF10: 6
PAINLEVEL_OUTOF10: 7
PAINLEVEL_OUTOF10: 7
PAINLEVEL_OUTOF10: 6
PAINLEVEL_OUTOF10: 6
PAINLEVEL_OUTOF10: 5 - MODERATE PAIN
PAINLEVEL_OUTOF10: 7
PAINLEVEL_OUTOF10: 0 - NO PAIN
PAINLEVEL_OUTOF10: 8
PAINLEVEL_OUTOF10: 4

## 2025-02-22 ASSESSMENT — PAIN DESCRIPTION - LOCATION
LOCATION: HEAD
LOCATION: BACK

## 2025-02-22 ASSESSMENT — PAIN SCALES - PAIN ASSESSMENT IN ADVANCED DEMENTIA (PAINAD): TOTALSCORE: MEDICATION (SEE MAR);HEAT APPLIED

## 2025-02-22 NOTE — SIGNIFICANT EVENT
Safe-T  [place] Suicide Severity Rating Scale (Screener/Recent Self-Report)  1. Wish to be Dead (Past 1 Month): No  2. Non-Specific Active Suicidal Thoughts (Past 1 Month): No  6. Suicidal Behavior (Lifetime): Yes  6. Suicidal Behavior (3 Months): No  6. Suicidal Behavior (Description): SI attempt in 2023  Calculated C-SSRS Risk Score (Lifetime/Recent): Moderate Risk    Plan:  - Assessment completed given h/o suicide attempt. Per patient, indicant happened in setting of death of  3 months after giving birth therefore identifying those stressors as her triggers. Currently denies SI currently, therefore no concerns   - Will continue to provide emotional support sim Mart MD, PGY-3

## 2025-02-22 NOTE — CONSULTS
JOSE Consult  Consulting provider: MD Michelet   Reason for Consult: PTL     Admission HPI: 28yo  @ 34w2d by LMP c/w 13wk US p/f ctx. Increasing painful throughout day. No VB, LOF. GFM.    AM Update: Patient observed on L&D and cervix found not be changed for 14 hours at 5/80/-1, therefore transferred up to antepartum units. This morning, she denies VB, LOF and contractions. Having a HA that is a 4/10 after Tylenol, Reglan and benadryl. Reports daily intake of caffeine use. Willing to try caffeine pill if not resolved.      Preg n/f:  - Asthma: no meds  - Hx of VAVD in 1st delivery at [facility]  - Hx of cold sores, oral only, denies any hx of vulvar symptoms.   - Hx PPD: to restart prozac which she has at home    Pregnancy Problems (from 24 to present)       Problem Noted Diagnosed Resolved    Anemia affecting pregnancy in third trimester ([facility]) 1/10/2025 by LORENZO West  No    Priority:  Medium       Overview Signed 1/10/2025  8:57 AM by LORENZO West     Hgb 9.3, ferritin 13. Iron rx'd. Repeat CBC and retic after two weeks of compliance         Depression affecting pregnancy ([facility]) 2024 by LORENZO West  No    Priority:  Medium       Overview Signed 2024  5:04 PM by LORENZO West     On proscac 20 mg daily and sees psych and therapist through [facility]         32 weeks gestation of pregnancy ([facility]) 2024 by LORENZO West  No    Priority:  Medium       Overview Addendum 1/10/2025  4:47 PM by LORENZO West       Desired provider in labor: [x] CNM  [] Physician  [x] Blood Products: [x] Yes, accepts [] No, needs counseling  [x] Initial BMI: 19.86   [x] Prenatal Labs: wnl  [x] Cervical Cancer Screening up to date: 10/2024  [x] Rh status: O+  [x] Genetic Screening:  risk reducing, having a girl  [x] NT US: (11-13 wks): wnl  [] Baby ASA (if indicated): n/a  [x] Pregnancy dated by: LMP    [x] Anatomy US: (19-20  wks)  [] Federal Sterilization consent signed (if indicated):  [x] 1hr GCT at 24-28wks: wnl  [] Rhogam (if indicated):   [] Fetal Surveillance (if indicated):  [x] Tdap (27-32 wks, may be given up to 36 wks if initial window missed): 1/10/25  [] RSV (32-36 wks) (Sept. to end ):   [x] Flu Vaccine: 10/4/24    [x] Breastfeeding:  [x] Postpartum Birth control method: paragard  [] GBS at 36 - 37 wks:  [] 39 weeks discussion of IOL vs. Expectant management:  [] Mode of delivery ( anticipated ):          Mild intermittent asthma without complication ([facility]) 2/15/2024 by Eva Langley MD  No    Priority:  Medium       Overview Signed 2024  5:09 PM by SAMIRA West-LAVINIA     No history of hospitalizations or intubation.                     Obstetrical History   OB History          4    Para   2    Term   2            AB   1    Living   2         SAB   1    IAB        Ectopic        Multiple        Live Births   1                 Past Medical History  Past Medical History:   Diagnosis Date    Anemia     Complete or unspecified spontaneous  without complication 2020    Complete spontaneous     Depression     Encounter for examination of ears and hearing without abnormal findings 2016    Encounter for hearing evaluation    Female infertility     Otitis media, unspecified, right ear 10/20/2020    Otitis media, right    Pain in unspecified ankle and joints of unspecified foot 2021    Ankle pain    Personal history of other (healed) physical injury and trauma 2021    History of sprain of ankle    Personal history of other diseases of the digestive system 2017    History of oral lesions    Personal history of other diseases of the digestive system 2020    History of dental abscess    Personal history of other diseases of the respiratory system 2020    History of pharyngitis    Possible exposure to STD 02/15/2024        Past Surgical  History   Past Surgical History:   Procedure Laterality Date    CT ABDOMEN PELVIS ANGIOGRAM W AND/OR WO IV CONTRAST  2019    CT ABDOMEN PELVIS ANGIOGRAM W AND/OR WO IV CONTRAST 2019 [facility]    TONSILLECTOMY  2014    Tonsillectomy       Social History  Social History     Socioeconomic History    Marital status:      Spouse name: Not on file    Number of children: Not on file    Years of education: Not on file    Highest education level: Not on file   Occupational History    Not on file   Tobacco Use    Smoking status: Never    Smokeless tobacco: Never   Vaping Use    Vaping status: Never Used   Substance and Sexual Activity    Alcohol use: Not Currently    Drug use: Never    Sexual activity: Yes     Partners: Male     Birth control/protection: None   Other Topics Concern    Not on file   Social History Narrative    Not on file     Social Drivers of Health     Financial Resource Strain: Low Risk  (2025)    Overall Financial Resource Strain (CARDIA)     Difficulty of Paying Living Expenses: Not hard at all   Food Insecurity: No Food Insecurity (2025)    Hunger Vital Sign     Worried About Running Out of Food in the Last Year: Never true     Ran Out of Food in the Last Year: Never true   Transportation Needs: No Transportation Needs (2025)    PRAPARE - Transportation     Lack of Transportation (Medical): No     Lack of Transportation (Non-Medical): No   Physical Activity: Not on file   Stress: Not on file   Social Connections: Not on file   Intimate Partner Violence: Not At Risk (2025)    Humiliation, Afraid, Rape, and Kick questionnaire     Fear of Current or Ex-Partner: No     Emotionally Abused: No     Physically Abused: No     Sexually Abused: No       Allergies  Allergies   Allergen Reactions    Adhesive Tape-Silicones Rash       Medications  Medications Prior to Admission   Medication Sig Dispense Refill Last Dose/Taking    [] cephalexin (Keflex) 500 mg  "capsule Take 1 capsule (500 mg) by mouth 4 times a day for 10 days. 40 capsule 0     docusate sodium (Colace) 100 mg capsule Take 1 capsule (100 mg) by mouth 2 times a day. 180 capsule 1     ferrous sulfate 325 (65 Fe) MG EC tablet Take one tablet twice daily every other day. Do not crush, chew, or split. 90 tablet 2     FLUoxetine (PROzac) 20 mg tablet Take 1 tablet (20 mg) by mouth once daily.       fluticasone (Flonase) 50 mcg/actuation nasal spray Administer 1 spray into each nostril once daily. Shake gently. Before first use, prime pump. After use, clean tip and replace cap. 16 g 0        OBJECTIVE:   BP 91/50   Pulse 76   Temp 36.4 °C (97.5 °F) (Temporal)   Resp 16   Ht 1.473 m (4' 10\")   Wt 48.2 kg (106 lb 4.2 oz)   LMP 2024 (Exact Date)   SpO2 97%   BMI 22.21 kg/m²    Temp  Min: 36.4 °C (97.5 °F)  Max: 37.2 °C (99 °F)  Pulse  Min: 65  Max: 93  BP  Min: 91/50  Max: 109/60    Physical exam:  General:  AAOx3, No acute distress  Cardiovascular: Warm and well perfused  Respiratory: Normal respiratory effort on RA  Abdominal:  Soft, gravid    NST: 135/mod/+accel/-decel   [place]: no contractions      ASSESSMENT AND PLAN:     29 y.o.  at 34w3d by LMP c/w 13wk US p/f ctx. Increasing painful throughout day. No VB, LOF. GFM.    PTL  Patient presented with painful contractions, found to make change 4/70/-2 on presentation with bulging bag to 5/80/-1, exam unchanged on L&D for over 14 hours therefore transferred to Antepartum unit  - S/p BMZ - in anticipation for PTD   - Comfortable this morning, will recheck cervix as clinically indicated   - GBS unk, planning for PCN if laboring     Fetal status  - NST reactive this AM, continue daily NSTs while inpatient   - NICU to be consulted in the event that patient continues to labor     Routine  - Flu/TdaP 2025  - 1hr wnl   - BCM: ppIUD (paragard)    Dispo: pending stability     Pt seen and discussed with MFM Attending, Dr. Sonia ARREAGA" MD Brittny, PGY-3   [facility]  Pager [phone]

## 2025-02-22 NOTE — NURSING NOTE
Patient placed on continuous monitoring for NST after audible decels suspected by this nurse upon intermittent doppler for shift. Spo2 placed on finger. Maternal and FHR seemed different upon check.  notified. Nurse to perform NST. Would like to assess FHR through a couple contractions. Patient agrees with plan.

## 2025-02-22 NOTE — CARE PLAN
The patient's goals for the shift include      The clinical goals for the shift include stay pregnant      Problem: Antepartum  Goal: Maintain pregnancy as long as maternal and/or fetal condition is stable  Outcome: Progressing  Goal: Avoid/minimize constipation  Outcome: Progressing  Goal: No decrease in circulation/VTE  Outcome: Progressing  Goal: FHR remains reassuring  Outcome: Progressing  Goal: Minimize anxiety/maximize coping  Outcome: Progressing     Problem:  Labor/Prolonged Premature Rupture of Membranes  Goal: Fewer then 4-6 ct per hour  Outcome: Progressing     Problem: Pain - Adult  Goal: Verbalizes/displays adequate comfort level or baseline comfort level  Outcome: Progressing     Problem: Safety - Adult  Goal: Free from fall injury  Outcome: Progressing     Problem: Discharge Planning  Goal: Discharge to home or other facility with appropriate resources  Outcome: Progressing

## 2025-02-22 NOTE — CARE PLAN
The patient's goals for the shift include      The clinical goals for the shift include maintain pregnancy, reassuring fhr    Pt VVS, pt still reported contractions throughout the night , unchanged  and irregular, pt remained safe and free from injury through out shift, pt is currently  resting quietly and has no needs at this time  , will cont to monitor

## 2025-02-23 VITALS
DIASTOLIC BLOOD PRESSURE: 60 MMHG | TEMPERATURE: 99.3 F | SYSTOLIC BLOOD PRESSURE: 106 MMHG | OXYGEN SATURATION: 97 % | HEART RATE: 72 BPM | HEIGHT: 58 IN | BODY MASS INDEX: 22.31 KG/M2 | WEIGHT: 106.26 LBS | RESPIRATION RATE: 18 BRPM

## 2025-02-23 LAB — GP B STREP GENITAL QL CULT: NORMAL

## 2025-02-23 PROCEDURE — 2500000004 HC RX 250 GENERAL PHARMACY W/ HCPCS (ALT 636 FOR OP/ED): Mod: SE

## 2025-02-23 PROCEDURE — 59025 FETAL NON-STRESS TEST: CPT | Mod: GC,25

## 2025-02-23 PROCEDURE — 1210000001 HC SEMI-PRIVATE ROOM DAILY

## 2025-02-23 PROCEDURE — 2500000001 HC RX 250 WO HCPCS SELF ADMINISTERED DRUGS (ALT 637 FOR MEDICARE OP): Mod: SE

## 2025-02-23 RX ORDER — ACETAMINOPHEN 500 MG
5 TABLET ORAL DAILY
Status: DISCONTINUED | OUTPATIENT
Start: 2025-02-24 | End: 2025-02-24 | Stop reason: HOSPADM

## 2025-02-23 RX ORDER — DIPHENHYDRAMINE HYDROCHLORIDE 50 MG/ML
50 INJECTION INTRAMUSCULAR; INTRAVENOUS EVERY 5 MIN PRN
Status: DISCONTINUED | OUTPATIENT
Start: 2025-02-23 | End: 2025-02-24 | Stop reason: HOSPADM

## 2025-02-23 RX ORDER — DIPHENHYDRAMINE HYDROCHLORIDE 50 MG/ML
50 INJECTION INTRAMUSCULAR; INTRAVENOUS ONCE
Status: COMPLETED | OUTPATIENT
Start: 2025-02-23 | End: 2025-02-23

## 2025-02-23 RX ADMIN — IRON SUCROSE 300 MG: 20 INJECTION, SOLUTION INTRAVENOUS at 20:24

## 2025-02-23 RX ADMIN — PRENATAL VIT W/ FE FUMARATE-FA TAB 27-0.8 MG 1 TABLET: 27-0.8 TAB at 13:30

## 2025-02-23 RX ADMIN — DIPHENHYDRAMINE HYDROCHLORIDE 50 MG: 50 INJECTION INTRAMUSCULAR; INTRAVENOUS at 23:05

## 2025-02-23 ASSESSMENT — PAIN - FUNCTIONAL ASSESSMENT
PAIN_FUNCTIONAL_ASSESSMENT: 0-10

## 2025-02-23 ASSESSMENT — PAIN SCALES - GENERAL
PAINLEVEL_OUTOF10: 3
PAINLEVEL_OUTOF10: 0 - NO PAIN
PAINLEVEL_OUTOF10: 0 - NO PAIN
PAINLEVEL_OUTOF10: 4

## 2025-02-23 ASSESSMENT — PAIN DESCRIPTION - DESCRIPTORS
DESCRIPTORS: CRAMPING;DISCOMFORT
DESCRIPTORS: ACHING;DISCOMFORT

## 2025-02-23 NOTE — SIGNIFICANT EVENT
"  2000 Update    RN notified of patient endorsing back pain c/w contractions. To bedside. Patient reporting  ctx q5-10 mins, stable, not increasing in intensity but requesting cervical exam. Also endorsinga 5/10 HA that starts in back of the neck and feels pressure like on her head. Not like her migraines that she normally has. Improves with tylenol and caffeine however still present. Denies vision spots, CP, SOB, or RUQ pain. No VB or LOF. Good FM.     BP 94/54   Pulse 70   Temp 36.9 °C (98.4 °F) (Temporal)   Resp 16   Ht 1.473 m (4' 10\")   Wt 48.2 kg (106 lb 4.2 oz)   LMP 06/26/2024 (Exact Date)   SpO2 98%   BMI 22.21 kg/m²   Constitutional: No visible distress, alert and cooperative  Respiratory/Thorax: Normal respiratory effort on RA, CTAB  Cardiovascular: Reg rate and rhythm  Gastrointestinal: soft, nondistended, nontender, gravid  Neurological: A&Ox3  Psychological: Appropriate mood and behavior  SVE: 5/80/-3    Plan:    Headache  - improves with medications  - patient will do a hot shower  - 2g Mag bolus  - continue flexeril, tylenol, benadryl and reglan prn   - will fu response    PTL  - stable SVE at 5/80/-3  - no palpable contractions      Sadaf Martinez MD  PGY-1, Obstetrics and Gynecology         "

## 2025-02-23 NOTE — PROGRESS NOTES
"ANTEPARTUM PROGRESS NOTE   2025, 2:06 AM     SUBJECTIVE:  No acute events overnight. Patient has no complaints this morning. Denies VB, LOF. Reports normal fetal movement. Reports HA resolved overnight. Reports contractions are present but stable, still q10-15 mins and not changing in intensity.     OBJECTIVE:   /64   Pulse 64   Temp 36.9 °C (98.4 °F) (Temporal)   Resp 16   Ht 1.473 m (4' 10\")   Wt 48.2 kg (106 lb 4.2 oz)   LMP 2024 (Exact Date)   SpO2 98%   BMI 22.21 kg/m²    Temp  Min: 36 °C (96.8 °F)  Max: 37 °C (98.6 °F)  Pulse  Min: 63  Max: 84  BP  Min: 91/50  Max: 112/68    General:  AAOx3, No acute distress  Cardiovascular: Warm and well perfused  Respiratory: Normal respiratory effort   Abdominal:  Soft, gravid, non-tender, no rebound or guarding, no palpable contractions   Extremities: Warm, well perfused,   Pelvic:  deferred    NST: Baseline 150/ mod variability - accels/ prolonged subtle decel @611  [place]: ctx present, q 10 mins     ASSESSMENT AND PLAN:     29 y.o.  at 34w4d by LMP c/w 13 wk US who presented in  labor that stalled.     PTL  -Patient presented with painful contractions, found to make change 4/70/-2 on presentation with bulging bag to 5/80/-1, exam unchanged on L&D for over 14 hours therefore transferred to Antepartum unit, stable exam on  PM  - S/p BMZ - in anticipation for PTD   - Comfortable this morning, will recheck cervix as clinically indicated   - GBS unk, planning for PCN if laboring      Fetal status:   - Non-reassuring NST this AM -> to L&D for prolonged monitoring for 2 hours of reassuring FHT   - Continue daily NSTs while inpatient   - Ultrasound:  83%, AC 85% at 21 wk US, cephalic    - S/p BMZx2 on  -    - NICU to be consulted in event patient continues to labor    Routine:  - Flu/TdaP 2025  - 1hr wnl   - BCM: ppIUD (paragard)    Dispo: to L&D for prolonged monitoring    Pt seen and discussed with MFM " Attending, Dr. Saritha Mart MD, PGY-3  [facility]  Pager [phone]     Principal Problem:     labor in third trimester with  delivery, fetus 1 ([facility])  Active Problems:    PTSD (post-traumatic stress disorder)    Vision loss

## 2025-02-23 NOTE — CARE PLAN
The patient's goals for the shift include maintain pregnancy    The clinical goals for the shift include maintain pregnancy      Problem: Antepartum  Goal: Maintain pregnancy as long as maternal and/or fetal condition is stable  Outcome: Progressing  Goal: Avoid/minimize constipation  Outcome: Progressing  Goal: No decrease in circulation/VTE  Outcome: Progressing  Goal: FHR remains reassuring  Outcome: Progressing  Goal: Minimize anxiety/maximize coping  Outcome: Progressing     Problem:  Labor/Prolonged Premature Rupture of Membranes  Goal: Fewer then 4-6 ct per hour  Outcome: Progressing     Problem: Pain - Adult  Goal: Verbalizes/displays adequate comfort level or baseline comfort level  Outcome: Progressing     Problem: Safety - Adult  Goal: Free from fall injury  Outcome: Progressing     Problem: Discharge Planning  Goal: Discharge to home or other facility with appropriate resources  Outcome: Progressing

## 2025-02-24 ENCOUNTER — APPOINTMENT (OUTPATIENT)
Dept: RADIOLOGY | Facility: HOSPITAL | Age: 30
End: 2025-02-24
Payer: COMMERCIAL

## 2025-02-24 VITALS
BODY MASS INDEX: 22.31 KG/M2 | HEART RATE: 88 BPM | OXYGEN SATURATION: 98 % | DIASTOLIC BLOOD PRESSURE: 70 MMHG | SYSTOLIC BLOOD PRESSURE: 106 MMHG | WEIGHT: 106.26 LBS | RESPIRATION RATE: 18 BRPM | HEIGHT: 58 IN | TEMPERATURE: 98.1 F

## 2025-02-24 DIAGNOSIS — O99.013 ANEMIA AFFECTING PREGNANCY IN THIRD TRIMESTER (HHS-HCC): Primary | ICD-10-CM

## 2025-02-24 LAB
ABO GROUP (TYPE) IN BLOOD: NORMAL
ANTIBODY SCREEN: NORMAL
CLUE CELLS SPEC QL WET PREP: NORMAL
RH FACTOR (ANTIGEN D): NORMAL
T VAGINALIS SPEC QL WET PREP: NORMAL
WBC VAG QL WET PREP: NORMAL
YEAST VAG QL WET PREP: NORMAL

## 2025-02-24 PROCEDURE — 2500000001 HC RX 250 WO HCPCS SELF ADMINISTERED DRUGS (ALT 637 FOR MEDICARE OP): Mod: SE

## 2025-02-24 PROCEDURE — 87210 SMEAR WET MOUNT SALINE/INK: CPT

## 2025-02-24 PROCEDURE — 76816 OB US FOLLOW-UP PER FETUS: CPT

## 2025-02-24 PROCEDURE — 76819 FETAL BIOPHYS PROFIL W/O NST: CPT

## 2025-02-24 PROCEDURE — 59025 FETAL NON-STRESS TEST: CPT | Mod: GC

## 2025-02-24 PROCEDURE — 36415 COLL VENOUS BLD VENIPUNCTURE: CPT

## 2025-02-24 PROCEDURE — 86900 BLOOD TYPING SEROLOGIC ABO: CPT

## 2025-02-24 RX ORDER — FAMOTIDINE 10 MG/ML
20 INJECTION, SOLUTION INTRAVENOUS ONCE AS NEEDED
OUTPATIENT
Start: 2025-02-24

## 2025-02-24 RX ORDER — EPINEPHRINE 0.3 MG/.3ML
0.3 INJECTION SUBCUTANEOUS EVERY 5 MIN PRN
OUTPATIENT
Start: 2025-02-24

## 2025-02-24 RX ORDER — ALBUTEROL SULFATE 0.83 MG/ML
3 SOLUTION RESPIRATORY (INHALATION) AS NEEDED
OUTPATIENT
Start: 2025-02-24

## 2025-02-24 RX ORDER — DIPHENHYDRAMINE HYDROCHLORIDE 50 MG/ML
50 INJECTION INTRAMUSCULAR; INTRAVENOUS AS NEEDED
OUTPATIENT
Start: 2025-02-24

## 2025-02-24 RX ADMIN — PRENATAL VIT W/ FE FUMARATE-FA TAB 27-0.8 MG 1 TABLET: 27-0.8 TAB at 08:27

## 2025-02-24 RX ADMIN — ACETAMINOPHEN 975 MG: 325 TABLET ORAL at 11:33

## 2025-02-24 ASSESSMENT — PAIN SCALES - GENERAL
PAINLEVEL_OUTOF10: 3
PAINLEVEL_OUTOF10: 5 - MODERATE PAIN
PAINLEVEL_OUTOF10: 0 - NO PAIN

## 2025-02-24 ASSESSMENT — PAIN - FUNCTIONAL ASSESSMENT
PAIN_FUNCTIONAL_ASSESSMENT: 0-10
PAIN_FUNCTIONAL_ASSESSMENT: 0-10

## 2025-02-24 NOTE — CARE PLAN
Problem: Antepartum  Goal: Maintain pregnancy as long as maternal and/or fetal condition is stable  Outcome: Met  Goal: Avoid/minimize constipation  Outcome: Met  Goal: No decrease in circulation/VTE  Outcome: Met  Goal: FHR remains reassuring  Outcome: Met  Goal: Minimize anxiety/maximize coping  Outcome: Met     Problem:  Labor/Prolonged Premature Rupture of Membranes  Goal: Fewer then 4-6 ct per hour  Outcome: Met     Problem: Pain - Adult  Goal: Verbalizes/displays adequate comfort level or baseline comfort level  Outcome: Met     Problem: Discharge Planning  Goal: Discharge to home or other facility with appropriate resources  Outcome: Met   The patient's goals for the shift include maintain pregnancy    The clinical goals for the shift include Pt. will have no s/sx of PTL.    VSS and NST was reactive.Pt. also had an US today.She still has occas. Contractions but her VE has been unchanged.Tylenol helps with her discomfort.She has good FM and has had no VB.Stable and will be discharged.

## 2025-02-24 NOTE — DISCHARGE SUMMARY
Chronic, at goal (stable), continue current treatment plan  Refill meds, stable,no sedation/ diversion  Orders:    fentaNYL (DURAGESIC) 75 MCG/HR; Place 1 patch onto the skin every 72 hours for 30 days. Needs MYLAN    LORazepam (ATIVAN) 1 MG tablet; Take 1 tablet by mouth 3 times daily as needed for Anxiety for up to 90 days. TAKE 1 TABLET BY MOUTH three TIMES DAILY AS NEEDED FOR ANXIETY( MAXIMUM DAILY DOSE IS 4 TABLETS) Indications: anxious may fill 1-2 days early if necessary due to pharmacy hours Max Daily Amount: 3 mg    oxyCODONE HCl (OXY-IR) 10 MG immediate release tablet; Take 1 tablet by mouth 4 times daily as needed for Pain for up to 30 days. May fill one to two days early if necessary due to pharmacy hours Max Daily Amount: 40 mg     Discharge Diagnosis   labor in third trimester with  delivery, fetus 1 ([facility])    Issues Requiring Follow-Up  None    Test Results Pending At Discharge  Pending Labs       No current pending labs.            Hospital Course  Dorothy Drummond is a 29 y.o.  at 34w5d by LMP c/w 13 wk US who was admitted for  labor.     On , she presented with contractions and found to have advanced cervical dilation and received BMZ -. Cervical exam was stable by  at 5/80/-1, which she remained until day of discharge.     On , she reported increased discharge and LOF, for which PPROM was ruled out via sterile speculum exam. Wet prep was negative.   She will follow up with her primary OB provider and was given strict return precautions. IV iron infusions were scheduled on her behalf for iron deficiency anemia found on admission.      Pertinent Physical Exam At Time of Discharge  See progress note from .    Home Medications     Medication List      CONTINUE taking these medications     docusate sodium 100 mg capsule; Commonly known as: Colace; Take 1   capsule (100 mg) by mouth 2 times a day.   ferrous sulfate 325 (65 Fe) mg EC tablet; Take one tablet twice daily   every other day. Do not crush, chew, or split.   FLUoxetine 20 mg tablet; Commonly known as: PROzac   fluticasone 50 mcg/actuation nasal spray; Commonly known as: Flonase;   Administer 1 spray into each nostril once daily. Shake gently. Before   first use, prime pump. After use, clean tip and replace cap.     STOP taking these medications     cephalexin 500 mg capsule; Commonly known as: Keflex       Outpatient Follow-Up  Future Appointments   Date Time Provider Department Center   3/7/2025  4:20 PM LORENZO West [id number]   3/14/2025  4:20 PM LORENZO [id number] Gutierrez   3/21/2025  4:20 PM LORENZO [id number] Gutierrez     Discussed with Dr. Scott.    Zahira ZUNIGA  MD Nathen PGY-2

## 2025-02-24 NOTE — CARE PLAN
Problem: Antepartum  Goal: Maintain pregnancy as long as maternal and/or fetal condition is stable  Outcome: Progressing  Goal: Avoid/minimize constipation  Outcome: Progressing  Goal: No decrease in circulation/VTE  Outcome: Progressing  Goal: FHR remains reassuring  Outcome: Progressing  Goal: Minimize anxiety/maximize coping  Outcome: Progressing     Problem:  Labor/Prolonged Premature Rupture of Membranes  Goal: Fewer then 4-6 ct per hour  Outcome: Progressing     Problem: Pain - Adult  Goal: Verbalizes/displays adequate comfort level or baseline comfort level  Outcome: Progressing     Problem: Safety - Adult  Goal: Free from fall injury  Outcome: Progressing     Problem: Discharge Planning  Goal: Discharge to home or other facility with appropriate resources  Outcome: Progressing     The patient's goals for the shift include maintain pregnancy, get some rest     The clinical goals for the shift include VSS, assessments WNL, no s/sx of PTL, FHR remains reassuring     Over the shift, the patient did  make progress toward the following goals.

## 2025-02-24 NOTE — PROGRESS NOTES
"ANTEPARTUM PROGRESS NOTE   2025, 7:37 AM     SUBJECTIVE:  No acute events overnight. Patient has no complaints this morning. Denies VB, LOF. Reports normal fetal movement. Reports HA resolved overnight. Reports contractions are present but stable, still q10-15 mins and not changing in intensity.     OBJECTIVE:   /64 (BP Location: Left arm, Patient Position: Lying)   Pulse 60   Temp 36.7 °C (98.1 °F) (Temporal)   Resp 18   Ht 1.473 m (4' 10\")   Wt 48.2 kg (106 lb 4.2 oz)   LMP 2024 (Exact Date)   SpO2 98%   BMI 22.21 kg/m²    Temp  Min: 36.4 °C (97.5 °F)  Max: 37.4 °C (99.3 °F)  Pulse  Min: 55  Max: 79  BP  Min: 95/50  Max: 124/61    General:  AAOx3, No acute distress  Cardiovascular: Warm and well perfused  Respiratory: Normal respiratory effort   Abdominal:  Soft, gravid, non-tender, no rebound or guarding, no palpable contractions   Extremities: Warm, well perfused,   Pelvic:  deferred    NST: Baseline 135/ mod variability - accels/ prolonged subtle decel @611  Regina: ctx present, q7-8 mins     ASSESSMENT AND PLAN:   29 y.o.  at 34w5d by LMP c/w 13 wk US who presented in  labor that stalled.     PTL  -Patient presented with painful contractions, found to make change 70/-2 on presentation with bulging bag to 5/80/-1, exam unchanged on L&D for over 14 hours therefore transferred to Antepartum unit, stable exam on  PM. Last exam 80/-1, stable for over 48 hours  - S/p BMZ - in anticipation for PTD   - Comfortable this morning, will recheck cervix as clinically indicated. Ruled out for ROM overnight with SSE neg x3.  - GBS unk, planning for PCN if laboring      Fetal status:   - NST reactive this AM, not feeling contractions   - Continue daily NSTs while inpatient   - Ultrasound:  83%, AC 85% at 21 wk US, cephalic    - S/p BMZx2 on  -    - NICU to be consulted in event patient continues to labor    Routine:  - Flu/TdaP 2025  - 1hr wnl   - BCM: " ppIUD (paragard)    Dispo: inpatient monitoring of PTL    Pt seen and discussed with [facility] Attending, Dr. Scott.    Zahira Sena MD   PGY-2, [facility]  Pager [phone]     Principal Problem:     labor in third trimester with  delivery, fetus 1 ([facility])  Active Problems:    PTSD (post-traumatic stress disorder)    Vision loss

## 2025-02-24 NOTE — HOSPITAL COURSE
Dorothy Drummond is a 29 y.o.  at 34w5d by LMP c/w 13 wk US who was admitted for  labor.     On , she presented with contractions and found to have advanced cervical dilation and received BMZ -. Cervical exam was stable by  at 5/80/-1, which she remained until day of discharge.     On , she reported increased discharge and LOF, for which PPROM was ruled out via sterile speculum exam. Wet prep was negative.   She will follow up with her primary OB provider and was given strict return precautions. IV iron infusions were scheduled on her behalf for iron deficiency anemia found on admission.

## 2025-02-24 NOTE — SIGNIFICANT EVENT
"Clinical Progress Note    Subjective: To room for suspected SROM. Patient reports she had continued leaking after urination. Amenable to cervical check.    Objective:  Vitals: /60   Pulse 72   Temp 37.4 °C (99.3 °F) (Temporal)   Resp 18   Ht 1.473 m (4' 10\")   Wt 48.2 kg (106 lb 4.2 oz)   LMP 06/26/2024 (Exact Date)   SpO2 97%   BMI 22.21 kg/m²     SVE: 5/80/-1  FHT: 160/moderate/+accels/-deccels   Kirkpatrick: one contraction noted at start of tracing but no clear contractions following it    A/P:  Negative nitrazine, no pooling, negative ferning  Low suspicion for SROM at this time  NST reactive    Lexie Kruse MD, MPH  Obstetrics & Gynecology, PGY-1    "

## 2025-02-28 ENCOUNTER — TELEPHONE (OUTPATIENT)
Dept: OBSTETRICS AND GYNECOLOGY | Facility: HOSPITAL | Age: 30
End: 2025-02-28

## 2025-03-02 ENCOUNTER — HOSPITAL ENCOUNTER (INPATIENT)
Facility: HOSPITAL | Age: 30
End: 2025-03-02
Attending: STUDENT IN AN ORGANIZED HEALTH CARE EDUCATION/TRAINING PROGRAM | Admitting: MIDWIFE
Payer: COMMERCIAL

## 2025-03-02 VITALS
SYSTOLIC BLOOD PRESSURE: 117 MMHG | WEIGHT: 106.26 LBS | DIASTOLIC BLOOD PRESSURE: 75 MMHG | BODY MASS INDEX: 22.21 KG/M2 | OXYGEN SATURATION: 99 % | HEART RATE: 76 BPM | TEMPERATURE: 98.4 F | RESPIRATION RATE: 18 BRPM

## 2025-03-02 PROBLEM — Z37.9 NORMAL LABOR (HHS-HCC): Status: ACTIVE | Noted: 2025-03-02

## 2025-03-02 PROBLEM — Z3A.32 32 WEEKS GESTATION OF PREGNANCY (HHS-HCC): Status: RESOLVED | Noted: 2024-09-06 | Resolved: 2025-03-02

## 2025-03-02 LAB
ABO GROUP (TYPE) IN BLOOD: NORMAL
ANTIBODY SCREEN: NORMAL
ERYTHROCYTE [DISTWIDTH] IN BLOOD BY AUTOMATED COUNT: 18.4 % (ref 11.5–14.5)
HCT VFR BLD AUTO: 31.4 % (ref 36–46)
HGB BLD-MCNC: 9.7 G/DL (ref 12–16)
HOLD SPECIMEN: NORMAL
MCH RBC QN AUTO: 25.6 PG (ref 26–34)
MCHC RBC AUTO-ENTMCNC: 30.9 G/DL (ref 32–36)
MCV RBC AUTO: 83 FL (ref 80–100)
NRBC BLD-RTO: 0.7 /100 WBCS (ref 0–0)
PLATELET # BLD AUTO: 227 X10*3/UL (ref 150–450)
RBC # BLD AUTO: 3.79 X10*6/UL (ref 4–5.2)
RH FACTOR (ANTIGEN D): NORMAL
WBC # BLD AUTO: 22.5 X10*3/UL (ref 4.4–11.3)

## 2025-03-02 PROCEDURE — 36415 COLL VENOUS BLD VENIPUNCTURE: CPT | Performed by: MIDWIFE

## 2025-03-02 PROCEDURE — 86850 RBC ANTIBODY SCREEN: CPT | Performed by: MIDWIFE

## 2025-03-02 PROCEDURE — 86780 TREPONEMA PALLIDUM: CPT | Mod: STJLAB | Performed by: MIDWIFE

## 2025-03-02 PROCEDURE — 1220000001 HC OB SEMI-PRIVATE ROOM DAILY

## 2025-03-02 PROCEDURE — 7100000016 HC LABOR RECOVERY PER HOUR

## 2025-03-02 PROCEDURE — 2500000004 HC RX 250 GENERAL PHARMACY W/ HCPCS (ALT 636 FOR OP/ED)

## 2025-03-02 PROCEDURE — 85027 COMPLETE CBC AUTOMATED: CPT | Performed by: MIDWIFE

## 2025-03-02 PROCEDURE — 59409 OBSTETRICAL CARE: CPT | Performed by: MIDWIFE

## 2025-03-02 PROCEDURE — 2500000001 HC RX 250 WO HCPCS SELF ADMINISTERED DRUGS (ALT 637 FOR MEDICARE OP): Performed by: MIDWIFE

## 2025-03-02 PROCEDURE — 10907ZC DRAINAGE OF AMNIOTIC FLUID, THERAPEUTIC FROM PRODUCTS OF CONCEPTION, VIA NATURAL OR ARTIFICIAL OPENING: ICD-10-PCS | Performed by: MIDWIFE

## 2025-03-02 RX ORDER — TERBUTALINE SULFATE 1 MG/ML
0.25 INJECTION SUBCUTANEOUS ONCE AS NEEDED
Status: DISCONTINUED | OUTPATIENT
Start: 2025-03-02 | End: 2025-03-02

## 2025-03-02 RX ORDER — CARBOPROST TROMETHAMINE 250 UG/ML
250 INJECTION, SOLUTION INTRAMUSCULAR ONCE AS NEEDED
Status: DISCONTINUED | OUTPATIENT
Start: 2025-03-02 | End: 2025-03-04 | Stop reason: HOSPADM

## 2025-03-02 RX ORDER — CARBOPROST TROMETHAMINE 250 UG/ML
250 INJECTION, SOLUTION INTRAMUSCULAR ONCE AS NEEDED
Status: DISCONTINUED | OUTPATIENT
Start: 2025-03-02 | End: 2025-03-02

## 2025-03-02 RX ORDER — HYDRALAZINE HYDROCHLORIDE 20 MG/ML
5 INJECTION INTRAMUSCULAR; INTRAVENOUS ONCE AS NEEDED
Status: DISCONTINUED | OUTPATIENT
Start: 2025-03-02 | End: 2025-03-02

## 2025-03-02 RX ORDER — ONDANSETRON HYDROCHLORIDE 2 MG/ML
4 INJECTION, SOLUTION INTRAVENOUS EVERY 6 HOURS PRN
Status: DISCONTINUED | OUTPATIENT
Start: 2025-03-02 | End: 2025-03-04 | Stop reason: HOSPADM

## 2025-03-02 RX ORDER — ONDANSETRON HYDROCHLORIDE 2 MG/ML
4 INJECTION, SOLUTION INTRAVENOUS EVERY 6 HOURS PRN
Status: DISCONTINUED | OUTPATIENT
Start: 2025-03-02 | End: 2025-03-02

## 2025-03-02 RX ORDER — METHYLERGONOVINE MALEATE 0.2 MG/ML
0.2 INJECTION INTRAVENOUS ONCE AS NEEDED
Status: DISCONTINUED | OUTPATIENT
Start: 2025-03-02 | End: 2025-03-02

## 2025-03-02 RX ORDER — LOPERAMIDE HYDROCHLORIDE 2 MG/1
4 CAPSULE ORAL EVERY 2 HOUR PRN
Status: DISCONTINUED | OUTPATIENT
Start: 2025-03-02 | End: 2025-03-02

## 2025-03-02 RX ORDER — OXYTOCIN 10 [USP'U]/ML
10 INJECTION, SOLUTION INTRAMUSCULAR; INTRAVENOUS ONCE AS NEEDED
Status: DISCONTINUED | OUTPATIENT
Start: 2025-03-02 | End: 2025-03-02

## 2025-03-02 RX ORDER — ONDANSETRON 4 MG/1
4 TABLET, FILM COATED ORAL EVERY 6 HOURS PRN
Status: DISCONTINUED | OUTPATIENT
Start: 2025-03-02 | End: 2025-03-02

## 2025-03-02 RX ORDER — TRANEXAMIC ACID 100 MG/ML
1000 INJECTION, SOLUTION INTRAVENOUS ONCE AS NEEDED
Status: DISCONTINUED | OUTPATIENT
Start: 2025-03-02 | End: 2025-03-02

## 2025-03-02 RX ORDER — MISOPROSTOL 200 UG/1
800 TABLET ORAL ONCE AS NEEDED
Status: DISCONTINUED | OUTPATIENT
Start: 2025-03-02 | End: 2025-03-04 | Stop reason: HOSPADM

## 2025-03-02 RX ORDER — ONDANSETRON 4 MG/1
4 TABLET, FILM COATED ORAL EVERY 6 HOURS PRN
Status: DISCONTINUED | OUTPATIENT
Start: 2025-03-02 | End: 2025-03-04 | Stop reason: HOSPADM

## 2025-03-02 RX ORDER — IBUPROFEN 600 MG/1
600 TABLET ORAL EVERY 6 HOURS
Status: DISCONTINUED | OUTPATIENT
Start: 2025-03-02 | End: 2025-03-04 | Stop reason: HOSPADM

## 2025-03-02 RX ORDER — HYDRALAZINE HYDROCHLORIDE 20 MG/ML
5 INJECTION INTRAMUSCULAR; INTRAVENOUS ONCE AS NEEDED
Status: DISCONTINUED | OUTPATIENT
Start: 2025-03-02 | End: 2025-03-04 | Stop reason: HOSPADM

## 2025-03-02 RX ORDER — POLYETHYLENE GLYCOL 3350 17 G/17G
17 POWDER, FOR SOLUTION ORAL 2 TIMES DAILY PRN
Status: DISCONTINUED | OUTPATIENT
Start: 2025-03-02 | End: 2025-03-04 | Stop reason: HOSPADM

## 2025-03-02 RX ORDER — LOPERAMIDE HYDROCHLORIDE 2 MG/1
4 CAPSULE ORAL EVERY 2 HOUR PRN
Status: DISCONTINUED | OUTPATIENT
Start: 2025-03-02 | End: 2025-03-04 | Stop reason: HOSPADM

## 2025-03-02 RX ORDER — OXYTOCIN 10 [USP'U]/ML
10 INJECTION, SOLUTION INTRAMUSCULAR; INTRAVENOUS ONCE AS NEEDED
Status: DISCONTINUED | OUTPATIENT
Start: 2025-03-02 | End: 2025-03-04 | Stop reason: HOSPADM

## 2025-03-02 RX ORDER — LABETALOL HYDROCHLORIDE 5 MG/ML
20 INJECTION, SOLUTION INTRAVENOUS ONCE AS NEEDED
Status: DISCONTINUED | OUTPATIENT
Start: 2025-03-02 | End: 2025-03-04 | Stop reason: HOSPADM

## 2025-03-02 RX ORDER — SODIUM CHLORIDE, SODIUM LACTATE, POTASSIUM CHLORIDE, CALCIUM CHLORIDE 600; 310; 30; 20 MG/100ML; MG/100ML; MG/100ML; MG/100ML
75 INJECTION, SOLUTION INTRAVENOUS CONTINUOUS
Status: DISCONTINUED | OUTPATIENT
Start: 2025-03-02 | End: 2025-03-02

## 2025-03-02 RX ORDER — OXYTOCIN/0.9 % SODIUM CHLORIDE 30/500 ML
PLASTIC BAG, INJECTION (ML) INTRAVENOUS
Status: COMPLETED
Start: 2025-03-02 | End: 2025-03-02

## 2025-03-02 RX ORDER — MISOPROSTOL 200 UG/1
800 TABLET ORAL ONCE AS NEEDED
Status: DISCONTINUED | OUTPATIENT
Start: 2025-03-02 | End: 2025-03-02

## 2025-03-02 RX ORDER — LABETALOL HYDROCHLORIDE 5 MG/ML
20 INJECTION, SOLUTION INTRAVENOUS ONCE AS NEEDED
Status: DISCONTINUED | OUTPATIENT
Start: 2025-03-02 | End: 2025-03-02

## 2025-03-02 RX ORDER — METHYLERGONOVINE MALEATE 0.2 MG/ML
0.2 INJECTION INTRAVENOUS ONCE AS NEEDED
Status: DISCONTINUED | OUTPATIENT
Start: 2025-03-02 | End: 2025-03-04 | Stop reason: HOSPADM

## 2025-03-02 RX ORDER — LIDOCAINE HYDROCHLORIDE 10 MG/ML
30 INJECTION, SOLUTION INFILTRATION; PERINEURAL ONCE AS NEEDED
Status: DISCONTINUED | OUTPATIENT
Start: 2025-03-02 | End: 2025-03-02

## 2025-03-02 RX ORDER — NIFEDIPINE 10 MG/1
10 CAPSULE ORAL ONCE AS NEEDED
Status: DISCONTINUED | OUTPATIENT
Start: 2025-03-02 | End: 2025-03-02

## 2025-03-02 RX ORDER — OXYTOCIN/0.9 % SODIUM CHLORIDE 30/500 ML
60 PLASTIC BAG, INJECTION (ML) INTRAVENOUS ONCE AS NEEDED
Status: DISCONTINUED | OUTPATIENT
Start: 2025-03-02 | End: 2025-03-04 | Stop reason: HOSPADM

## 2025-03-02 RX ORDER — DIPHENHYDRAMINE HCL 25 MG
25 TABLET ORAL EVERY 6 HOURS PRN
Status: DISCONTINUED | OUTPATIENT
Start: 2025-03-02 | End: 2025-03-04 | Stop reason: HOSPADM

## 2025-03-02 RX ORDER — ACETAMINOPHEN 325 MG/1
975 TABLET ORAL EVERY 6 HOURS
Status: DISCONTINUED | OUTPATIENT
Start: 2025-03-02 | End: 2025-03-04 | Stop reason: HOSPADM

## 2025-03-02 RX ORDER — OXYTOCIN/0.9 % SODIUM CHLORIDE 30/500 ML
60 PLASTIC BAG, INJECTION (ML) INTRAVENOUS
Status: DISCONTINUED | OUTPATIENT
Start: 2025-03-02 | End: 2025-03-02

## 2025-03-02 RX ORDER — TRANEXAMIC ACID 100 MG/ML
1000 INJECTION, SOLUTION INTRAVENOUS ONCE AS NEEDED
Status: DISCONTINUED | OUTPATIENT
Start: 2025-03-02 | End: 2025-03-04 | Stop reason: HOSPADM

## 2025-03-02 RX ORDER — DIPHENHYDRAMINE HYDROCHLORIDE 50 MG/ML
25 INJECTION INTRAMUSCULAR; INTRAVENOUS EVERY 6 HOURS PRN
Status: DISCONTINUED | OUTPATIENT
Start: 2025-03-02 | End: 2025-03-04 | Stop reason: HOSPADM

## 2025-03-02 RX ORDER — ADHESIVE BANDAGE
10 BANDAGE TOPICAL
Status: DISCONTINUED | OUTPATIENT
Start: 2025-03-02 | End: 2025-03-04 | Stop reason: HOSPADM

## 2025-03-02 RX ORDER — SIMETHICONE 80 MG
80 TABLET,CHEWABLE ORAL 4 TIMES DAILY PRN
Status: DISCONTINUED | OUTPATIENT
Start: 2025-03-02 | End: 2025-03-04 | Stop reason: HOSPADM

## 2025-03-02 RX ADMIN — ACETAMINOPHEN 975 MG: 325 TABLET ORAL at 22:33

## 2025-03-02 RX ADMIN — Medication 60 MILLI-UNITS/MIN: at 19:54

## 2025-03-02 RX ADMIN — IBUPROFEN 600 MG: 600 TABLET, FILM COATED ORAL at 22:33

## 2025-03-02 SDOH — HEALTH STABILITY: MENTAL HEALTH: SUICIDAL BEHAVIOR (LIFETIME): YES

## 2025-03-02 SDOH — SOCIAL STABILITY: SOCIAL INSECURITY: WITHIN THE LAST YEAR, HAVE YOU BEEN AFRAID OF YOUR PARTNER OR EX-PARTNER?: NO

## 2025-03-02 SDOH — HEALTH STABILITY: MENTAL HEALTH: SUICIDAL BEHAVIOR (3 MONTHS): NO

## 2025-03-02 SDOH — ECONOMIC STABILITY: FOOD INSECURITY: WITHIN THE PAST 12 MONTHS, YOU WORRIED THAT YOUR FOOD WOULD RUN OUT BEFORE YOU GOT THE MONEY TO BUY MORE.: NEVER TRUE

## 2025-03-02 SDOH — SOCIAL STABILITY: SOCIAL INSECURITY: WITHIN THE LAST YEAR, HAVE YOU BEEN HUMILIATED OR EMOTIONALLY ABUSED IN OTHER WAYS BY YOUR PARTNER OR EX-PARTNER?: NO

## 2025-03-02 SDOH — ECONOMIC STABILITY: HOUSING INSECURITY: DO YOU FEEL UNSAFE GOING BACK TO THE PLACE WHERE YOU ARE LIVING?: NO

## 2025-03-02 SDOH — SOCIAL STABILITY: SOCIAL INSECURITY: VERBAL ABUSE: DENIES

## 2025-03-02 SDOH — HEALTH STABILITY: MENTAL HEALTH: NON-SPECIFIC ACTIVE SUICIDAL THOUGHTS (PAST 1 MONTH): NO

## 2025-03-02 SDOH — ECONOMIC STABILITY: FOOD INSECURITY: HOW HARD IS IT FOR YOU TO PAY FOR THE VERY BASICS LIKE FOOD, HOUSING, MEDICAL CARE, AND HEATING?: NOT HARD AT ALL

## 2025-03-02 SDOH — HEALTH STABILITY: MENTAL HEALTH: WERE YOU ABLE TO COMPLETE ALL THE BEHAVIORAL HEALTH SCREENINGS?: YES

## 2025-03-02 SDOH — SOCIAL STABILITY: SOCIAL INSECURITY: DOES ANYONE TRY TO KEEP YOU FROM HAVING/CONTACTING OTHER FRIENDS OR DOING THINGS OUTSIDE YOUR HOME?: NO

## 2025-03-02 SDOH — SOCIAL STABILITY: SOCIAL INSECURITY: PHYSICAL ABUSE: DENIES

## 2025-03-02 SDOH — SOCIAL STABILITY: SOCIAL INSECURITY: DO YOU FEEL ANYONE HAS EXPLOITED OR TAKEN ADVANTAGE OF YOU FINANCIALLY OR OF YOUR PERSONAL PROPERTY?: NO

## 2025-03-02 SDOH — HEALTH STABILITY: MENTAL HEALTH: WISH TO BE DEAD (PAST 1 MONTH): NO

## 2025-03-02 SDOH — SOCIAL STABILITY: SOCIAL INSECURITY: ARE THERE ANY APPARENT SIGNS OF INJURIES/BEHAVIORS THAT COULD BE RELATED TO ABUSE/NEGLECT?: NO

## 2025-03-02 SDOH — ECONOMIC STABILITY: FOOD INSECURITY: WITHIN THE PAST 12 MONTHS, THE FOOD YOU BOUGHT JUST DIDN'T LAST AND YOU DIDN'T HAVE MONEY TO GET MORE.: NEVER TRUE

## 2025-03-02 SDOH — SOCIAL STABILITY: SOCIAL INSECURITY: HAS ANYONE EVER THREATENED TO HURT YOUR FAMILY OR YOUR PETS?: NO

## 2025-03-02 SDOH — ECONOMIC STABILITY: TRANSPORTATION INSECURITY: IN THE PAST 12 MONTHS, HAS LACK OF TRANSPORTATION KEPT YOU FROM MEDICAL APPOINTMENTS OR FROM GETTING MEDICATIONS?: NO

## 2025-03-02 SDOH — SOCIAL STABILITY: SOCIAL INSECURITY: HAVE YOU HAD ANY THOUGHTS OF HARMING ANYONE ELSE?: NO

## 2025-03-02 SDOH — SOCIAL STABILITY: SOCIAL INSECURITY: HAVE YOU HAD THOUGHTS OF HARMING ANYONE ELSE?: NO

## 2025-03-02 SDOH — SOCIAL STABILITY: SOCIAL INSECURITY: ARE YOU OR HAVE YOU BEEN THREATENED OR ABUSED PHYSICALLY, EMOTIONALLY, OR SEXUALLY BY ANYONE?: NO

## 2025-03-02 SDOH — SOCIAL STABILITY: SOCIAL INSECURITY: ABUSE SCREEN: ADULT

## 2025-03-02 ASSESSMENT — PAIN DESCRIPTION - DESCRIPTORS
DESCRIPTORS: SORE

## 2025-03-02 ASSESSMENT — PAIN SCALES - GENERAL
PAINLEVEL_OUTOF10: 3
PAINLEVEL_OUTOF10: 4
PAINLEVEL_OUTOF10: 5 - MODERATE PAIN
PAINLEVEL_OUTOF10: 5 - MODERATE PAIN
PAINLEVEL_OUTOF10: 4
PAINLEVEL_OUTOF10: 5 - MODERATE PAIN
PAINLEVEL_OUTOF10: 4

## 2025-03-02 ASSESSMENT — ACTIVITIES OF DAILY LIVING (ADL): LACK_OF_TRANSPORTATION: NO

## 2025-03-02 ASSESSMENT — LIFESTYLE VARIABLES
HOW OFTEN DO YOU HAVE A DRINK CONTAINING ALCOHOL: NEVER
AUDIT-C TOTAL SCORE: 0
AUDIT-C TOTAL SCORE: 0
HOW MANY STANDARD DRINKS CONTAINING ALCOHOL DO YOU HAVE ON A TYPICAL DAY: PATIENT DOES NOT DRINK
SKIP TO QUESTIONS 9-10: 1
HOW OFTEN DO YOU HAVE 6 OR MORE DRINKS ON ONE OCCASION: NEVER

## 2025-03-02 ASSESSMENT — PATIENT HEALTH QUESTIONNAIRE - PHQ9
1. LITTLE INTEREST OR PLEASURE IN DOING THINGS: NOT AT ALL
2. FEELING DOWN, DEPRESSED OR HOPELESS: NOT AT ALL
SUM OF ALL RESPONSES TO PHQ9 QUESTIONS 1 & 2: 0

## 2025-03-03 LAB — TREPONEMA PALLIDUM IGG+IGM AB [PRESENCE] IN SERUM OR PLASMA BY IMMUNOASSAY: NONREACTIVE

## 2025-03-03 PROCEDURE — 1220000001 HC OB SEMI-PRIVATE ROOM DAILY

## 2025-03-03 PROCEDURE — 2500000001 HC RX 250 WO HCPCS SELF ADMINISTERED DRUGS (ALT 637 FOR MEDICARE OP): Performed by: MIDWIFE

## 2025-03-03 RX ORDER — FLUOXETINE 20 MG/5ML
20 SOLUTION ORAL DAILY
Status: DISCONTINUED | OUTPATIENT
Start: 2025-03-03 | End: 2025-03-03

## 2025-03-03 RX ORDER — FLUOXETINE 20 MG/5ML
20 SOLUTION ORAL NIGHTLY
Status: DISCONTINUED | OUTPATIENT
Start: 2025-03-03 | End: 2025-03-03

## 2025-03-03 RX ORDER — FLUOXETINE HYDROCHLORIDE 20 MG/1
20 CAPSULE ORAL DAILY
Status: DISCONTINUED | OUTPATIENT
Start: 2025-03-03 | End: 2025-03-04 | Stop reason: HOSPADM

## 2025-03-03 RX ADMIN — ACETAMINOPHEN 975 MG: 325 TABLET ORAL at 15:46

## 2025-03-03 RX ADMIN — ACETAMINOPHEN 975 MG: 325 TABLET ORAL at 04:36

## 2025-03-03 RX ADMIN — IBUPROFEN 600 MG: 600 TABLET, FILM COATED ORAL at 15:46

## 2025-03-03 RX ADMIN — ACETAMINOPHEN 975 MG: 325 TABLET ORAL at 21:19

## 2025-03-03 RX ADMIN — IBUPROFEN 600 MG: 600 TABLET, FILM COATED ORAL at 21:19

## 2025-03-03 RX ADMIN — ACETAMINOPHEN 975 MG: 325 TABLET ORAL at 09:59

## 2025-03-03 RX ADMIN — IBUPROFEN 600 MG: 600 TABLET, FILM COATED ORAL at 04:36

## 2025-03-03 RX ADMIN — IBUPROFEN 600 MG: 600 TABLET, FILM COATED ORAL at 09:59

## 2025-03-03 ASSESSMENT — PAIN SCALES - GENERAL
PAINLEVEL_OUTOF10: 0 - NO PAIN

## 2025-03-03 NOTE — L&D DELIVERY NOTE
OB Delivery Note  3/2/2025  Dorothy Drummond  29 y.o.   Vaginal, Spontaneous       Gestational Age: 35w4d  /Para:   Quantitative Blood Loss: Admission to Discharge: 0 mL (3/2/2025  7:10 PM - 3/2/2025  7:49 PM)    Nathan Drummond [[id number]]      Labor Events    Rupture type: Artificial  Fluid color: Clear  Fluid odor: None  Labor type: Spontaneous Onset of Labor  Complications: None       Placenta    Placenta delivery date/time:   Placenta removal: Spontaneous  Placenta appearance: Intact  Placenta disposition: lab       Cord    Vessels: 3 vessels  Complications: None  Delayed cord clamping?: Yes  Cord blood disposition: Lab  Gases sent?: No  Stem cell collection (by provider): No       Lacerations    Episiotomy: None  Perineal laceration: None  Other lacerations?: No  Repair suture: None       Anesthesia    Method: None       Operative Delivery    Forceps attempted?: No  Vacuum extractor attempted?: No       Shoulder Dystocia    Shoulder dystocia present?: No       [place] Delivery    Birth date/time: 3/2/2025 19:35:00  Delivery type: Vaginal, Spontaneous  Complications: None       Resuscitation    Method: Tactile stimulation, Suctioning       Apgars    Living status: Living  Apgar Component Scores:  1 min.:  5 min.:  10 min.:  15 min.:  20 min.:    Skin color:  1  1       Heart rate:  2  2       Reflex irritability:  2  2       Muscle tone:  2  2       Respiratory effort:  2  2       Total:  9  9       Apgars assigned by: NORMA YANG       Delivery Providers    Delivering clinician: LORENZO Parekh   Provider Role     Delivery Nurse     Nursery Nurse     Resident                     LORENZO Parekh

## 2025-03-03 NOTE — PROGRESS NOTES
Social Work Assessment       Patient: Dorothy Drummond   Address: [address] [place], [place] [address]  Phone: [phone]    Referral Reason: Support     Prenatal Care: Yes     Name: Ginny Drummond   [place] : 3/2/[birth year]    Other Children: Ms. Drummond has 2 other children - 10 year old son and 19 month old daughter (previous partner).   Ms. Drummond's significant other, Vinny, has 3 other children - 7 year old daughter and 6 year old son who reside with their mother and a 19 month old son who resides in home with them (in custody of Vinny through DCFS).     Household Composition: Ms. Drummond stated she and S/O reside in home together with her children and his youngest son.    IPV/DV or Safety Concerns: Ms. Drummond denied any concerns for safety.   She spoke about her  who passed away in 2023 due to chronic illness.  She also stated uncertain of who FOB is for  (2 possible males). She stated she would like for  to have biological father's last name (once determined), but that she does not desire child support and does not want to do paternity testing through [employer] (she will buy over the counter test). With this,  encouraged her to place her last name on child's birth certificate as this can be addended once paternity established (for fee through health department) and informed her not to sign affidavit as this would be in lieu of paternity test.      Car-Seat: Yes   Safe Sleep Space: Yes   Safe Sleep Education: Yes    Transportation Concerns: No    School/Work/Income: Ms. Drummond is connected with [employer] for medical and SNAP as well as connected with WIC.     Insurance: [facility] -  provided reminder to contact [facility] within 30 days to ensure child is added to assistance.     Mental Health Diagnoses: Anxiety, Depression, and PTSD with history of SI/SA. Ms. Drummond confirmed this. She stated last SA was after her   (in 2023). She stated he had  been chronically ill and  when her youngest (at the time) was just 3 months old. She stated also having experienced PPD.     Medication(s): Prozac after last delivery and then stopped as felt no longer needed, but restarting tonight as aware of risk for PPD.      Counseling: Yes, [place] [facility] [place] [facility] She stated she is considering restarting virtual appointments through [facility] and stated knowing how to reconnect.     -  provided information about PPD, paternal postpartum depression, and support resources available.     Supports: Yes    Substance Use History: No    Toxicology Screens: No     Department of Children and Family Services (DCFS): No       Assessment:  received verbal consult for support from OB staff.  was able to review chart and meet with Ms. Drummond to introduce self, obtain additional information, and provide support and assistance as may be needed at this time. Ms. Drummond receptive.   Ms. Drummond was open to talking with  and assessment completed. No concerns identified at this time and she was encouraged to reach out should anything arise. Support provided and self-care encouraged.       Plan:  will remain available if/as needed through remainder of admission.       Signature: JYOTSNA Lui

## 2025-03-03 NOTE — LACTATION NOTE
Lactation Consultant Note  Lactation Consultation  Reason for Consult: Initial assessment  Consultant Name: Jackie Espitia    Maternal Information  Has mother  before?: Yes  How long did the mother previously breastfeed?: Older children ages 10 and 2. BF 11y/o for 3 months, 3y/o for 3 weeks  Previous Maternal Breastfeeding Challenges: Difficult latch, Low milk supply, Lack of support  Infant to breast within first 2 hours of birth?: Yes  Exclusive Pump and Bottle Feed: No    Maternal Assessment  Breast Assessment: Small, Soft  Nipple Assessment: Intact, Erect  Areola Assessment: Normal    Infant Assessment  Infant Behavior: Sleepy, Rooting response, Suckles on and off, needs stimulation  Infant Assessment: Tongue protrudes over alveolar ridge, Good cupping of tongue    Feeding Assessment  Nutrition Source: Breastmilk, Formula (medically indicated)  Feeding Method: Nursing at the breast, Paced bottle  Feeding Position: Cradle, Skin to skin, Breast sandwich, Mother demonstrates good positioning  Suck/Feeding: Tactile stimulation needed, Baby led rhythmically, Audible swallowing  Latch Assessment: Minimal assistance is needed, Instructed on deep latch, Latch achieved after repeated attempts, Wide open mouth < 160, Bursts of sucking, swallowing, and rest    LATCH TOOL  Latch: Repeated attempts, hold nipple in mouth, stimulate to suck  Audible Swallowing: Spontaneous and intermittent (24 hours old)  Type of Nipple: Everted (After stimulation)  Comfort (Breast/Nipple): Soft/non-tender  Hold (Positioning): Minimal assist, teach one side, mother does other, staff holds  LATCH Score: 8    Breast Pump  Pump: Hospital grade electric pump  Frequency: 3-4 times per day  Duration: 15-20 minutes per session  Breast Shield Size and Type: 21 mm    Other OB Lactation Tools       Patient Follow-up  Inpatient Lactation Follow-up Needed : Yes  Outpatient Lactation Follow-up: Recommended    Other OB Lactation  Documentation  Maternal Risk Factors: Other (comment),  delivery <37 weeks (Hx PTSD, anxiety, depression)  Infant Risk Factors: Prematurity <37 weeks    Recommendations/Summary  , 35.4 weeks,  on 3/2@1934. Birthweight 2751g. TCB 2.3@8 hours. Pediatrician reports current plan is to latch infant and then supplement with 10-15mL Neosure. Low BG and OGG, BG stable now with supplementation. Last BG 68.  LC at bedside for feeding. Infant sleepy, held skin to skin. Demonstrated gentle waking to mother. Mother demonstrates hand expression, large drops expressed. Infant roused and rooting, sleepy at breast. Latches after a few attempts with wide gape and flanged lips, needs stimulation to suck and swallow. Sucking improves with chin support. Encouraged mother to use breast sandwich and chin support to keep infant interested.  Pumping initiated due to plate  infant and supplementation. Taught setup, use, and cleaning of pump. Reviewed need to pump for 15min after feedings as tolerated. Any pumped milk to be given by spoon, cup, or oral syringe during next feeding.    Plan:  -Feed on demand, at least 8 times in 24 hours  -Supplement after latching with 10-15mL Neosure or EBM  -Pump after feeds for 15min  -Limit active feeding time to 30min or less  -Call for assistance as needed    Educated parents on skin to skin, hand expression, hunger cues and feeding frequency/patterns. Discussed expected output, weight loss <10%, and normal bilirubin. Educated on AAP pacifier recommendations. Reviewed outpatient resources.

## 2025-03-03 NOTE — PROGRESS NOTES
Spiritual Care Visit  Spiritual Care Request    Reason for Visit:  Routine Visit: Introduction     Request Received From:       Focus of Care:  Visited With: Patient         Refer to :          Spiritual Care Assessment    Spiritual Assessment:                      Care Provided:  Intended Effects: Promote sense of peace, Preserve dignity and respect, Meaning-making  Methods: Offer support  Interventions: Share words of hope and inspiration, Provide a Sikh item(s)    Sense of Community and or Muslim Affiliation:  None         Addressed Needs/Concerns and/or Rashmi Through:          Outcome:        Advance Directives:         Spiritual Care Annotation    Annotation:  Patient shared this is her third baby.   was a supportive presence, congratulated her, and gave her a keepsake baby frame.

## 2025-03-03 NOTE — H&P
OB Admission H&P    Assessment/Plan    Dorothy Drummond is a 29 y.o.  at 35w4d, TOMMY: 2025, by Last Menstrual Period, who is admitted for Active Labor.    Previously admitted to [facility] for  labor. Received 2 doses betamethasone ( and ). Discharged after unchanged VE of 5/80/-1 on 25.    A: 30yo  at 35.4 per LMP      Active labor      GBS-       L/D      Antepartum Anemia --> H&H 8.7/27.6 on       Anxiety/depression      H/O asthma    Plan   -Admit to L&D, consented  -T&S, CBC, and Syphilis  -Epidural at patient request  -Recheck as clinically indicated by maternal or fetal status    Fetal Status  -NST reactive, reassuring   -Presentation vertex based on vaginal exam  -EFW 7lbs by Leopold's  -GBS Negative    Pregnancy Problems (from 24 to present)       Problem Noted Diagnosed Resolved    Anemia affecting pregnancy in third trimester ([facility]) 1/10/2025 by LORENZO West  No    Priority:  Medium       Overview Signed 1/10/2025  8:57 AM by LORENZO West     Hgb 9.3, ferritin 13. Iron rx'd. Repeat CBC and retic after two weeks of compliance         Depression affecting pregnancy ([facility]) 2024 by LORENZO West  No    Priority:  Medium       Overview Signed 2024  5:04 PM by LORENZO West     On proscac 20 mg daily and sees psych and [facility]         Mild intermittent asthma without complication ([facility]) 2/15/2024 by Eva Langley MD  No    Priority:  Medium       Overview Signed 2024  5:09 PM by LORENZO West     No history of hospitalizations or intubation.         35.4 weeks gestation of pregnancy ([facility]) 2024 by LORENZO West  3/2/2025 by LORENZO Parekh    Priority:  Medium       Overview Addendum 1/10/2025  4:47 PM by LORENZO West       Desired provider in labor: [x] CNM  [] Physician  [x] Blood Products: [x] Yes,  accepts [] No, needs counseling  [x] Initial BMI: 19.86   [x] Prenatal Labs: wnl  [x] Cervical Cancer Screening up to date: 10/2024  [x] Rh status: O+  [x] Genetic Screening:  risk reducing, having a girl  [x] NT US: (11-13 wks): wnl  [] Baby ASA (if indicated): n/a  [x] Pregnancy dated by: LMP    [x] Anatomy US: (19-20 wks)  [] Federal Sterilization consent signed (if indicated):  [x] 1hr GCT at 24-28wks: wnl  [] Rhogam (if indicated):   [] Fetal Surveillance (if indicated):  [x] Tdap (27-32 wks, may be given up to 36 wks if initial window missed): 1/10/25  [] RSV (32-36 wks) (Sept. to end of ):   [x] Flu Vaccine: 10/4/24    [x] Breastfeeding:  [x] Postpartum Birth control method: paragard  [] GBS at 36 - 37 wks:  [] 39 weeks discussion of IOL vs. Expectant management:  [] Mode of delivery ( anticipated ):                  Subjective   Good fetal movement.  Having contractions q 2-3 minutes.      Prenatal Provider [facility]    OB History    Para Term  AB Living   4 2 2 0 1 2   SAB IAB Ectopic Multiple Live Births   1 0 0 0 1      # Outcome Date GA Lbr Paramjit/2nd Weight Sex Type Anes PTL Lv   4 Current            3 Term 23 39w4d    Vag-Spont   CATIA   2 SAB            1 Term                Past Surgical History:   Procedure Laterality Date    CT ABDOMEN PELVIS ANGIOGRAM W AND/OR WO IV CONTRAST  2019    CT ABDOMEN PELVIS ANGIOGRAM W AND/OR WO IV CONTRAST 2019 [facility]LL[facility]    TONSILLECTOMY  2014    Tonsillectomy       Social History     Tobacco Use    Smoking status: Never    Smokeless tobacco: Never   Substance Use Topics    Alcohol use: Not Currently       Allergies   Allergen Reactions    Adhesive Tape-Silicones Rash       Medications Prior to Admission   Medication Sig Dispense Refill Last Dose/Taking    docusate sodium (Colace) 100 mg capsule Take 1 capsule (100 mg) by mouth 2 times a day. 180 capsule 1     ferrous sulfate 325 (65 Fe) MG EC tablet Take one tablet twice daily  every other day. Do not crush, chew, or split. 90 tablet 2     FLUoxetine (PROzac) 20 mg tablet Take 1 tablet (20 mg) by mouth once daily.       fluticasone (Flonase) 50 mcg/actuation nasal spray Administer 1 spray into each nostril once daily. Shake gently. Before first use, prime pump. After use, clean tip and replace cap. 16 g 0      Objective     Last Vitals  Temp Pulse Resp BP MAP O2 Sat     87       (!) 93 %     Blood Pressures    No data found in the last 1 encounters.          Physical Exam  General: NAD, mood appropriate  Cardiopulmonary: warm and well perfused, breathing comfortably on room air  Abdomen: Gravid, non-tender  Extremities: Symmetric  Speculum Exam: deferred  Cervix: 9 /100 /0     Chaperone Present: Yes.  Chaperone Name/Title: Fran Martinez Belsito  Examination Chaperoned:  HAYDEE     Fetal Monitoring  Baseline: 135 bpm, Variability: moderate,  Accelerations: present and Decelerations: none  Uterine Activity: q2-3 minutes  Interpretation: Reactive    Bedside ultrasound: No    Labs in chart were reviewed.          Prenatal labs reviewed, admission labs pending

## 2025-03-03 NOTE — CARE PLAN
The patient's goals for the shift include Bond with baby    The clinical goals for the shift include Manage Pain      Problem: Postpartum  Goal: Experiences normal postpartum course  Outcome: Progressing  Flowsheets (Taken 3/3/2025 0605)  Experiences normal postpartum course:   Monitor maternal vital signs   Med administration/monitoring of effect   Fundal and lochia asssessments w/fundal massage, bladder emptying   Assess uterine involution   Assist with identification of coping methods and supprot resources  Goal: Appropriate maternal -  bonding  Outcome: Progressing  Flowsheets (Taken 3/3/2025 0605)  Appropriate maternal- bonding:   Identify family support   24-hour rooming in     Problem: Safety - Adult  Goal: Free from fall injury  Outcome: Progressing  Flowsheets (Taken 3/3/2025 0605)  Free from fall injury: Instruct family/caregiver on patient safety     Problem: Discharge Planning  Goal: Discharge to home or other facility with appropriate resources  Outcome: Progressing  Flowsheets (Taken 3/3/2025 0605)  Discharge to home or other facility with appropriate resources: Identify barriers to discharge with patient and caregiver

## 2025-03-03 NOTE — CARE PLAN
The patient's goals for the shift include bond with baby    The clinical goals for the shift include normal postpartum    Over the shift, the patient did make progress toward the following goals.

## 2025-03-03 NOTE — PROGRESS NOTES
Postpartum Progress Note    Assessment/Plan   Dorothy Drummond is a 29 y.o., , who delivered at 35w4d gestation and is now postpartum day 1.    Afebrile and normotensive today  PP involution progressing  Anticipate discharge home tomorrow  All patient questions answered    Assessment & Plan  Normal labor ([facility])    Pregnancy Problems (from 24 to present)       Problem Noted Diagnosed Resolved    Anemia affecting pregnancy in third trimester ([facility]) 1/10/2025 by LORENZO West  No    Priority:  Medium       Overview Signed 1/10/2025  8:57 AM by LORENZO West     Hgb 9.3, ferritin 13. Iron rx'd. Repeat CBC and retic after two weeks of compliance         Depression affecting pregnancy ([facility]) 2024 by LORENZO West  No    Priority:  Medium       Overview Signed 2024  5:04 PM by LORENZO West     On proscac 20 mg daily and sees psych and therapist through [facility]         Mild intermittent asthma without complication ([facility]) 2/15/2024 by Eva Langley MD  No    Priority:  Medium       Overview Signed 2024  5:09 PM by LORENZO West     No history of hospitalizations or intubation.         32 weeks gestation of pregnancy ([facility]) 2024 by LORENZO West  3/2/2025 by LORENZO Parekh    Priority:  Medium       Overview Addendum 1/10/2025  4:47 PM by LORENZO West       Desired provider in labor: [x] CNM  [] Physician  [x] Blood Products: [x] Yes, accepts [] No, needs counseling  [x] Initial BMI: 19.86   [x] Prenatal Labs: wnl  [x] Cervical Cancer Screening up to date: 10/2024  [x] Rh status: O+  [x] Genetic Screening:  risk reducing, having a girl  [x] NT US: (11-13 wks): wnl  [] Baby ASA (if indicated): n/a  [x] Pregnancy dated by: LMP    [x] Anatomy US: (19-20 wks)  [] Federal Sterilization consent signed (if indicated):  [x] 1hr GCT at 24-28wks: wnl  [] Rhogam (if indicated):    [] Fetal Surveillance (if indicated):  [x] Tdap (27-32 wks, may be given up to 36 wks if initial window missed): 1/10/25  [] RSV (32-36 wks) (Sept. to end of Jan):   [x] Flu Vaccine: 10/4/24    [x] Breastfeeding:  [x] Postpartum Birth control method: paragard  [] GBS at 36 - 37 wks:  [] 39 weeks discussion of IOL vs. Expectant management:  [] Mode of delivery ( anticipated ):                Hospital course: no complications  Vaginal Birth  Patient is currently breastfeedingThe patient's blood type is O POS. The baby's blood type is O POS. Rhogam is not indicated.    Subjective   Her pain is well controlled with current medications  She is passing flatus  She is ambulating well  She is tolerating a Adult diet Regular  She reports no breast or nursing problems  She denies emotional concerns today   Her plan for contraception is undecided at this time     She will discuss contraception with Primary OB Provider    Objective   Allergies:   Adhesive tape-silicones         Last Vitals:  Temp Pulse Resp BP MAP Pulse Ox   36.5 °C (97.7 °F) 68 16 112/65 79 99 %     Vitals Min/Max Last 24 Hours:  Temp  Min: 36.5 °C (97.7 °F)  Max: 36.9 °C (98.4 °F)  Pulse  Min: 56  Max: 92  Resp  Min: 16  Max: 18  BP  Min: 108/64  Max: 139/71  MAP (mmHg)  Min: 79  Max: 101    Intake/Output:     Intake/Output Summary (Last 24 hours) at 3/3/2025 1027  Last data filed at 3/2/2025 2330  Gross per 24 hour   Intake --   Output 907 ml   Net -907 ml       Physical Exam:  General: Examination reveals a well developed, well nourished, female, in no acute distress. She is alert and cooperative.  Breasts: breasts appear normal for pregnancy, no suspicious masses, no skin or nipple changes or axillary nodes.  Fundus: firm, below umbilicus, and nontender.  Perineum: well healing.  Extremities: no redness or tenderness in the calves or thighs, no edema.  Psychological: awake and alert; oriented to person, place, and time.    Chaperone Present:  Declined.  Chaperone Name/Title:   Examination Chaperoned:  bedside post partum rounds    Lab Data:  Labs in chart were reviewed.

## 2025-03-04 VITALS
SYSTOLIC BLOOD PRESSURE: 107 MMHG | HEART RATE: 76 BPM | WEIGHT: 106.26 LBS | RESPIRATION RATE: 18 BRPM | OXYGEN SATURATION: 98 % | TEMPERATURE: 97.9 F | DIASTOLIC BLOOD PRESSURE: 63 MMHG | BODY MASS INDEX: 22.21 KG/M2

## 2025-03-04 PROBLEM — Z37.9 NORMAL LABOR (HHS-HCC): Status: RESOLVED | Noted: 2025-03-02 | Resolved: 2025-03-04

## 2025-03-04 PROCEDURE — 99238 HOSP IP/OBS DSCHRG MGMT 30/<: CPT | Performed by: MIDWIFE

## 2025-03-04 PROCEDURE — 2500000001 HC RX 250 WO HCPCS SELF ADMINISTERED DRUGS (ALT 637 FOR MEDICARE OP): Performed by: MIDWIFE

## 2025-03-04 PROCEDURE — 2500000001 HC RX 250 WO HCPCS SELF ADMINISTERED DRUGS (ALT 637 FOR MEDICARE OP): Performed by: ADVANCED PRACTICE MIDWIFE

## 2025-03-04 RX ADMIN — IBUPROFEN 600 MG: 600 TABLET, FILM COATED ORAL at 09:58

## 2025-03-04 RX ADMIN — ACETAMINOPHEN 975 MG: 325 TABLET ORAL at 09:58

## 2025-03-04 RX ADMIN — FLUOXETINE HYDROCHLORIDE 20 MG: 20 CAPSULE ORAL at 00:58

## 2025-03-04 RX ADMIN — IBUPROFEN 600 MG: 600 TABLET, FILM COATED ORAL at 03:16

## 2025-03-04 RX ADMIN — ACETAMINOPHEN 975 MG: 325 TABLET ORAL at 03:16

## 2025-03-04 ASSESSMENT — PAIN SCALES - GENERAL
PAINLEVEL_OUTOF10: 0 - NO PAIN

## 2025-03-04 NOTE — DISCHARGE SUMMARY
Discharge Summary    Admission Date: 3/2/2025  Discharge Date: 3/4/2025    Discharge Diagnosis  Normal labor ([facility])    Hospital Course  Delivery Date: 3/2/2025 7:35 PM  Delivery type: Vaginal, Spontaneous   GA at delivery: 35w4d  Outcome: Living  Anesthesia during delivery: None  Intrapartum complications: None  Feeding method: Breastfeeding Status: Yes     Procedures:    Contraception at discharge: none    Pertinent Physical Exam At Time of Discharge  None    Last Vitals:  Temp Pulse Resp BP MAP Pulse Ox   36.6 °C (97.9 °F) 76 18 107/63 78 98 %     Discharge Meds     Your medication list        CONTINUE taking these medications        Instructions Last Dose Given Next Dose Due   docusate sodium 100 mg capsule  Commonly known as: Colace      Take 1 capsule (100 mg) by mouth 2 times a day.       ferrous sulfate 325 (65 Fe) mg EC tablet      Take one tablet twice daily every other day. Do not crush, chew, or split.       FLUoxetine 20 mg tablet  Commonly known as: PROzac           fluticasone 50 mcg/actuation nasal spray  Commonly known as: Flonase      Administer 1 spray into each nostril once daily. Shake gently. Before first use, prime pump. After use, clean tip and replace cap.                 Complications Requiring Follow-Up  None    Test Results Pending At Discharge  Pending Labs       Order Current Status    Surgical Pathology Exam - PLACENTA In process            Outpatient Follow-Up  No future appointments.    I spent <30 minutes in the professional and overall care of this patient.      SAMIRA Parekh-EZRA  
No

## 2025-03-04 NOTE — PROGRESS NOTES
Postpartum Progress Note    Assessment/Plan   Dorothy Drummond is a 29 y.o., , who delivered at 35w4d gestation and is now postpartum day 2.    A:  PPD2      Normotensive      Intact perineum       Labor and delivery      Antepartum anemia --> h/h 9.7/31.4  Assessment & Plan  Normal labor ([facility])    Pregnancy Problems (from 24 to present)       Problem Noted Diagnosed Resolved    Anemia affecting pregnancy in third trimester ([facility]) 1/10/2025 by LORENZO West  No    Priority:  Medium       Overview Signed 1/10/2025  8:57 AM by LORENZO West     Hgb 9.3, ferritin 13. Iron rx'd. Repeat CBC and retic after two weeks of compliance         Depression affecting pregnancy ([facility]) 2024 by LORENZO West  No    Priority:  Medium       Overview Signed 2024  5:04 PM by LORENZO West     On proscac 20 mg daily and sees psych and therapist through [facility]         Mild intermittent asthma without complication ([facility]) 2/15/2024 by Eva Langley MD  No    Priority:  Medium       Overview Signed 2024  5:09 PM by LORENZO West     No history of hospitalizations or intubation.         32 weeks gestation of pregnancy ([facility]) 2024 by LORENZO West  3/2/2025 by LORENZO Parekh    Priority:  Medium       Overview Addendum 1/10/2025  4:47 PM by LORENZO West       Desired provider in labor: [x] CNM  [] Physician  [x] Blood Products: [x] Yes, accepts [] No, needs counseling  [x] Initial BMI: 19.86   [x] Prenatal Labs: wnl  [x] Cervical Cancer Screening up to date: 10/2024  [x] Rh status: O+  [x] Genetic Screening:  risk reducing, having a girl  [x] NT US: (11-13 wks): wnl  [] Baby ASA (if indicated): n/a  [x] Pregnancy dated by: LMP    [x] Anatomy US: (19-20 wks)  [] Federal Sterilization consent signed (if indicated):  [x] 1hr GCT at 24-28wks: wnl  [] Rhogam (if indicated):   []  Fetal Surveillance (if indicated):  [x] Tdap (27-32 wks, may be given up to 36 wks if initial window missed): 1/10/25  [] RSV (32-36 wks) (Sept. to end of Jan):   [x] Flu Vaccine: 10/4/24    [x] Breastfeeding:  [x] Postpartum Birth control method: paragard  [] GBS at 36 - 37 wks:  [] 39 weeks discussion of IOL vs. Expectant management:  [] Mode of delivery ( anticipated ):                Hospital course: no complications  Vaginal Birth  Patient is currently breastfeedingThe patient's blood type is O POS. The baby's blood type is O POS. Rhogam is not indicated.    Subjective   Her pain is well controlled with current medications  She is passing flatus  She is ambulating well  She is tolerating a Adult diet Regular  She reports no breast or nursing problems  She denies emotional concerns today   Her plan for contraception is none    Objective   Allergies:   Adhesive tape-silicones         Last Vitals:  Temp Pulse Resp BP MAP Pulse Ox   36.6 °C (97.9 °F) 76 18 107/63 78 98 %     Vitals Min/Max Last 24 Hours:  Temp  Min: 36.4 °C (97.5 °F)  Max: 36.8 °C (98.3 °F)  Pulse  Min: 67  Max: 82  Resp  Min: 16  Max: 20  BP  Min: 101/56  Max: 112/64  MAP (mmHg)  Min: 78  Max: 78    Intake/Output:   No intake or output data in the 24 hours ending 03/04/25 1021    Physical Exam:  General: Examination reveals a well developed, well nourished, female, in no acute distress. She is alert and cooperative.  Fundus: firm and below umbilicus.  Perineum: deferred and intact.  Extremities: no redness or tenderness in the calves or thighs, no edema.  Neurological: DTRs normal and symmetrical.  Psychological: awake and alert; oriented to person, place, and time.    Lab Data:  Labs in chart were reviewed.  Counseled anemia. Recommended/discussed continuing PNVs, adding an OTC iron supplement. Counseled to incorporate iron fortified foods, leafy green veggies.

## 2025-03-04 NOTE — NURSING NOTE
Written and verbal discharge instructions given, post birth warning signs reviewed, pt and fob verbalizes undersATNDING OF ALL INSTRUCTIONS AND importance of foolow up care for self and baby

## 2025-03-04 NOTE — CARE PLAN
The patient's goals for the shift include bond with baby    The clinical goals for the shift include wnl vitals    Over the shift, the patient did  make progress toward the following goals.

## 2025-03-07 ENCOUNTER — APPOINTMENT (OUTPATIENT)
Dept: OBSTETRICS AND GYNECOLOGY | Facility: CLINIC | Age: 30
End: 2025-03-07
Payer: COMMERCIAL

## 2025-03-14 ENCOUNTER — APPOINTMENT (OUTPATIENT)
Dept: OBSTETRICS AND GYNECOLOGY | Facility: CLINIC | Age: 30
End: 2025-03-14
Payer: COMMERCIAL

## 2025-03-14 LAB
CLINICAL SIG UPDATED INFO: NORMAL
LABORATORY COMMENT REPORT: NORMAL
PATH REPORT.FINAL DX SPEC: NORMAL
PATH REPORT.GROSS SPEC: NORMAL
PATH REPORT.RELEVANT HX SPEC: NORMAL
PATH REPORT.TOTAL CANCER: NORMAL

## 2025-03-21 ENCOUNTER — APPOINTMENT (OUTPATIENT)
Dept: OBSTETRICS AND GYNECOLOGY | Facility: CLINIC | Age: 30
End: 2025-03-21
Payer: COMMERCIAL

## 2025-04-08 ENCOUNTER — APPOINTMENT (OUTPATIENT)
Dept: OBSTETRICS AND GYNECOLOGY | Facility: CLINIC | Age: 30
End: 2025-04-08
Payer: COMMERCIAL

## 2025-04-08 VITALS
DIASTOLIC BLOOD PRESSURE: 76 MMHG | WEIGHT: 94.8 LBS | BODY MASS INDEX: 19.9 KG/M2 | HEIGHT: 58 IN | SYSTOLIC BLOOD PRESSURE: 113 MMHG

## 2025-04-08 DIAGNOSIS — Z30.09 GENERAL COUNSELING AND ADVICE FOR CONTRACEPTIVE MANAGEMENT: ICD-10-CM

## 2025-04-08 PROCEDURE — 96127 BRIEF EMOTIONAL/BEHAV ASSMT: CPT

## 2025-04-08 ASSESSMENT — EDINBURGH POSTNATAL DEPRESSION SCALE (EPDS)
I HAVE BEEN ANXIOUS OR WORRIED FOR NO GOOD REASON: YES, SOMETIMES
THE THOUGHT OF HARMING MYSELF HAS OCCURRED TO ME: HARDLY EVER
I HAVE FELT SAD OR MISERABLE: YES, QUITE OFTEN
I HAVE BEEN ABLE TO LAUGH AND SEE THE FUNNY SIDE OF THINGS: DEFINITELY NOT SO MUCH NOW
I HAVE BEEN SO UNHAPPY THAT I HAVE BEEN CRYING: YES, QUITE OFTEN
THINGS HAVE BEEN GETTING ON TOP OF ME: YES, SOMETIMES I HAVEN'T BEEN COPING AS WELL AS USUAL
I HAVE FELT SCARED OR PANICKY FOR NO GOOD REASON: YES, SOMETIMES
I HAVE BEEN SO UNHAPPY THAT I HAVE HAD DIFFICULTY SLEEPING: NOT VERY OFTEN
I HAVE BLAMED MYSELF UNNECESSARILY WHEN THINGS WENT WRONG: NOT VERY OFTEN
TOTAL SCORE: 16
I HAVE LOOKED FORWARD WITH ENJOYMENT TO THINGS: RATHER LESS THAN I USED TO

## 2025-04-08 NOTE — PROGRESS NOTES
Subjective   29 y.o.  presenting for postpartum follow-up   Delivery Date: 2025  Type of Delivery: Vaginal, Spontaneous   Intrapartum complications: PTL  Pregnancy Problems (from 24 to present)       Problem Noted Diagnosed Resolved    Anemia affecting pregnancy in third trimester 1/10/2025 by LORENZO West  No    Priority:  Medium       Overview Signed 1/10/2025  8:57 AM by LORENZO West     Hgb 9.3, ferritin 13. Iron rx'd. Repeat CBC and retic after two weeks of compliance         Depression affecting pregnancy ([facility]) 2024 by LORENZO West  No    Priority:  Medium       Overview Signed 2024  5:04 PM by LORENZO West     On proscac 20 mg daily and sees psych and therapist through [facility]         Mild intermittent asthma without complication ([facility]) 2/15/2024 by Eva Langley MD  No    Priority:  Medium       Overview Signed 2024  5:09 PM by LORENZO West     No history of hospitalizations or intubation.         32 weeks gestation of pregnancy ([facility]) 2024 by LORENZO West  3/2/2025 by LORENZO Parekh    Priority:  Medium       Overview Addendum 1/10/2025  4:47 PM by LORENZO West       Desired provider in labor: [x] CNM  [] Physician  [x] Blood Products: [x] Yes, accepts [] No, needs counseling  [x] Initial BMI: 19.86   [x] Prenatal Labs: wnl  [x] Cervical Cancer Screening up to date: 10/2024  [x] Rh status: O+  [x] Genetic Screening:  risk reducing, having a girl  [x] NT US: (11-13 wks): wnl  [] Baby ASA (if indicated): n/a  [x] Pregnancy dated by: LMP    [x] Anatomy US: (19-20 wks)  [] Federal Sterilization consent signed (if indicated):  [x] 1hr GCT at 24-28wks: wnl  [] Rhogam (if indicated):   [] Fetal Surveillance (if indicated):  [x] Tdap (27-32 wks, may be given up to 36 wks if initial window missed): 1/10/25  [] RSV (32-36 wks) (Sept. to end of ):   [x]  Flu Vaccine: 10/4/24    [x] Breastfeeding:  [x] Postpartum Birth control method: paragard  [] GBS at 36 - 37 wks:  [] 39 weeks discussion of IOL vs. Expectant management:  [] Mode of delivery ( anticipated ):                    PP Recovery: going well  Pain: controlled  Lacerations: none  Perineal discomfort: none  Lochia: pink-brown   Feeding Method: She is breast feeding with some supplementation. no breast or nursing problems  Lactation Consult Needed?: No  Birth Trauma: No  Bonding with Baby: well with baby girl, Serenity Sadaf  Sexual Intimacy: Yes  Contraceptive Method:  Plans an IUD  Depression - Denies s/s depression. Denies thoughts harming self or others  Postpartum Depression: High Risk (2025)    [place]  Depression Scale     Last EPDS Total Score: 16     Last EPDS Self Harm Result: Hardly ever     Bowel Symptoms - Negative for abdominal discomfort, blood in stool or black stool, and negative change in bowel habits.  Bladder Symptoms - No dysuria, denies hematuria, urinary frequency, urinary urgency or incontinence.   Menses Since Delivery- has not resumed     Physical Exam  Cardiovascular:      Rate and Rhythm: Normal rate.      Heart sounds: Normal heart sounds.   Chest:   Breasts:     Right: Normal.      Left: Normal.   Genitourinary:     General: Normal vulva.      Labia:         Right: No tenderness.       Uterus: Normal.       Adnexa: Right adnexa normal and left adnexa normal.   Musculoskeletal:      Cervical back: Normal range of motion.   Lymphadenopathy:      Upper Body:      Right upper body: No supraclavicular adenopathy.      Left upper body: No supraclavicular adenopathy.   Skin:     General: Skin is warm.   Neurological:      General: No focal deficit present.      Mental Status: She is alert.   Psychiatric:         Mood and Affect: Mood normal.          Plan      A/P. 29 y.o. now , s/p , normal recovery course  Last pap: 10/04/2024 ASCUS hpv-  Postpartum  contraception discussed - reviewing IUD options.  Reviewed efficacy, risks including risk for infection and uterine perforation, benefits, and alternatives with patient. Procedure reviewed in detail. Reviewed with patient that cramping and bleeding are common symptoms for a few days after procedure. Discussed unscheduled bleeding  that can occur for 3-6 months post procedure.  Patient instructed to premedicate prior to procedure with Tylenol and Motrin.   Returning to exercise and sex discussed. Patient is cleared.   Depression: EPDS  16 on today's visit. Pt declines titration of Prosac dose.  Patient offered referral to intensive  outpatient services. Patient is agreeable and accepts referral.   Routine follow up with PCP for health maintenance examination encouraged including TSH, cholesterol and vitamin D evaluation.  Encouraged continued breastfeeding. Patient aware resources are available should she need them.     Warning s/s discussed  All questions answered    Discussion: R/B of hormonal and non-hormonal IUD. Patient to schedule insertion    F/U  for routine annual exam w/PAP.      Sol Wagner RN  I saw and evaluated the patient. I personally obtained the key and critical portions of the history and physical exam or was physically present for key and critical portions performed by the student. I reviewed the student's documentation and discussed the patient with the student. I agree with the student's medical decision making as documented in the note.     SAMIRA West-EZRA

## 2025-04-15 ENCOUNTER — APPOINTMENT (OUTPATIENT)
Dept: BEHAVIORAL HEALTH | Facility: CLINIC | Age: 30
End: 2025-04-15
Payer: COMMERCIAL

## 2025-04-15 ENCOUNTER — APPOINTMENT (OUTPATIENT)
Dept: OBSTETRICS AND GYNECOLOGY | Facility: CLINIC | Age: 30
End: 2025-04-15
Payer: COMMERCIAL

## 2025-04-15 VITALS — DIASTOLIC BLOOD PRESSURE: 67 MMHG | WEIGHT: 97.4 LBS | SYSTOLIC BLOOD PRESSURE: 100 MMHG | BODY MASS INDEX: 20.36 KG/M2

## 2025-04-15 DIAGNOSIS — Z30.430 ENCOUNTER FOR INSERTION OF MIRENA IUD: Primary | ICD-10-CM

## 2025-04-15 LAB — PREGNANCY TEST URINE, POC: NEGATIVE

## 2025-04-15 PROCEDURE — 81025 URINE PREGNANCY TEST: CPT

## 2025-04-15 PROCEDURE — 58300 INSERT INTRAUTERINE DEVICE: CPT

## 2025-04-15 NOTE — PROGRESS NOTES
Patient ID: Dorothy Drummond is a 29 y.o. female.    IUD Insertion    Performed by: LORENZO West  Authorized by: LORENZO West    Procedure: IUD insertion    Pregnancy risk comment:  Patient had intercourse  3 days ago and used plan B. Patient comfortable using Mirena IUD for  emergency contraception.  Immediately prior to procedure a time out was called: yes    Pelvic exam performed: yes    Speculum placed in vagina: yes    Cervix cleaned and prepped: yes    Tenaculum/Allis/Ring Forceps applied to cervix: yes    Anesthesia used: no    Uterus sound depth (cm):  7.5  Cervix manually dilated: no    IUD inserted without complications: yes    OSM: levonorgestrel (Mirena) IUD 20 mcg/day  Strings trimmed to (cm):  3  Patient tolerated procedure well: yes    Inserted with ultrasound guidance: no    Intended removal date: 8 years      Patient here for IUD insertion. UPT negative and reasonably certain patient is not pregnant. Discussed procedure in detail and consent obtained. Reviewed with patient that cramping and bleeding are common symptoms for a few days after procedure. Patient informed that she may take Motrin 600 mg very 6 hours.  Discussed unscheduled bleeding and cramping that can occur for 3-6 months post procedure. Patient instructed to call office if she experiences severe abdominal pain or cramping, fever or chills, foul smelling odor or discharge, painful sexual intercourse, or a positive pregnancy test. Patient instructed to schedule a 4-6 week follow-up for a string check.

## 2025-04-16 LAB
C TRACH RRNA SPEC QL NAA+PROBE: NOT DETECTED
N GONORRHOEA RRNA SPEC QL NAA+PROBE: NOT DETECTED
QUEST GC CT AMPLIFIED (ALWAYS MESSAGE): NORMAL
T VAGINALIS RRNA SPEC QL NAA+PROBE: NOT DETECTED

## 2025-05-02 ENCOUNTER — HOSPITAL ENCOUNTER (EMERGENCY)
Facility: HOSPITAL | Age: 30
Discharge: HOME | End: 2025-05-03
Payer: COMMERCIAL

## 2025-05-02 VITALS
TEMPERATURE: 97.5 F | BODY MASS INDEX: 18.89 KG/M2 | HEART RATE: 72 BPM | OXYGEN SATURATION: 96 % | SYSTOLIC BLOOD PRESSURE: 112 MMHG | DIASTOLIC BLOOD PRESSURE: 59 MMHG | RESPIRATION RATE: 18 BRPM | HEIGHT: 58 IN | WEIGHT: 90 LBS

## 2025-05-02 DIAGNOSIS — Z71.1 WORRIED WELL: Primary | ICD-10-CM

## 2025-05-02 LAB
ALBUMIN SERPL BCP-MCNC: 4.6 G/DL (ref 3.4–5)
ALP SERPL-CCNC: 68 U/L (ref 33–110)
ALT SERPL W P-5'-P-CCNC: 31 U/L (ref 7–45)
ANION GAP SERPL CALC-SCNC: 13 MMOL/L (ref 10–20)
APTT PPP: 30 SECONDS (ref 26–36)
AST SERPL W P-5'-P-CCNC: 28 U/L (ref 9–39)
B-HCG SERPL-ACNC: <2 MIU/ML
BASOPHILS # BLD AUTO: 0.03 X10*3/UL (ref 0–0.1)
BASOPHILS NFR BLD AUTO: 0.4 %
BILIRUB SERPL-MCNC: 0.4 MG/DL (ref 0–1.2)
BUN SERPL-MCNC: 19 MG/DL (ref 6–23)
CALCIUM SERPL-MCNC: 9.6 MG/DL (ref 8.6–10.3)
CHLORIDE SERPL-SCNC: 104 MMOL/L (ref 98–107)
CO2 SERPL-SCNC: 26 MMOL/L (ref 21–32)
CREAT SERPL-MCNC: 0.68 MG/DL (ref 0.5–1.05)
EGFRCR SERPLBLD CKD-EPI 2021: >90 ML/MIN/1.73M*2
EOSINOPHIL # BLD AUTO: 0.17 X10*3/UL (ref 0–0.7)
EOSINOPHIL NFR BLD AUTO: 2.5 %
ERYTHROCYTE [DISTWIDTH] IN BLOOD BY AUTOMATED COUNT: 19.3 % (ref 11.5–14.5)
GLUCOSE SERPL-MCNC: 107 MG/DL (ref 74–99)
HCT VFR BLD AUTO: 37 % (ref 36–46)
HGB BLD-MCNC: 12.2 G/DL (ref 12–16)
IMM GRANULOCYTES # BLD AUTO: 0.01 X10*3/UL (ref 0–0.7)
IMM GRANULOCYTES NFR BLD AUTO: 0.1 % (ref 0–0.9)
INR PPP: 1.1 (ref 0.9–1.1)
LYMPHOCYTES # BLD AUTO: 2.78 X10*3/UL (ref 1.2–4.8)
LYMPHOCYTES NFR BLD AUTO: 41.2 %
MCH RBC QN AUTO: 27.4 PG (ref 26–34)
MCHC RBC AUTO-ENTMCNC: 33 G/DL (ref 32–36)
MCV RBC AUTO: 83 FL (ref 80–100)
MONOCYTES # BLD AUTO: 0.6 X10*3/UL (ref 0.1–1)
MONOCYTES NFR BLD AUTO: 8.9 %
NEUTROPHILS # BLD AUTO: 3.16 X10*3/UL (ref 1.2–7.7)
NEUTROPHILS NFR BLD AUTO: 46.9 %
NRBC BLD-RTO: 0 /100 WBCS (ref 0–0)
PLATELET # BLD AUTO: 296 X10*3/UL (ref 150–450)
POTASSIUM SERPL-SCNC: 3.5 MMOL/L (ref 3.5–5.3)
PROT SERPL-MCNC: 7.5 G/DL (ref 6.4–8.2)
PROTHROMBIN TIME: 11.6 SECONDS (ref 9.8–12.4)
RBC # BLD AUTO: 4.45 X10*6/UL (ref 4–5.2)
SODIUM SERPL-SCNC: 139 MMOL/L (ref 136–145)
WBC # BLD AUTO: 6.8 X10*3/UL (ref 4.4–11.3)

## 2025-05-02 PROCEDURE — 2500000004 HC RX 250 GENERAL PHARMACY W/ HCPCS (ALT 636 FOR OP/ED): Mod: JZ | Performed by: PHYSICIAN ASSISTANT

## 2025-05-02 PROCEDURE — 36415 COLL VENOUS BLD VENIPUNCTURE: CPT | Performed by: PHYSICIAN ASSISTANT

## 2025-05-02 PROCEDURE — 96374 THER/PROPH/DIAG INJ IV PUSH: CPT

## 2025-05-02 PROCEDURE — 96375 TX/PRO/DX INJ NEW DRUG ADDON: CPT

## 2025-05-02 PROCEDURE — 80053 COMPREHEN METABOLIC PANEL: CPT | Performed by: PHYSICIAN ASSISTANT

## 2025-05-02 PROCEDURE — 96361 HYDRATE IV INFUSION ADD-ON: CPT

## 2025-05-02 PROCEDURE — 84702 CHORIONIC GONADOTROPIN TEST: CPT | Performed by: PHYSICIAN ASSISTANT

## 2025-05-02 PROCEDURE — 85610 PROTHROMBIN TIME: CPT | Performed by: PHYSICIAN ASSISTANT

## 2025-05-02 PROCEDURE — 85025 COMPLETE CBC W/AUTO DIFF WBC: CPT | Performed by: PHYSICIAN ASSISTANT

## 2025-05-02 PROCEDURE — 99284 EMERGENCY DEPT VISIT MOD MDM: CPT

## 2025-05-02 PROCEDURE — 85730 THROMBOPLASTIN TIME PARTIAL: CPT | Performed by: PHYSICIAN ASSISTANT

## 2025-05-02 RX ORDER — ONDANSETRON HYDROCHLORIDE 2 MG/ML
4 INJECTION, SOLUTION INTRAVENOUS ONCE
Status: COMPLETED | OUTPATIENT
Start: 2025-05-02 | End: 2025-05-02

## 2025-05-02 RX ORDER — KETOROLAC TROMETHAMINE 30 MG/ML
15 INJECTION, SOLUTION INTRAMUSCULAR; INTRAVENOUS ONCE
Status: COMPLETED | OUTPATIENT
Start: 2025-05-02 | End: 2025-05-02

## 2025-05-02 RX ADMIN — SODIUM CHLORIDE 500 ML: 0.9 INJECTION, SOLUTION INTRAVENOUS at 23:28

## 2025-05-02 RX ADMIN — ONDANSETRON 4 MG: 2 INJECTION INTRAMUSCULAR; INTRAVENOUS at 23:28

## 2025-05-02 RX ADMIN — KETOROLAC TROMETHAMINE 15 MG: 30 INJECTION, SOLUTION INTRAMUSCULAR at 23:27

## 2025-05-02 ASSESSMENT — COLUMBIA-SUICIDE SEVERITY RATING SCALE - C-SSRS
2. HAVE YOU ACTUALLY HAD ANY THOUGHTS OF KILLING YOURSELF?: NO
6. HAVE YOU EVER DONE ANYTHING, STARTED TO DO ANYTHING, OR PREPARED TO DO ANYTHING TO END YOUR LIFE?: NO
6. HAVE YOU EVER DONE ANYTHING, STARTED TO DO ANYTHING, OR PREPARED TO DO ANYTHING TO END YOUR LIFE?: YES
1. IN THE PAST MONTH, HAVE YOU WISHED YOU WERE DEAD OR WISHED YOU COULD GO TO SLEEP AND NOT WAKE UP?: NO

## 2025-05-02 ASSESSMENT — LIFESTYLE VARIABLES
HAVE YOU EVER FELT YOU SHOULD CUT DOWN ON YOUR DRINKING: NO
EVER HAD A DRINK FIRST THING IN THE MORNING TO STEADY YOUR NERVES TO GET RID OF A HANGOVER: NO
HAVE PEOPLE ANNOYED YOU BY CRITICIZING YOUR DRINKING: NO
TOTAL SCORE: 0
EVER FELT BAD OR GUILTY ABOUT YOUR DRINKING: NO

## 2025-05-02 ASSESSMENT — PAIN SCALES - GENERAL: PAINLEVEL_OUTOF10: 6

## 2025-05-02 ASSESSMENT — PAIN DESCRIPTION - LOCATION: LOCATION: GENERALIZED

## 2025-05-02 ASSESSMENT — PAIN DESCRIPTION - PAIN TYPE: TYPE: ACUTE PAIN

## 2025-05-02 ASSESSMENT — PAIN - FUNCTIONAL ASSESSMENT: PAIN_FUNCTIONAL_ASSESSMENT: 0-10

## 2025-05-03 NOTE — ED PROVIDER NOTES
HPI   Chief Complaint   Patient presents with    Dehydration     Feels like she is dehydrated, states she has been bleeding for 14 days from IUD and felt dizzy today and fell down her stairs. Denies LOC and thinners.        29-year-old female presents to the emergency department for complaints of feeling dehydrated.  She states she had an IUD put in 2 weeks ago and has had bleeding every day since then.  She states she is bleeding through medium pads.  She states she is 2 months postpartum and breast-feeding and feels that the breast-feeding is dehydrating her and depleting her resources.  She states she think she could benefit from some fluids.  She denies nausea, vomiting, diarrhea, dysuria, hematuria, abdominal pain.              Patient History   Medical History[1]  Surgical History[2]  Family History[3]  Social History[4]    Physical Exam   ED Triage Vitals [05/02/25 2136]   Temperature Heart Rate Respirations BP   36.4 °C (97.5 °F) 72 18 112/59      Pulse Ox Temp Source Heart Rate Source Patient Position   96 % Temporal -- --      BP Location FiO2 (%)     -- --       Physical Exam  Vitals and nursing note reviewed.   Constitutional:       General: She is not in acute distress.  HENT:      Head: Atraumatic.      Mouth/Throat:      Mouth: Mucous membranes are moist.      Pharynx: Oropharynx is clear.   Eyes:      Extraocular Movements: Extraocular movements intact.      Conjunctiva/sclera: Conjunctivae normal.      Pupils: Pupils are equal, round, and reactive to light.   Cardiovascular:      Rate and Rhythm: Normal rate and regular rhythm.      Pulses: Normal pulses.   Pulmonary:      Effort: Pulmonary effort is normal. No respiratory distress.      Breath sounds: Normal breath sounds.   Abdominal:      General: There is no distension.      Palpations: Abdomen is soft.      Tenderness: There is no abdominal tenderness. There is no guarding or rebound.   Musculoskeletal:         General: No deformity.       Cervical back: Neck supple.   Skin:     General: Skin is warm and dry.   Neurological:      Mental Status: She is alert and oriented to person, place, and time. Mental status is at baseline.      Cranial Nerves: No cranial nerve deficit.      Sensory: No sensory deficit.      Motor: No weakness.   Psychiatric:         Mood and Affect: Mood normal.         Behavior: Behavior normal.           ED Course & MDM   Diagnoses as of 25 0001   Worried well                 No data recorded     Len[place] Coma Scale Score: 15 (256 : Dali Mckinley RN)                           Medical Decision Making  29-year-old female presents to the emergency department for dehydration and vaginal bleeding.  Vital signs are normal.  Heart and lungs are clear.  Abdomen is soft and nontender.  Labs do not show a leukocytosis, left shift, anemia, metabolic disturbance, renal insufficiency, lecture light abnormality.  Patient was treated with IV fluids, Toradol, Zofran.  Recommended close follow up with PCP and OBGYN.  Discussed results with patient and/or family/friend and recommended close follow up with primary care or specialist.  Reviewed return precautions at length.  I answered all questions.          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    Complete spontaneous     Depression     Encounter for examination of ears and hearing without abnormal findings 2016    Encounter for hearing evaluation    Female infertility     Otitis media, unspecified, right ear 10/20/2020    Otitis media, right    Pain in unspecified ankle and joints of unspecified foot 2021    Ankle pain    Personal history of other (healed) physical injury and trauma 2021    History of sprain of ankle    Personal history of other diseases of the digestive system 2017    History of oral lesions    Personal  history of other diseases of the digestive system 02/08/2020    History of dental abscess    Personal history of other diseases of the respiratory system 03/16/2020    History of pharyngitis    Possible exposure to STD 02/15/2024   [2]   Past Surgical History:  Procedure Laterality Date    CT ABDOMEN PELVIS ANGIOGRAM W AND/OR WO IV CONTRAST  5/1/2019    CT ABDOMEN PELVIS ANGIOGRAM W AND/OR WO IV CONTRAST 5/1/2019 [facility]    TONSILLECTOMY  05/23/2014    Tonsillectomy   [3] No family history on file.  [4]   Social History  Tobacco Use    Smoking status: Never    Smokeless tobacco: Never   Vaping Use    Vaping status: Never Used   Substance Use Topics    Alcohol use: Not Currently    Drug use: Never        Nette Bagley PA-C  05/03/25 0005     Female

## 2025-05-13 ENCOUNTER — APPOINTMENT (OUTPATIENT)
Dept: OBSTETRICS AND GYNECOLOGY | Facility: CLINIC | Age: 30
End: 2025-05-13
Payer: COMMERCIAL

## 2025-05-13 VITALS
SYSTOLIC BLOOD PRESSURE: 112 MMHG | BODY MASS INDEX: 20.07 KG/M2 | WEIGHT: 95.6 LBS | DIASTOLIC BLOOD PRESSURE: 74 MMHG | HEIGHT: 58 IN

## 2025-05-13 DIAGNOSIS — Z30.431 CONTRACEPTIVE, SURVEILLANCE, INTRAUTERINE DEVICE: ICD-10-CM

## 2025-05-13 DIAGNOSIS — Z53.21 PATIENT LEFT WITHOUT BEING SEEN: Primary | ICD-10-CM

## 2025-05-13 PROCEDURE — 3008F BODY MASS INDEX DOCD: CPT

## 2025-05-13 PROCEDURE — 1036F TOBACCO NON-USER: CPT

## 2025-05-13 NOTE — PROGRESS NOTES
Subjective   Patient ID: Dorothy Drummond is a 29 y.o. female who presents for IUD surveillance   HPI Patient reports satisfaction with IUD. Denies dyspareunia and partner cannot feel the IUD strings. Some spotting noted occasionally but no pain or heavy bleeding.     Review of Systems    Objective   Physical Exam  Pulmonary:      Effort: Pulmonary effort is normal.   Genitourinary:     Comments: IUD strings visualized  Skin:     General: Skin is warm.   Neurological:      Mental Status: She is alert.   Psychiatric:         Mood and Affect: Mood normal.         Assessment/Plan   Diagnoses and all orders for this visit:  Contraceptive, surveillance, intrauterine device  Continue with current method. Report any adverse side effects.    Follow-up in 1 year or sooner if needed.     YADI Richter     Patient seen by JENNIFER and left without being seen by CNM as she stated to office staff she had to leave to feed infant.

## 2025-05-18 ENCOUNTER — HOSPITAL ENCOUNTER (EMERGENCY)
Facility: HOSPITAL | Age: 30
Discharge: HOME | End: 2025-05-18
Payer: COMMERCIAL

## 2025-05-18 ENCOUNTER — APPOINTMENT (OUTPATIENT)
Dept: CARDIOLOGY | Facility: HOSPITAL | Age: 30
End: 2025-05-18
Payer: COMMERCIAL

## 2025-05-18 ENCOUNTER — APPOINTMENT (OUTPATIENT)
Dept: RADIOLOGY | Facility: HOSPITAL | Age: 30
End: 2025-05-18
Payer: COMMERCIAL

## 2025-05-18 VITALS
OXYGEN SATURATION: 98 % | TEMPERATURE: 98.6 F | RESPIRATION RATE: 18 BRPM | BODY MASS INDEX: 19.94 KG/M2 | SYSTOLIC BLOOD PRESSURE: 105 MMHG | DIASTOLIC BLOOD PRESSURE: 50 MMHG | HEART RATE: 75 BPM | WEIGHT: 95 LBS | HEIGHT: 58 IN

## 2025-05-18 DIAGNOSIS — R07.81 RIB PAIN: Primary | ICD-10-CM

## 2025-05-18 LAB
ALBUMIN SERPL BCP-MCNC: 4.7 G/DL (ref 3.4–5)
ALP SERPL-CCNC: 68 U/L (ref 33–110)
ALT SERPL W P-5'-P-CCNC: 30 U/L (ref 7–45)
ANION GAP SERPL CALC-SCNC: 12 MMOL/L (ref 10–20)
AST SERPL W P-5'-P-CCNC: 26 U/L (ref 9–39)
B-HCG SERPL-ACNC: <2 MIU/ML
BASOPHILS # BLD AUTO: 0.03 X10*3/UL (ref 0–0.1)
BASOPHILS NFR BLD AUTO: 0.4 %
BILIRUB SERPL-MCNC: 0.4 MG/DL (ref 0–1.2)
BUN SERPL-MCNC: 12 MG/DL (ref 6–23)
CALCIUM SERPL-MCNC: 9.5 MG/DL (ref 8.6–10.3)
CARDIAC TROPONIN I PNL SERPL HS: <3 NG/L (ref 0–13)
CHLORIDE SERPL-SCNC: 106 MMOL/L (ref 98–107)
CO2 SERPL-SCNC: 24 MMOL/L (ref 21–32)
CREAT SERPL-MCNC: 0.44 MG/DL (ref 0.5–1.05)
D DIMER PPP FEU-MCNC: 371 NG/ML FEU
EGFRCR SERPLBLD CKD-EPI 2021: >90 ML/MIN/1.73M*2
EOSINOPHIL # BLD AUTO: 0.09 X10*3/UL (ref 0–0.7)
EOSINOPHIL NFR BLD AUTO: 1.3 %
ERYTHROCYTE [DISTWIDTH] IN BLOOD BY AUTOMATED COUNT: 17.5 % (ref 11.5–14.5)
GLUCOSE SERPL-MCNC: 85 MG/DL (ref 74–99)
HCT VFR BLD AUTO: 37.4 % (ref 36–46)
HGB BLD-MCNC: 12 G/DL (ref 12–16)
IMM GRANULOCYTES # BLD AUTO: 0.01 X10*3/UL (ref 0–0.7)
IMM GRANULOCYTES NFR BLD AUTO: 0.1 % (ref 0–0.9)
LYMPHOCYTES # BLD AUTO: 2.46 X10*3/UL (ref 1.2–4.8)
LYMPHOCYTES NFR BLD AUTO: 35.4 %
MCH RBC QN AUTO: 28 PG (ref 26–34)
MCHC RBC AUTO-ENTMCNC: 32.1 G/DL (ref 32–36)
MCV RBC AUTO: 87 FL (ref 80–100)
MONOCYTES # BLD AUTO: 0.53 X10*3/UL (ref 0.1–1)
MONOCYTES NFR BLD AUTO: 7.6 %
NEUTROPHILS # BLD AUTO: 3.82 X10*3/UL (ref 1.2–7.7)
NEUTROPHILS NFR BLD AUTO: 55.2 %
NRBC BLD-RTO: 0 /100 WBCS (ref 0–0)
PLATELET # BLD AUTO: 278 X10*3/UL (ref 150–450)
POTASSIUM SERPL-SCNC: 3.5 MMOL/L (ref 3.5–5.3)
PROT SERPL-MCNC: 7.9 G/DL (ref 6.4–8.2)
RBC # BLD AUTO: 4.29 X10*6/UL (ref 4–5.2)
SODIUM SERPL-SCNC: 138 MMOL/L (ref 136–145)
WBC # BLD AUTO: 6.9 X10*3/UL (ref 4.4–11.3)

## 2025-05-18 PROCEDURE — 84484 ASSAY OF TROPONIN QUANT: CPT

## 2025-05-18 PROCEDURE — 85379 FIBRIN DEGRADATION QUANT: CPT

## 2025-05-18 PROCEDURE — 84702 CHORIONIC GONADOTROPIN TEST: CPT

## 2025-05-18 PROCEDURE — 71045 X-RAY EXAM CHEST 1 VIEW: CPT

## 2025-05-18 PROCEDURE — 71045 X-RAY EXAM CHEST 1 VIEW: CPT | Performed by: RADIOLOGY

## 2025-05-18 PROCEDURE — 85025 COMPLETE CBC W/AUTO DIFF WBC: CPT

## 2025-05-18 PROCEDURE — 99285 EMERGENCY DEPT VISIT HI MDM: CPT

## 2025-05-18 PROCEDURE — 36415 COLL VENOUS BLD VENIPUNCTURE: CPT

## 2025-05-18 PROCEDURE — 93005 ELECTROCARDIOGRAM TRACING: CPT

## 2025-05-18 PROCEDURE — 80053 COMPREHEN METABOLIC PANEL: CPT

## 2025-05-18 RX ORDER — PREDNISONE 10 MG/1
20 TABLET ORAL 2 TIMES DAILY
Qty: 20 TABLET | Refills: 0 | Status: SHIPPED | OUTPATIENT
Start: 2025-05-18 | End: 2025-05-23

## 2025-05-18 RX ORDER — NAPROXEN SODIUM 550 MG/1
550 TABLET ORAL
Qty: 14 TABLET | Refills: 0 | Status: SHIPPED | OUTPATIENT
Start: 2025-05-18 | End: 2025-05-25

## 2025-05-18 RX ORDER — LIDOCAINE 560 MG/1
1 PATCH PERCUTANEOUS; TOPICAL; TRANSDERMAL DAILY
Qty: 5 PATCH | Refills: 0 | Status: SHIPPED | OUTPATIENT
Start: 2025-05-18 | End: 2025-05-23

## 2025-05-18 ASSESSMENT — COLUMBIA-SUICIDE SEVERITY RATING SCALE - C-SSRS
6. HAVE YOU EVER DONE ANYTHING, STARTED TO DO ANYTHING, OR PREPARED TO DO ANYTHING TO END YOUR LIFE?: NO
2. HAVE YOU ACTUALLY HAD ANY THOUGHTS OF KILLING YOURSELF?: NO
1. IN THE PAST MONTH, HAVE YOU WISHED YOU WERE DEAD OR WISHED YOU COULD GO TO SLEEP AND NOT WAKE UP?: NO
6. HAVE YOU EVER DONE ANYTHING, STARTED TO DO ANYTHING, OR PREPARED TO DO ANYTHING TO END YOUR LIFE?: YES

## 2025-05-18 ASSESSMENT — LIFESTYLE VARIABLES
TOTAL SCORE: 0
HAVE PEOPLE ANNOYED YOU BY CRITICIZING YOUR DRINKING: NO
EVER FELT BAD OR GUILTY ABOUT YOUR DRINKING: NO
HAVE YOU EVER FELT YOU SHOULD CUT DOWN ON YOUR DRINKING: NO
EVER HAD A DRINK FIRST THING IN THE MORNING TO STEADY YOUR NERVES TO GET RID OF A HANGOVER: NO

## 2025-05-18 ASSESSMENT — PAIN DESCRIPTION - LOCATION: LOCATION: RIB CAGE

## 2025-05-18 ASSESSMENT — PAIN DESCRIPTION - ORIENTATION: ORIENTATION: LEFT

## 2025-05-18 ASSESSMENT — PAIN - FUNCTIONAL ASSESSMENT: PAIN_FUNCTIONAL_ASSESSMENT: 0-10

## 2025-05-18 ASSESSMENT — PAIN SCALES - GENERAL: PAINLEVEL_OUTOF10: 6

## 2025-05-18 NOTE — ED PROVIDER NOTES
HPI   No chief complaint on file.      History provided by: Patient    Limitations to history: None    CC: Rib injury    HPI: 29-year-old female previously healthy presents the emergency department to be evaluated for rib injury.  Patient states that earlier this month she had a fall when she felt lightheaded on the stairs.  Patient did not report any injury from when this occurred.  She was seen in the emergency department and medically cleared to be discharged.  Patient states that over the last few days has been developing pain in her left ribs.  The pain is worse with movement, palpation, breathing.  Denies shortness of breath.  Denies hemoptysis.  Denies cough.  Denies history of heart or lung disease including ACS, arrhythmias, heart failure, DVT or PE, asthma or COPD.  Denies recent plane flights, recent surgeries, history of cancer, use of estrogen.  She denies taking anything for pain other than Tylenol.  Denies pain or injury elsewhere.  Denies abdominal pain or flank pain.  Denies blood in urine or stool.  Denies any urinary complaints.  Denies headache or neck pain.  Denies vision changes.  Denies lightheadedness and dizziness.  Denies numbness tingling or weakness in extremities.    ROS: Negative unless mentioned in HPI    Medical Hx: Allergies reviewed.  Immunizations up-to-date.    Physical exam:    Constitutional: Sitting comfortably in the room and in no distress.  Oriented to person, place, time, and situation.    HEENT: Head is normocephalic, atraumatic. Patient's airway is patent.  Tympanic membranes are clear bilaterally.  Nasal mucosa clear.  Mouth with normal mucosa.  Throat is not erythematous and there are no oropharyngeal exudates, uvula is midline.  No obvious facial deformities.    Eyes: Clear bilaterally.  Pupils are equal round and reactive to light and accommodation.  Extraocular movements intact.      Cardiac: Regular rate, regular rhythm.  Heart sounds S1, S2.  No murmurs, rubs, or  gallops.  PMI nondisplaced.  No JVD.    Respiratory: Regular respiratory rate and effort.  Breath sounds are clear and equal bilaterally, no adventitious lung sounds.  Patient is speaking in full sentences and is in no apparent respiratory distress. No use of accessory muscles.      Gastrointestinal: Abdomen is soft, nondistended, and nontender.  There are no obvious deformities.  No rebound tenderness or guarding.  Bowel sounds are normal active.    Genitourinary: No CVA or flank tenderness.    Musculoskeletal: Reproducible tenderness of the left anterior and inferior ribs no obvious skin or bony deformities.  Patient has equal range of motion in all extremities and no strength deficiencies.   No back or neck tenderness.  Capillary refill less than 3 seconds.  Strong peripheral pulses.  No sensory deficits.    Neurological: Patient is alert and oriented.  No focal deficits.  5/5 strength in all extremities.  Cranial nerves II through XII intact. GCS15.     Skin: Skin is normal color for race and is warm, dry, and intact.  No evidence of trauma.  No lesions, rashes, bruising, jaundice, or masses.    Psych: Appropriate mood and affect.  No apparent risk to self or others.    Heme/lymph: No adenopathy, lymphadenopathy, or splenomegaly    Physical exam is otherwise negative unless stated above or in history of present illness.              Patient History   Medical History[1]  Surgical History[2]  Family History[3]  Social History[4]    Physical Exam   ED Triage Vitals   Temp Pulse Resp BP   -- -- -- --      SpO2 Temp src Heart Rate Source Patient Position   -- -- -- --      BP Location FiO2 (%)     -- --       Physical Exam      ED Course & MDM   Diagnoses as of 05/18/25 1901   Rib pain   Patient updated on plan for lab testing, IV insertion, radiology imaging, and medications to be administered while in the ER (if indicated). Patient updated on expected wait times for testing and results. Patient provided my name and  told to ask any staff member for questions or concerns if they should arise. Electronic medical record reviewed.     MDM    Patient presented to the emergency department with the chief complaint left rib pain.  Reproducible tenderness of the left anterior and inferior ribs no obvious skin or bony deformities.  Patient has equal range of motion in all extremities and no strength deficiencies.   No back or neck tenderness.  Capillary refill less than 3 seconds.  Strong peripheral pulses.  No sensory deficits.examination the patient's heart and lungs are unremarkable.  On arrival to the emergency department, vital signs were within normal limits    I believe the patient's pain is likely musculoskeletal however given that the patient's pain is occurring much later after her original injury we will obtain a cardiac workup including basic blood work, EKG and troponin, chest x-ray, D-dimer.    EKG was performed at 1729 interpreted by me.  Normal sinus rhythm 74 beats minute.  Normal axis.  No ST elevation or depression.  No prolonged QT.    Pregnancy test is negative.  Troponin is less than 3.  D-dimer is negative making a PE unlikely.  CMP is unremarkable.  CBC reveals no leukocytosis or anemia.  Chest x-ray reveals no pneumonia, pneumothorax, or rib fracture.  I believe the patient likely either experienced a rib contusion or costochondritis.  She feels well and feels comfortable going home.  Will start the patient on Anaprox, 5 days of prednisone, and lidocaine patches.  I did discuss taking big breaths periodically to prevent pneumonia.  All questions and concerns addressed.  Reasons to return to ER discussed.  Patient verbalized understanding and agreement with the treatment plan and they remained hemodynamically stable in the ER.    This note was dictated using a speech recognition program.  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    Complete spontaneous     Depression     Encounter for examination of ears and hearing without abnormal findings 2016    Encounter for hearing evaluation    Female infertility     Otitis media, unspecified, right ear 10/20/2020    Otitis media, right    Pain in unspecified ankle and joints of unspecified foot 2021    Ankle pain    Personal history of other (healed) physical injury and trauma 2021    History of sprain of ankle    Personal history of other diseases of the digestive system 2017    History of oral lesions    Personal history of other diseases of the digestive system 2020    History of dental abscess    Personal history of other diseases of the respiratory system 2020    History of pharyngitis    Possible exposure to STD 02/15/2024   [2]   Past Surgical History:  Procedure Laterality Date    CT ABDOMEN PELVIS ANGIOGRAM W AND/OR WO IV CONTRAST  2019    CT ABDOMEN PELVIS ANGIOGRAM W AND/OR WO IV CONTRAST 2019 [facility]    TONSILLECTOMY  2014    Tonsillectomy   [3] No family history on file.  [4]   Social History  Tobacco Use    Smoking status: Never    Smokeless tobacco: Never   Vaping Use    Vaping status: Never Used   Substance Use Topics    Alcohol use: Not Currently    Drug use: Never        Angel Keller PA-C  [id number]

## 2025-05-19 LAB
ATRIAL RATE: 74 BPM
P AXIS: 48 DEGREES
P OFFSET: 198 MS
P ONSET: 161 MS
PR INTERVAL: 120 MS
Q ONSET: 221 MS
QRS COUNT: 12 BEATS
QRS DURATION: 78 MS
QT INTERVAL: 376 MS
QTC CALCULATION(BAZETT): 417 MS
QTC FREDERICIA: 403 MS
R AXIS: 80 DEGREES
T AXIS: 58 DEGREES
T OFFSET: 409 MS
VENTRICULAR RATE: 74 BPM

## 2025-10-21 ENCOUNTER — APPOINTMENT (OUTPATIENT)
Dept: OBSTETRICS AND GYNECOLOGY | Facility: CLINIC | Age: 30
End: 2025-10-21
Payer: COMMERCIAL

## 2025-11-10 ENCOUNTER — APPOINTMENT (OUTPATIENT)
Dept: PRIMARY CARE | Facility: CLINIC | Age: 30
End: 2025-11-10
Payer: COMMERCIAL